# Patient Record
Sex: FEMALE | Race: BLACK OR AFRICAN AMERICAN | Employment: UNEMPLOYED | URBAN - METROPOLITAN AREA
[De-identification: names, ages, dates, MRNs, and addresses within clinical notes are randomized per-mention and may not be internally consistent; named-entity substitution may affect disease eponyms.]

---

## 2000-01-01 LAB — COLOGUARD TEST, EXTERNAL: NORMAL

## 2017-01-13 ENCOUNTER — TELEPHONE (OUTPATIENT)
Dept: NEUROLOGY | Age: 62
End: 2017-01-13

## 2017-01-13 NOTE — TELEPHONE ENCOUNTER
Spoke with Jackeline Ovalle, with [employer], patient will need [employer] Prior Auth Competed Faxed to office, and sent to [employer] for Avonex.

## 2017-01-24 ENCOUNTER — TELEPHONE (OUTPATIENT)
Dept: NEUROLOGY | Age: 62
End: 2017-01-24

## 2017-01-26 NOTE — TELEPHONE ENCOUNTER
Spoke with Eliana Salas, Pharmacist [facility], order for Avonex confirmed prefilled syringes.

## 2017-01-31 ENCOUNTER — TELEPHONE (OUTPATIENT)
Dept: NEUROLOGY | Age: 62
End: 2017-01-31

## 2017-02-01 NOTE — TELEPHONE ENCOUNTER
Spoke with Catalina Bañuelos, with [employer], received Denial for Avonex. Appeals for patient [phone]. PA # [phone]. [facility] [phone]. MD to resubmit for PA.

## 2017-02-02 ENCOUNTER — TELEPHONE (OUTPATIENT)
Dept: NEUROLOGY | Age: 62
End: 2017-02-02

## 2017-02-08 ENCOUNTER — TELEPHONE (OUTPATIENT)
Dept: NEUROLOGY | Age: 62
End: 2017-02-08

## 2017-02-08 NOTE — TELEPHONE ENCOUNTER
Home Health calling because they received a prescription for PT and they want a med list, history, and last office note Fax: [phone]

## 2017-02-10 RX ORDER — TIZANIDINE 4 MG/1
2 TABLET ORAL
Qty: 60 TAB | Refills: 0 | Status: SHIPPED | OUTPATIENT
Start: 2017-02-10 | End: 2017-05-05 | Stop reason: SDUPTHER

## 2017-02-10 NOTE — TELEPHONE ENCOUNTER
Requested Prescriptions     Pending Prescriptions Disp Refills    tiZANidine (ZANAFLEX) 4 mg tablet 60 Tab 0     Sig: Take 1 Tab by mouth every eight (8) hours as needed.

## 2017-02-13 ENCOUNTER — TELEPHONE (OUTPATIENT)
Dept: NEUROLOGY | Age: 62
End: 2017-02-13

## 2017-02-14 ENCOUNTER — TELEPHONE (OUTPATIENT)
Dept: NEUROLOGY | Age: 62
End: 2017-02-14

## 2017-02-14 NOTE — TELEPHONE ENCOUNTER
Would like a verbal order to see pt 2x per week for 6 weeks pending insurance approval.   Also wanted to note that pt is taking an extra benadryl that is not on medication list given by Dr. Abbi Courtney. Would also like pt to be fitted for a power wheelchair if Dr. Abbi Courtney approves.

## 2017-02-16 NOTE — TELEPHONE ENCOUNTER
Nikky Mott from [address] [place] [address] calling back because she hasn't received a phone call from the message she left on Tuesday. Please give her a call back as soon as possible.

## 2017-02-17 ENCOUNTER — TELEPHONE (OUTPATIENT)
Dept: NEUROLOGY | Age: 62
End: 2017-02-17

## 2017-02-17 DIAGNOSIS — G35 MS (MULTIPLE SCLEROSIS) (HCC): Primary | ICD-10-CM

## 2017-02-17 NOTE — TELEPHONE ENCOUNTER
Arelis Gardner with [employer] calling because she needs an order sent for eval and fitting for power wheelchair.  Please fax: [phone]

## 2017-02-21 ENCOUNTER — TELEPHONE (OUTPATIENT)
Dept: NEUROLOGY | Age: 62
End: 2017-02-21

## 2017-02-21 NOTE — TELEPHONE ENCOUNTER
Pharmacy calling in re to [facility]. The pharmacy would like to know if the pt is going to start taking the medication or if Dr. Adriano Gagnon decided to choose a different medication.  Please give them a call back

## 2017-02-24 ENCOUNTER — APPOINTMENT (OUTPATIENT)
Dept: GENERAL RADIOLOGY | Age: 62
DRG: 060 | End: 2017-02-24
Attending: EMERGENCY MEDICINE
Payer: MEDICARE

## 2017-02-24 ENCOUNTER — HOSPITAL ENCOUNTER (INPATIENT)
Age: 62
LOS: 7 days | Discharge: NURSING, REHAB OR LONG-TERM CARE | DRG: 060 | End: 2017-03-03
Attending: EMERGENCY MEDICINE | Admitting: INTERNAL MEDICINE
Payer: MEDICARE

## 2017-02-24 ENCOUNTER — APPOINTMENT (OUTPATIENT)
Dept: CT IMAGING | Age: 62
DRG: 060 | End: 2017-02-24
Attending: EMERGENCY MEDICINE
Payer: MEDICARE

## 2017-02-24 ENCOUNTER — TELEPHONE (OUTPATIENT)
Dept: NEUROLOGY | Age: 62
End: 2017-02-24
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
## 2017-02-24 DIAGNOSIS — G35 MULTIPLE SCLEROSIS EXACERBATION (HCC): Primary | ICD-10-CM

## 2017-02-24 LAB
ALBUMIN SERPL BCP-MCNC: 3.9 G/DL (ref 3.5–5)
ALBUMIN/GLOB SERPL: 0.9 {RATIO} (ref 1.1–2.2)
ALP SERPL-CCNC: 62 U/L (ref 45–117)
ALT SERPL-CCNC: 22 U/L (ref 12–78)
ANION GAP BLD CALC-SCNC: 9 MMOL/L (ref 5–15)
APPEARANCE UR: CLEAR
APTT PPP: 33.2 SEC (ref 22.1–32.5)
AST SERPL W P-5'-P-CCNC: 17 U/L (ref 15–37)
BACTERIA URNS QL MICRO: NEGATIVE /HPF
BASOPHILS # BLD AUTO: 0 K/UL (ref 0–0.1)
BASOPHILS # BLD: 0 % (ref 0–1)
BILIRUB SERPL-MCNC: 0.3 MG/DL (ref 0.2–1)
BILIRUB UR QL: NEGATIVE
BNP SERPL-MCNC: 81 PG/ML (ref 0–100)
BUN SERPL-MCNC: 9 MG/DL (ref 6–20)
BUN/CREAT SERPL: 17 (ref 12–20)
CALCIUM SERPL-MCNC: 8.9 MG/DL (ref 8.5–10.1)
CHLORIDE SERPL-SCNC: 99 MMOL/L (ref 97–108)
CK MB CFR SERPL CALC: 0.6 % (ref 0–2.5)
CK MB SERPL-MCNC: 1.2 NG/ML (ref 5–25)
CK SERPL-CCNC: 199 U/L (ref 26–192)
CO2 SERPL-SCNC: 29 MMOL/L (ref 21–32)
COLOR UR: ABNORMAL
CREAT SERPL-MCNC: 0.54 MG/DL (ref 0.55–1.02)
DIFFERENTIAL METHOD BLD: ABNORMAL
EOSINOPHIL # BLD: 0 K/UL (ref 0–0.4)
EOSINOPHIL NFR BLD: 0 % (ref 0–7)
EPITH CASTS URNS QL MICRO: ABNORMAL /LPF
ERYTHROCYTE [DISTWIDTH] IN BLOOD BY AUTOMATED COUNT: 13.7 % (ref 11.5–14.5)
FLUAV AG NPH QL IA: NEGATIVE
FLUBV AG NOSE QL IA: NEGATIVE
GLOBULIN SER CALC-MCNC: 4.4 G/DL (ref 2–4)
GLUCOSE SERPL-MCNC: 92 MG/DL (ref 65–100)
GLUCOSE UR STRIP.AUTO-MCNC: NEGATIVE MG/DL
HCT VFR BLD AUTO: 39.4 % (ref 35–47)
HGB BLD-MCNC: 12.8 G/DL (ref 11.5–16)
HGB UR QL STRIP: ABNORMAL
HYALINE CASTS URNS QL MICRO: ABNORMAL /LPF (ref 0–5)
INR PPP: 1.1 (ref 0.9–1.1)
KETONES UR QL STRIP.AUTO: NEGATIVE MG/DL
LACTATE SERPL-SCNC: 1.3 MMOL/L (ref 0.4–2)
LEUKOCYTE ESTERASE UR QL STRIP.AUTO: NEGATIVE
LYMPHOCYTES # BLD AUTO: 7 % (ref 12–49)
LYMPHOCYTES # BLD: 0.4 K/UL (ref 0.8–3.5)
MCH RBC QN AUTO: 27.1 PG (ref 26–34)
MCHC RBC AUTO-ENTMCNC: 32.5 G/DL (ref 30–36.5)
MCV RBC AUTO: 83.3 FL (ref 80–99)
MONOCYTES # BLD: 0.4 K/UL (ref 0–1)
MONOCYTES NFR BLD AUTO: 7 % (ref 5–13)
NEUTS SEG # BLD: 4.4 K/UL (ref 1.8–8)
NEUTS SEG NFR BLD AUTO: 86 % (ref 32–75)
NITRITE UR QL STRIP.AUTO: NEGATIVE
PH UR STRIP: 6 [PH] (ref 5–8)
PLATELET # BLD AUTO: 271 K/UL (ref 150–400)
POTASSIUM SERPL-SCNC: 3.4 MMOL/L (ref 3.5–5.1)
PROT SERPL-MCNC: 8.3 G/DL (ref 6.4–8.2)
PROT UR STRIP-MCNC: NEGATIVE MG/DL
PROTHROMBIN TIME: 11 SEC (ref 9–11.1)
RBC # BLD AUTO: 4.73 M/UL (ref 3.8–5.2)
RBC #/AREA URNS HPF: ABNORMAL /HPF (ref 0–5)
RBC MORPH BLD: ABNORMAL
SODIUM SERPL-SCNC: 137 MMOL/L (ref 136–145)
SP GR UR REFRACTOMETRY: 1.02 (ref 1–1.03)
THERAPEUTIC RANGE,PTTT: ABNORMAL SECS (ref 58–77)
TROPONIN I SERPL-MCNC: <0.04 NG/ML
UA: UC IF INDICATED,UAUC: ABNORMAL
UROBILINOGEN UR QL STRIP.AUTO: 0.2 EU/DL (ref 0.2–1)
WBC # BLD AUTO: 5.2 K/UL (ref 3.6–11)
WBC URNS QL MICRO: ABNORMAL /HPF (ref 0–4)

## 2017-02-24 PROCEDURE — 74011250636 HC RX REV CODE- 250/636: Performed by: FAMILY MEDICINE

## 2017-02-24 PROCEDURE — 65270000032 HC RM SEMIPRIVATE

## 2017-02-24 PROCEDURE — 93005 ELECTROCARDIOGRAM TRACING: CPT

## 2017-02-24 PROCEDURE — 80053 COMPREHEN METABOLIC PANEL: CPT | Performed by: EMERGENCY MEDICINE

## 2017-02-24 PROCEDURE — 82550 ASSAY OF CK (CPK): CPT | Performed by: EMERGENCY MEDICINE

## 2017-02-24 PROCEDURE — 74011250637 HC RX REV CODE- 250/637: Performed by: FAMILY MEDICINE

## 2017-02-24 PROCEDURE — 99285 EMERGENCY DEPT VISIT HI MDM: CPT

## 2017-02-24 PROCEDURE — 83605 ASSAY OF LACTIC ACID: CPT | Performed by: EMERGENCY MEDICINE

## 2017-02-24 PROCEDURE — 83880 ASSAY OF NATRIURETIC PEPTIDE: CPT | Performed by: EMERGENCY MEDICINE

## 2017-02-24 PROCEDURE — 96360 HYDRATION IV INFUSION INIT: CPT

## 2017-02-24 PROCEDURE — 74011250636 HC RX REV CODE- 250/636: Performed by: INTERNAL MEDICINE

## 2017-02-24 PROCEDURE — 87040 BLOOD CULTURE FOR BACTERIA: CPT | Performed by: EMERGENCY MEDICINE

## 2017-02-24 PROCEDURE — 81001 URINALYSIS AUTO W/SCOPE: CPT | Performed by: EMERGENCY MEDICINE

## 2017-02-24 PROCEDURE — 36415 COLL VENOUS BLD VENIPUNCTURE: CPT | Performed by: EMERGENCY MEDICINE

## 2017-02-24 PROCEDURE — 70450 CT HEAD/BRAIN W/O DYE: CPT

## 2017-02-24 PROCEDURE — 71010 XR CHEST PORT: CPT

## 2017-02-24 PROCEDURE — 82553 CREATINE MB FRACTION: CPT | Performed by: EMERGENCY MEDICINE

## 2017-02-24 PROCEDURE — 84484 ASSAY OF TROPONIN QUANT: CPT | Performed by: EMERGENCY MEDICINE

## 2017-02-24 PROCEDURE — 74011250636 HC RX REV CODE- 250/636: Performed by: EMERGENCY MEDICINE

## 2017-02-24 PROCEDURE — 85610 PROTHROMBIN TIME: CPT | Performed by: EMERGENCY MEDICINE

## 2017-02-24 PROCEDURE — 74011250637 HC RX REV CODE- 250/637: Performed by: INTERNAL MEDICINE

## 2017-02-24 PROCEDURE — 87804 INFLUENZA ASSAY W/OPTIC: CPT | Performed by: EMERGENCY MEDICINE

## 2017-02-24 PROCEDURE — 85730 THROMBOPLASTIN TIME PARTIAL: CPT | Performed by: EMERGENCY MEDICINE

## 2017-02-24 PROCEDURE — 85025 COMPLETE CBC W/AUTO DIFF WBC: CPT | Performed by: EMERGENCY MEDICINE

## 2017-02-24 RX ORDER — TIZANIDINE 4 MG/1
4 TABLET ORAL
Status: DISCONTINUED | OUTPATIENT
Start: 2017-02-24 | End: 2017-03-03 | Stop reason: HOSPADM

## 2017-02-24 RX ORDER — DIPHENHYDRAMINE HYDROCHLORIDE 50 MG/ML
12.5 INJECTION, SOLUTION INTRAMUSCULAR; INTRAVENOUS
Status: DISCONTINUED | OUTPATIENT
Start: 2017-02-24 | End: 2017-03-03 | Stop reason: HOSPADM

## 2017-02-24 RX ORDER — ACETAMINOPHEN 325 MG/1
650 TABLET ORAL
Status: DISCONTINUED | OUTPATIENT
Start: 2017-02-24 | End: 2017-03-03 | Stop reason: HOSPADM

## 2017-02-24 RX ORDER — DIPHENHYDRAMINE HYDROCHLORIDE 50 MG/ML
12.5 INJECTION, SOLUTION INTRAMUSCULAR; INTRAVENOUS ONCE
Status: COMPLETED | OUTPATIENT
Start: 2017-02-24 | End: 2017-02-24

## 2017-02-24 RX ORDER — HEPARIN SODIUM 5000 [USP'U]/ML
5000 INJECTION, SOLUTION INTRAVENOUS; SUBCUTANEOUS EVERY 8 HOURS
Status: DISCONTINUED | OUTPATIENT
Start: 2017-02-24 | End: 2017-03-03 | Stop reason: HOSPADM

## 2017-02-24 RX ORDER — SERTRALINE HYDROCHLORIDE 50 MG/1
50 TABLET, FILM COATED ORAL DAILY
Status: DISCONTINUED | OUTPATIENT
Start: 2017-02-25 | End: 2017-02-24

## 2017-02-24 RX ORDER — SODIUM CHLORIDE 0.9 % (FLUSH) 0.9 %
5-10 SYRINGE (ML) INJECTION AS NEEDED
Status: DISCONTINUED | OUTPATIENT
Start: 2017-02-24 | End: 2017-03-03 | Stop reason: HOSPADM

## 2017-02-24 RX ORDER — SODIUM CHLORIDE 0.9 % (FLUSH) 0.9 %
5-10 SYRINGE (ML) INJECTION EVERY 8 HOURS
Status: DISCONTINUED | OUTPATIENT
Start: 2017-02-24 | End: 2017-03-03 | Stop reason: HOSPADM

## 2017-02-24 RX ORDER — SERTRALINE HYDROCHLORIDE 50 MG/1
50 TABLET, FILM COATED ORAL DAILY
COMMUNITY
End: 2017-05-05 | Stop reason: SDUPTHER

## 2017-02-24 RX ORDER — SERTRALINE HYDROCHLORIDE 50 MG/1
50 TABLET, FILM COATED ORAL DAILY
Status: DISCONTINUED | OUTPATIENT
Start: 2017-02-24 | End: 2017-03-03 | Stop reason: HOSPADM

## 2017-02-24 RX ADMIN — TIZANIDINE 4 MG: 4 TABLET ORAL at 21:21

## 2017-02-24 RX ADMIN — HEPARIN SODIUM 5000 UNITS: 5000 INJECTION, SOLUTION INTRAVENOUS; SUBCUTANEOUS at 21:22

## 2017-02-24 RX ADMIN — ACETAMINOPHEN 650 MG: 325 TABLET, FILM COATED ORAL at 17:47

## 2017-02-24 RX ADMIN — DIPHENHYDRAMINE HYDROCHLORIDE 12.5 MG: 50 INJECTION, SOLUTION INTRAMUSCULAR; INTRAVENOUS at 21:22

## 2017-02-24 RX ADMIN — SODIUM CHLORIDE 1000 ML: 900 INJECTION, SOLUTION INTRAVENOUS at 17:44

## 2017-02-24 RX ADMIN — SODIUM CHLORIDE 1000 ML: 900 INJECTION, SOLUTION INTRAVENOUS at 14:25

## 2017-02-24 RX ADMIN — Medication 10 ML: at 21:23

## 2017-02-24 RX ADMIN — SERTRALINE HYDROCHLORIDE 50 MG: 50 TABLET ORAL at 21:21

## 2017-02-24 NOTE — H&P
Hospitalist Progress Note          Jasmyn Salter M.D. [place]: [phone]  Call physician on-call through the  7pm-7am        Date of visit:  2/24/2017    Primary Care Provider: Marisol Price MD  Source of Information: Patient and chart review    History of Presenting Illness:   Sheryl Wagner is a 59 y.o. female with PMHx of MS and MVP, who presents to the ER with chief complaint of weakness that resulted in a fall earlier today. Patient states that her Right leg is \"paralyzed\" at baseline and she normally ambulates with a cane. Since yesterday, she had a fairly rapid onset of profound weakness in her Left leg as well, which made her ambulation difficult. Last night, she got up to use the bathroom, she felt dizzy and weak in both legs. She fell to the floor and was not able to get up. She lay on the floor most of the night. Her family got concerned when she did not answer the phone, and called the police. Patient denies any LOC, she denies hitting her head or injuring herself. She complains of left lateral arm pain that has been present for about two weeks. She states that she felt nauseous this morning and she had multiple episodes of urination last night. \"I just kept on going and going\". She denies any burning. No reported fevers, chills, chest pain, cough, congestion, recent illness, palpitations, or dysuria. Review of Systems:  Pertinent items are noted in the History of Present Illness. Past Medical History:   Diagnosis Date    Neurological disorder     Multiple sclerosis, in remission 2011    Other ill-defined conditions(799.89)     Mitral Value Prolapse      Past Surgical History:   Procedure Laterality Date    ABDOMEN SURGERY PROC UNLISTED      hernia repair    HX HYSTERECTOMY       Prior to Admission medications    Medication Sig Start Date End Date Taking?  Authorizing Provider   sertraline (ZOLOFT) 50 mg tablet Take 50 mg by mouth daily. Yes Historical Provider   tiZANidine (ZANAFLEX) 4 mg tablet Take 1 Tab by mouth every eight (8) hours as needed. 2/10/17  Yes Anastacia Serna DO     No Known Allergies   History reviewed. No pertinent family history. SOCIAL HISTORY:  Patient resides:  Independently x   Assisted Living    SNF    With family care       Smoking history:   None x   Former    Chronic      Alcohol history:   None x   Social    Chronic      Ambulates:   Independently    w/cane x   w/walker    w/wc    CODE STATUS:  DNR    Full x   Other      Objective:     Physical Exam:     Visit Vitals    /66    Pulse 88    Temp (!) 101.3 °F (38.5 °C)    Resp 19    Ht 5' 3\" (1.6 m)    Wt 72.6 kg (160 lb)    SpO2 93%    BMI 28.34 kg/m2      O2 Device: Room air    General:  Alert, cooperative, no distress, appears stated age. Head:  Normocephalic, without obvious abnormality, atraumatic. Eyes:  Conjunctivae/corneas clear. EOMs intact. Patient wears glasses   Neck: Supple, symmetrical, trachea midline;       Lungs:   Clear to auscultation bilaterally. Heart:  Regular rate and rhythm, S1, S2 normal, no murmur, click, rub or gallop. Abdomen:   Soft, non-tender. Bowel sounds normal.    Extremities: Extremities normal, atraumatic, no cyanosis or edema. Skin: Skin color, texture, turgor normal. No rashes or lesions   Neurologic: Speech intact, LLE strength 1/5; RLE strength 3/5;   Both upper extremities, strength is 4/5, other than distal left hand with contractures of small muscles of the hand.       Data Review:     Recent Days:  Recent Labs      02/24/17   1415   WBC  5.2   HGB  12.8   HCT  39.4   PLT  271     Recent Labs      02/24/17   1415  02/24/17   1414   NA  137   --    K  3.4*   --    CL  99   --    CO2  29   --    GLU  92   --    BUN  9   --    CREA  0.54*   --    CA  8.9   --    ALB  3.9   --    SGOT  17   --    ALT  22   --    INR   --   1.1     No results for input(s): PH, PCO2, PO2, HCO3, FIO2 in the last 72 hours. 24 Hour Results:  Recent Results (from the past 24 hour(s))   EKG, 12 LEAD, INITIAL    Collection Time: 02/24/17  2:11 PM   Result Value Ref Range    Ventricular Rate 82 BPM    Atrial Rate 82 BPM    P-R Interval 140 ms    QRS Duration 62 ms    Q-T Interval 374 ms    QTC Calculation (Bezet) 436 ms    Calculated P Axis 42 degrees    Calculated R Axis 50 degrees    Calculated T Axis 43 degrees    Diagnosis       Normal sinus rhythm  When compared with ECG of 25-JUL-2011 10:26,  No significant change was found     LACTIC ACID, PLASMA    Collection Time: 02/24/17  2:14 PM   Result Value Ref Range    Lactic acid 1.3 0.4 - 2.0 MMOL/L   PTT    Collection Time: 02/24/17  2:14 PM   Result Value Ref Range    aPTT 33.2 (H) 22.1 - 32.5 sec    aPTT, therapeutic range     58.0 - 77.0 SECS   PROTHROMBIN TIME + INR    Collection Time: 02/24/17  2:14 PM   Result Value Ref Range    INR 1.1 0.9 - 1.1      Prothrombin time 11.0 9.0 - 11.1 sec   BNP    Collection Time: 02/24/17  2:14 PM   Result Value Ref Range    BNP 81 0 - 100 pg/mL   CBC WITH AUTOMATED DIFF    Collection Time: 02/24/17  2:15 PM   Result Value Ref Range    WBC 5.2 3.6 - 11.0 K/uL    RBC 4.73 3.80 - 5.20 M/uL    HGB 12.8 11.5 - 16.0 g/dL    HCT 39.4 35.0 - 47.0 %    MCV 83.3 80.0 - 99.0 FL    MCH 27.1 26.0 - 34.0 PG    MCHC 32.5 30.0 - 36.5 g/dL    RDW 13.7 11.5 - 14.5 %    PLATELET 484 767 - 901 K/uL    NEUTROPHILS 86 (H) 32 - 75 %    LYMPHOCYTES 7 (L) 12 - 49 %    MONOCYTES 7 5 - 13 %    EOSINOPHILS 0 0 - 7 %    BASOPHILS 0 0 - 1 %    ABS. NEUTROPHILS 4.4 1.8 - 8.0 K/UL    ABS. LYMPHOCYTES 0.4 (L) 0.8 - 3.5 K/UL    ABS. MONOCYTES 0.4 0.0 - 1.0 K/UL    ABS. EOSINOPHILS 0.0 0.0 - 0.4 K/UL    ABS.  BASOPHILS 0.0 0.0 - 0.1 K/UL    DF SMEAR SCANNED      RBC COMMENTS ANISOCYTOSIS  1+       METABOLIC PANEL, COMPREHENSIVE    Collection Time: 02/24/17  2:15 PM   Result Value Ref Range    Sodium 137 136 - 145 mmol/L    Potassium 3.4 (L) 3.5 - 5.1 mmol/L    Chloride 99 97 - 108 mmol/L    CO2 29 21 - 32 mmol/L    Anion gap 9 5 - 15 mmol/L    Glucose 92 65 - 100 mg/dL    BUN 9 6 - 20 MG/DL    Creatinine 0.54 (L) 0.55 - 1.02 MG/DL    BUN/Creatinine ratio 17 12 - 20      GFR est AA >60 >60 ml/min/1.73m2    GFR est non-AA >60 >60 ml/min/1.73m2    Calcium 8.9 8.5 - 10.1 MG/DL    Bilirubin, total 0.3 0.2 - 1.0 MG/DL    ALT (SGPT) 22 12 - 78 U/L    AST (SGOT) 17 15 - 37 U/L    Alk. phosphatase 62 45 - 117 U/L    Protein, total 8.3 (H) 6.4 - 8.2 g/dL    Albumin 3.9 3.5 - 5.0 g/dL    Globulin 4.4 (H) 2.0 - 4.0 g/dL    A-G Ratio 0.9 (L) 1.1 - 2.2     TROPONIN I    Collection Time: 02/24/17  2:15 PM   Result Value Ref Range    Troponin-I, Qt. <0.04 <0.05 ng/mL   CK W/ REFLX CKMB    Collection Time: 02/24/17  2:15 PM   Result Value Ref Range     (H) 26 - 192 U/L   CK-MB,QUANT. Collection Time: 02/24/17  2:15 PM   Result Value Ref Range    CK - MB 1.2 <3.6 NG/ML    CK-MB Index 0.6 0 - 2.5           Imaging:   CT Head:   White matter disease. No hemorrhage or acute intracranial  Abnormality. CXR:   Normal chest.      Assessment and Plan:     Multiple sclerosis with possible exacerbation, resulting in a fall:  - admit to medical floor;  - Neurology consult;  - PT/OT evaluation;  - assess for infection as cause of worsening weakness: UA pending; blood culture collected and pending;  - empiric Zosyn, then streamline based on culture results. Mitral Valve Prolapse.      DVT prophylaxis: Heparin SC             Signed By: Amrik Orozco MD     February 24, 2017

## 2017-02-24 NOTE — IP AVS SNAPSHOT
[address] 
[phone] Patient: Alexandra Holder MRN: [id number] FRC:6/11/2071 You are allergic to the following No active allergies Recent Documentation Height Weight Breastfeeding? BMI OB Status Smoking Status 1.6 m 72.6 kg No 28.34 kg/m2 Hysterectomy Former Smoker Emergency Contacts Name Discharge Info Relation Home Work Mobile Susi Holly  Other Relative [6] [phone] Refuse,Refuse N/A  AT THIS TIME [6] Anny Mejia  Other Relative [6] [phone] About your hospitalization You were admitted on:  February 24, 2017 You last received care in the:  [address] MED SURG You were discharged on:  March 3, 2017 Unit phone number:  [phone] Why you were hospitalized Your primary diagnosis was:  Multiple Sclerosis Exacerbation (Hcc) Providers Seen During Your Hospitalizations Provider Role Specialty Primary office phone Payton Kelley MD Attending Provider Emergency Medicine [phone] Quinton Rollins MD Attending Provider Internal Medicine [phone] Jasmyn Carpenter MD Attending Provider Internal Medicine [phone] Sameer Holly MD Attending Provider Internal Medicine [phone] Your Primary Care Physician (PCP) Primary Care Physician Office Phone Office Fax Lilli Casarez [phone] Follow-up Information Follow up With Details Comments Contact Info Maribell Jaeger MD In 1 week  [address] [address] 
[phone] [address] [place] [address], DO  As needed 200 [facility] [address] [facility] [address] 
[phone] [address] [facility] [address] 
[phone] Your Appointments Friday March 24, 2017  2:40 PM EDT Follow Up with [address] [place] [address], DO  
 [facility] at [address] [facility] [address]  
[phone] Current Discharge Medication List  
  
START taking these medications Dose & Instructions Dispensing Information Comments Morning Noon Evening Bedtime  
 bisacodyl 10 mg suppository Commonly known as:  DULCOLAX Your next dose is: Today, Tomorrow Other:  _________ Dose:  10 mg Insert 10 mg into rectum daily as needed. Quantity:  1 Each Refills:  0  
     
   
   
   
  
 docusate sodium 100 mg capsule Commonly known as:  Polk Lass Your next dose is: Today, Tomorrow Other:  _________ Dose:  100 mg Take 1 Cap by mouth daily for 90 days. Quantity:  90 Cap Refills:  0  
     
   
   
   
  
 oseltamivir 75 mg capsule Commonly known as:  TAMIFLU Start taking on:  3/4/2017 Your next dose is: Today, Tomorrow Other:  _________ Dose:  75 mg Take 1 Cap by mouth every twenty-four (24) hours for 2 days. Quantity:  2 Cap Refills:  0  
     
   
   
   
  
 pantoprazole 40 mg tablet Commonly known as:  PROTONIX Your next dose is: Today, Tomorrow Other:  _________ Dose:  40 mg Take 1 Tab by mouth daily. Quantity:  1 Tab Refills:  0 CONTINUE these medications which have NOT CHANGED Dose & Instructions Dispensing Information Comments Morning Noon Evening Bedtime  
 sertraline 50 mg tablet Commonly known as:  ZOLOFT Your next dose is: Today, Tomorrow Other:  _________ Dose:  50 mg Take 50 mg by mouth daily. Refills:  0  
     
   
   
   
  
 tiZANidine 4 mg tablet Commonly known as:  Elenore Piggs Your next dose is: Today, Tomorrow Other:  _________ Dose:  2 mg Take 1 Tab by mouth every eight (8) hours as needed. Quantity:  60 Tab Refills:  0 Where to Get Your Medications These medications were sent to [address] [place] [address], [address] [place] [address] Phone:  [phone]  
  bisacodyl 10 mg suppository  
 docusate sodium 100 mg capsule  
 pantoprazole 40 mg tablet Information on where to get these meds will be given to you by the nurse or doctor. ! Ask your nurse or doctor about these medications  
  oseltamivir 75 mg capsule Discharge Instructions Discharge Summary PATIENT ID: Karen Rascon MRN: [id number] YOB: 1955 DATE OF ADMISSION: 2/24/2017  1:31 PM   
DATE OF DISCHARGE: 2/28/17 PRIMARY CARE PROVIDER: Too Ambriz MD  
 
ATTENDING PHYSICIAN: Fabian Dean DISCHARGING PROVIDER: Jennifer Khan MD   
To contact this individual call [phone] and ask the  to page. If unavailable ask to be transferred the Adult Hospitalist Department. CONSULTATIONS: IP CONSULT TO NEUROLOGY PROCEDURES/SURGERIES: * No surgery found * [id number] [facility] COURSE: The patient is a 1year-old female  
with a past medical history of multiple sclerosis that was diagnosed in  
[address] [facility] after she developed optic neuritis. She had been following up  
with Dr. Delfina Edmonds in the past and saw Dr. Everette Wong in December of  
last year. She states that she has done quite well over the years and  
had been fairly functional, except for some right lower extremity  
weakness and spasticity. Her last MRI of the brain in December was  
stable as per records. She used to be on Avonex, which she took for  
many years and then stopped taking it in 2005 and there was a plan to  
restart it, but she has not been able to do that as yet.  Botulinum toxin  
injections were also considered for the right leg spasticity, but she  
 could not do it because of financial reasons. Over the past couple of  
days, she noticed that her left leg was starting to feel weak. The right  
leg was also feeling weaker than usual. She fell last night and was  
unable to get up. She was brought into the hospital for further workup  
and treatment. She denies any symptoms in the upper extremities. No  
changes in vision, speech or swallowing ability. She reports some pain  
in the left arm. She has also had urinary frequency. 1. Multiple sclerosis with possible exacerbation, resulting in a fall - appreciate neurology consult; continue IV Solumedrol; needs PT/OT evaluation finished 3 doses of steroids . 2. Fever - continue to assess for infection as cause of worsening weakness --> UA fairly unremarkable; blood culture (NGTD); stopped empiric zosyn 3. Mitral valve prolapse 
  
4 zoloft DISCHARGE DIAGNOSES / PLAN:   
 
Needs PT   
 
 
PENDING TEST RESULTS:  
At the time of discharge the following test results are still pending: FOLLOW UP APPOINTMENTS:   
Follow-up Information Follow up With Details Comments Contact [address] MD [place] In 1 week  [id number] [place] [address] [address] [place] [address] 
[phone] [address] [place] [address], DO  As needed [address] [facility] [address] 
[phone] ADDITIONAL CARE RECOMMENDATIONS:  
Please follow up PCP DIET: Regular Diet ACTIVITY: Activity as tolerated WOUND CARE:  
 
EQUIPMENT needed:  
 
 
DISCHARGE MEDICATIONS: 
Current Discharge Medication List  
  
CONTINUE these medications which have NOT CHANGED Details  
sertraline (ZOLOFT) 50 mg tablet Take 50 mg by mouth daily. tiZANidine (ZANAFLEX) 4 mg tablet Take 1 Tab by mouth every eight (8) hours as needed. Qty: 60 Tab, Refills: 0  
  
  
 
 
 
NOTIFY YOUR PHYSICIAN FOR ANY OF THE FOLLOWING:  
Fever over 101 degrees for 24 hours. Chest pain, shortness of breath, fever, chills, nausea, vomiting, diarrhea, change in mentation, falling, weakness, bleeding. Severe pain or pain not relieved by medications. Or, any other signs or symptoms that you may have questions about. DISPOSITION: 
  Home With: 
 OT  PT  HH  RN  
  
x Long term SNF/Inpatient Rehab Independent/assisted living Hospice Other:  
 
 
PATIENT CONDITION AT DISCHARGE:  
 
Functional status Poor   
x Deconditioned Independent Cognition  
 x Lucid Forgetful Dementia Catheters/lines (plus indication) Leon PICC   
 PEG   
x None Code status  
x  Full code DNR   
 
PHYSICAL EXAMINATION AT DISCHARGE: 
 Refer to Progress Note CHRONIC MEDICAL DIAGNOSES: 
Problem List as of 2/28/2017  Date Reviewed: 2/24/2017 Codes Class Noted - Resolved * (Principal)Multiple sclerosis exacerbation ([facility] [address] ICD-10-CM: G35 
ICD-9-CM: 340  2/24/2017 - Present Greater than 20  minutes were spent with the patient on counseling and coordination of care Signed:  
Irlanda Byrne MD 
2/28/2017 
8:26 AM 
 
 
 
 
Discharge Orders None [employer] Announcement We are excited to announce that we are making your provider's discharge notes available to you in [employer]. You will see these notes when they are completed and signed by the physician that discharged you from your recent hospital stay. If you have any questions or concerns about any information you see in [employer], please call the Health Information Department where you were seen or reach out to your Primary Care Provider for more information about your plan of care. Introducing [facility]! Romayne Duster introduces [employer] patient portal. Now you can access parts of your medical record, email your doctor's office, and request medication refills online. 1. In your internet browser, go to [email or web address] 2. Click on the First Time User? Click Here link in the Sign In box. You will see the New Member Sign Up page. 3. Enter your [employer] Access Code exactly as it appears below. You will not need to use this code after youve completed the sign-up process. If you do not sign up before the expiration date, you must request a new code. · [employer] Access Code: [email or web address] Expires: 3/23/2017  3:17 PM 
 
4. Enter the last four digits of your Social Security Number (xxxx) and Date of Birth (mm/dd/yyyy) as indicated and click Submit. You will be taken to the next sign-up page. 5. Create a Service Route ID. This will be your Service Route login ID and cannot be changed, so think of one that is secure and easy to remember. 6. Create a [employer] password. You can change your password at any time. 7. Enter your Password Reset Question and Answer. This can be used at a later time if you forget your password. 8. Enter your e-mail address. You will receive e-mail notification when new information is available in [address] 9. Click Sign Up. You can now view and download portions of your medical record. 10. Click the Download Summary menu link to download a portable copy of your medical information. If you have questions, please visit the Frequently Asked Questions section of the [employer] website. Remember, [employer] is NOT to be used for urgent needs. For medical emergencies, dial 911. Now available from your iPhone and Android! General Information Please provide this summary of care documentation to your next provider. Patient Signature:  ____________________________________________________________ Date:  ____________________________________________________________  
  
Damaso Alfonso Signature:  ____________________________________________________________ Date:  ____________________________________________________________

## 2017-02-24 NOTE — TELEPHONE ENCOUNTER
Going to miss a visit this week, not answering door or picking up her phone. Appt was scheduled for this afternoon.

## 2017-02-24 NOTE — ED TRIAGE NOTES
Pt arrives via EMS from home c/o fall in bathroom sometime late last night. Pt reports legs got weak and caused fall. Pt has MS. Pt denies injury from fall, denies hitting head, denies LOC, denies dizziness, denies vomiting, denies diarrhea, denies cp/sob. Pt reported nausea. Pt was unable to get up and stayed on the floor all night.      Pt received the following enroute:  4 mg Zofran  300 cc NS

## 2017-02-24 NOTE — IP AVS SNAPSHOT
Summary of Care Report The Summary of Care report has been created to help improve care coordination. Users with access to [employer] or [address] (Web-based application) may access additional patient information including the Discharge Summary. If you are not currently a [address] user and need more information, please call the number listed below in the [place] [address] section and ask to be connected with Medical Records. Facility Information Name Address Phone [address] [facility] [phone] Patient Information Patient Name Sex  Ave Rides ([id number]) Female 1955 Discharge Information Admitting Provider Service Area Unit Yariel Jacobs MD / 1 [address] Med Surg / [phone] Discharge Provider Discharge Date/Time Discharge Disposition Destination (none) 3/3/2017 (Pending) AHR (none) Patient Language Language ENGLISH [13] Problem List as of 3/3/2017  Date Reviewed: 2017 Codes Priority Class Noted - Resolved * (Principal)Multiple sclerosis exacerbation ([facility] ICD-10-CM: G35 
ICD-9-CM: 340   2017 - Present You are allergic to the following No active allergies Current Discharge Medication List  
  
START taking these medications Dose & Instructions Dispensing Information Comments  
 bisacodyl 10 mg suppository Commonly known as:  DULCOLAX Dose:  10 mg Insert 10 mg into rectum daily as needed. Quantity:  1 Each Refills:  0  
   
 docusate sodium 100 mg capsule Commonly known as:  Uli Call Dose:  100 mg Take 1 Cap by mouth daily for 90 days. Quantity:  90 Cap Refills:  0  
   
 oseltamivir 75 mg capsule Commonly known as:  TAMIFLU Start taking on:  3/4/2017  Dose:  75 mg  
 Take 1 Cap by mouth every twenty-four (24) hours for 2 days. Quantity:  2 Cap Refills:  0  
   
 pantoprazole 40 mg tablet Commonly known as:  PROTONIX Dose:  40 mg Take 1 Tab by mouth daily. Quantity:  1 Tab Refills:  0 CONTINUE these medications which have NOT CHANGED Dose & Instructions Dispensing Information Comments  
 sertraline 50 mg tablet Commonly known as:  ZOLOFT Dose:  50 mg Take 50 mg by mouth daily. Refills:  0  
   
 tiZANidine 4 mg tablet Commonly known as:  Nidhi Seeds Dose:  2 mg Take 1 Tab by mouth every eight (8) hours as needed. Quantity:  60 Tab Refills:  0 Current Immunizations Name Date Td, Adsorbed PF 7/19/2016 Follow-up Information Follow up With Details Comments Contact Info [address] [place] [address] MD Otis In 1 week  [address] 
[phone] [address] [place] [address], DO  As needed [address] [place] [address] At [address] Neurology [place] [address] [address] [place] 
[phone] [address] [facility] [address] 
[phone] Discharge Instructions Discharge Summary PATIENT ID: Skip Kong MRN: [id number] YOB: 1955 DATE OF ADMISSION: 2/24/2017  1:31 PM   
DATE OF DISCHARGE: 2/28/17 PRIMARY CARE PROVIDER: [address] [place] [address] MD Otis  
 
ATTENDING PHYSICIAN: Alix Fish DISCHARGING PROVIDER: Zahra Lemon MD   
To contact this individual call [phone] and ask the  to page. If unavailable ask to be transferred the Adult Hospitalist Department. CONSULTATIONS: IP CONSULT TO NEUROLOGY PROCEDURES/SURGERIES: * No surgery found * [id number] [facility] COURSE: The patient is a 1year-old female  
with a past medical history of multiple sclerosis that was diagnosed in  
[facility] after she developed optic neuritis. She had been following up with Dr. Epifanio Morrissey in the past and saw Dr. Jerel Guadalupe in December of  
last year. She states that she has done quite well over the years and  
had been fairly functional, except for some right lower extremity  
weakness and spasticity. Her last MRI of the brain in December was  
stable as per records. She used to be on Avonex, which she took for  
many years and then stopped taking it in 2005 and there was a plan to  
restart it, but she has not been able to do that as yet. Botulinum toxin  
injections were also considered for the right leg spasticity, but she  
could not do it because of financial reasons. Over the past couple of  
days, she noticed that her left leg was starting to feel weak. The right  
leg was also feeling weaker than usual. She fell last night and was  
unable to get up. She was brought into the hospital for further workup  
and treatment. She denies any symptoms in the upper extremities. No  
changes in vision, speech or swallowing ability. She reports some pain  
in the left arm. She has also had urinary frequency. 1. Multiple sclerosis with possible exacerbation, resulting in a fall - appreciate neurology consult; continue IV Solumedrol; needs PT/OT evaluation finished 3 doses of steroids . 2. Fever - continue to assess for infection as cause of worsening weakness --> UA fairly unremarkable; blood culture (NGTD); stopped empiric zosyn 3. Mitral valve prolapse 
  
4 zoloft DISCHARGE DIAGNOSES / PLAN:   
 
Needs PT   
 
 
PENDING TEST RESULTS:  
At the time of discharge the following test results are still pending: FOLLOW UP APPOINTMENTS:   
Follow-up Information Follow up With Details Comments Contact [address] MD Otis In 1 week  [address] [place] [address] 
[phone] [address] [place] [address], DO  As needed [address] [facility] [address] [facility] Neurology [facility] [address] 
[phone] ADDITIONAL CARE RECOMMENDATIONS:  
 Please follow up PCP DIET: Regular Diet ACTIVITY: Activity as tolerated WOUND CARE:  
 
EQUIPMENT needed:  
 
 
DISCHARGE MEDICATIONS: 
Current Discharge Medication List  
  
CONTINUE these medications which have NOT CHANGED Details  
sertraline (ZOLOFT) 50 mg tablet Take 50 mg by mouth daily. tiZANidine (ZANAFLEX) 4 mg tablet Take 1 Tab by mouth every eight (8) hours as needed. Qty: 60 Tab, Refills: 0  
  
  
 
 
 
NOTIFY YOUR PHYSICIAN FOR ANY OF THE FOLLOWING:  
Fever over 101 degrees for 24 hours. Chest pain, shortness of breath, fever, chills, nausea, vomiting, diarrhea, change in mentation, falling, weakness, bleeding. Severe pain or pain not relieved by medications. Or, any other signs or symptoms that you may have questions about. DISPOSITION: 
  Home With: 
 OT  PT  HH  RN  
  
x Long term SNF/Inpatient Rehab Independent/assisted living Hospice Other:  
 
 
PATIENT CONDITION AT DISCHARGE:  
 
Functional status Poor   
x Deconditioned Independent Cognition  
 x Lucid Forgetful Dementia Catheters/lines (plus indication) Leon PICC   
 PEG   
x None Code status  
x  Full code DNR   
 
PHYSICAL EXAMINATION AT DISCHARGE: 
 Refer to Progress Note CHRONIC MEDICAL DIAGNOSES: 
Problem List as of 2/28/2017  Date Reviewed: 2/24/2017 Codes Class Noted - Resolved * (Principal)[facility]ation ([facility] ICD-10-CM: G35 
ICD-9-CM: 340  2/24/2017 - Present Greater than 20  minutes were spent with the patient on counseling and coordination of care Signed:  
Minda Moura MD 
2/28/2017 
8:26 AM 
 
 
 
 
Chart Review Routing History Recipient Method Report Sent By Shalonda Brothers MD  
Phone: [phone] In Basket IP Auto Routed Notes Linnea Diaz MD [[id number]] 2/24/2017  5:50 PM 02/24/2017 Jason Brothers MD  
Phone: [phone] In Basket IP Auto Routed Notes Meghana Antony MD [[id number]] 2/28/2017  8:30 AM 02/28/2017 Ashely Flores MD  
Phone: [phone] In Basket IP Auto Routed Notes Meghana Antony MD [[id number]] 2/28/2017  8:30 AM 02/28/2017 Ashely Flores MD  
Phone: [phone] In Basket IP Auto Routed Notes Meghana Antony MD [[id number]] 3/3/2017 10:22 AM 03/03/2017

## 2017-02-24 NOTE — ED PROVIDER NOTES
HPI Comments: 59 y.o. female with past medical history significant for MS, mitral valve prolapse who presents via EMS from home with chief complaint of weakness. Pt reports that she got up to walk to the bathroom last night, and while there felt increased bilateral leg weakness, dizziness and fell to the floor where she lay all night. She reports her family attempted to call her multiple times with no success and had to call the apartment's superintendent to get to the pt this morning. Pt denies any injury upon falling, did not hit head or have LOC. Pt states that she was nauseous this morning and en route to the ED, she was given 4 mg zofran with relief and 300cc NS. She notes that she has MS and has chronic bilateral leg weakness, but says that her left leg has become more numb in the last two days. She also notes left arm pain that has been present for the past two weeks. Pt denies syncope and abdominal pain. There are no other acute medical concerns at this time. Social hx: pt is mostly nonambulatory and uses a wheelchair, but occasionally walks with cane and walker. Pt is visited by a physical therapist recently. Pt lives in a private apartment. PCP: Nick Carballo MD    Note written by Humberto Dixon, as dictated by Cheri Reina MD 1:56 PM      The history is provided by the patient. Past Medical History:   Diagnosis Date    Neurological disorder     Multiple sclerosis, in remission 2011    Other ill-defined conditions(799.89)     Mitral Value Prolapse       Past Surgical History:   Procedure Laterality Date    ABDOMEN SURGERY PROC UNLISTED      hernia repair    HX HYSTERECTOMY           No family history on file. Social History     Social History    Marital status: SINGLE     Spouse name: N/A    Number of children: N/A    Years of education: N/A     Occupational History    Not on file.      Social History Main Topics    Smoking status: Current Some Day Smoker    Smokeless tobacco: Not on file    Alcohol use Yes      Comment: 2x weekly    Drug use: Yes     Special: Marijuana      Comment: occasional marijuana    Sexual activity: Not Currently     Other Topics Concern    Not on file     Social History Narrative         ALLERGIES: Review of patient's allergies indicates no known allergies. Review of Systems   Gastrointestinal: Positive for nausea. Negative for abdominal pain. Musculoskeletal: Positive for myalgias (left arm). Neurological: Positive for dizziness, weakness and numbness. Negative for syncope. All other systems reviewed and are negative. Vitals:    02/24/17 1415 02/24/17 1428 02/24/17 1500 02/24/17 1530   BP: 139/76  140/73 145/66   Pulse: 82  85 88   Resp: 21  15 19   Temp:       SpO2: 96% 98% 96% 93%   Weight:       Height:                Physical Exam   Constitutional: She is oriented to person, place, and time. She appears well-developed and well-nourished. She appears ill (chronically ill and debilitated). No distress. Pt has no evidence of obvious injury about hips, arms, legs and head. HENT:   Head: Normocephalic and atraumatic. Eyes: Conjunctivae are normal. No scleral icterus. Neck: Neck supple. No tracheal deviation present. Cardiovascular: Normal rate, regular rhythm, normal heart sounds and intact distal pulses. Exam reveals no gallop and no friction rub. No murmur heard. Pulmonary/Chest: Effort normal and breath sounds normal. She has no wheezes. She has no rales. Abdominal: Soft. She exhibits no distension. There is no tenderness. There is no rebound and no guarding. Musculoskeletal: She exhibits no edema. Bilateral legs are chronically weak per pt. Neurological: She is alert and oriented to person, place, and time. No new focal deficits   Skin: Skin is warm and dry. No rash noted. Psychiatric: She has a normal mood and affect. Nursing note and vitals reviewed.    Note written by Humberto Barraza, as dictated by Jaycob Rouse MD 1:56 PM      [facility]  ED Course       Procedures    ED EKG interpretation:  Rhythm: normal sinus rhythm; and regular . Rate (approx.): 82; Axis: normal; ST/T wave: normal  Note written by Humberto Wu, as dictated by Jaycob Rouse MD 2:46 PM    PROGRESS NOTE:  3:54 PM  Pt has fever of 101, and was lying on the floor for most of last night. Pt has possible UTI as the source of the fever, awaiting UA report. Will admit to hospitalist for IV hydration. CONSULT NOTE:  4:09 PM Jaycob Rouse MD spoke with Dr. Viridiana Stoll, Consult for Hospitalist.  Discussed available diagnostic tests and clinical findings. Dr. Evie Galvan will admit.

## 2017-02-24 NOTE — PROGRESS NOTES
Admission Medication Reconciliation:    Information obtained from: Patient, rx query     Significant PMH/Disease States:   Past Medical History:   Diagnosis Date    Neurological disorder     Multiple sclerosis, in remission 2011    Other ill-defined conditions(799.89)     Mitral Value Prolapse       Chief Complaint for this Admission:  fall, lethargy     Allergies:  Review of patient's allergies indicates no known allergies. Prior to Admission Medications:   Prior to Admission Medications   Prescriptions Last Dose Informant Patient Reported? Taking?   sertraline (ZOLOFT) 50 mg tablet 2/23/2017 at Unknown time  Yes Yes   Sig: Take 50 mg by mouth daily. tiZANidine (ZANAFLEX) 4 mg tablet 2/23/2017 at Unknown time  No Yes   Sig: Take 1 Tab by mouth every eight (8) hours as needed. Facility-Administered Medications: None         Comments/Recommendations: Medication list verified. Removed Permethrin. cream patient is not using at this time     [place], JigneshD

## 2017-02-24 NOTE — ROUTINE PROCESS
TRANSFER - OUT REPORT:    Verbal report given to Kinza STAPLES(name) on Darrick Due  being transferred to [address] [place] [address](unit) for routine progression of care       Report consisted of patients Situation, Background, Assessment and   Recommendations(SBAR). Information from the following report(s) SBAR, ED Summary, MAR, Recent Results and Cardiac Rhythm NSR was reviewed with the receiving nurse. Lines:   Peripheral IV 02/24/17 Left Antecubital (Active)   Site Assessment Clean, dry, & intact 2/24/2017  2:24 PM   Phlebitis Assessment 0 2/24/2017  2:24 PM   Infiltration Assessment 0 2/24/2017  2:24 PM   Dressing Status Clean, dry, & intact 2/24/2017  2:24 PM   Dressing Type Transparent 2/24/2017  2:24 PM   Hub Color/Line Status Pink;Flushed;Patent 2/24/2017  2:24 PM   Action Taken Blood drawn 2/24/2017  2:24 PM       Peripheral IV 02/24/17 Left Forearm (Active)   Site Assessment Clean, dry, & intact 2/24/2017  2:26 PM   Phlebitis Assessment 0 2/24/2017  2:26 PM   Infiltration Assessment 0 2/24/2017  2:26 PM   Dressing Status Clean, dry, & intact 2/24/2017  2:26 PM   Dressing Type Transparent 2/24/2017  2:26 PM   Hub Color/Line Status Pink;Flushed;Patent 2/24/2017  2:26 PM        Opportunity for questions and clarification was provided.       Patient transported with:   transport

## 2017-02-24 NOTE — PROGRESS NOTES
TRANSFER - IN REPORT:    Verbal report received from [facility] (name) on Rosy Orts  being received from ED (unit) for routine progression of care      Report consisted of patients Situation, Background, Assessment and   Recommendations(SBAR). Information from the following report(s) SBAR and Kardex was reviewed with the receiving nurse. Opportunity for questions and clarification was provided. Assessment completed upon patients arrival to unit and care assumed.

## 2017-02-25 ENCOUNTER — APPOINTMENT (OUTPATIENT)
Dept: MRI IMAGING | Age: 62
DRG: 060 | End: 2017-02-25
Attending: PSYCHIATRY & NEUROLOGY
Payer: MEDICARE

## 2017-02-25 LAB
ANION GAP BLD CALC-SCNC: 9 MMOL/L (ref 5–15)
ATRIAL RATE: 82 BPM
BASOPHILS # BLD AUTO: 0 K/UL (ref 0–0.1)
BASOPHILS # BLD: 0 % (ref 0–1)
BUN SERPL-MCNC: 10 MG/DL (ref 6–20)
BUN/CREAT SERPL: 16 (ref 12–20)
CALCIUM SERPL-MCNC: 8.1 MG/DL (ref 8.5–10.1)
CALCULATED P AXIS, ECG09: 42 DEGREES
CALCULATED R AXIS, ECG10: 50 DEGREES
CALCULATED T AXIS, ECG11: 43 DEGREES
CHLORIDE SERPL-SCNC: 107 MMOL/L (ref 97–108)
CO2 SERPL-SCNC: 26 MMOL/L (ref 21–32)
CREAT SERPL-MCNC: 0.61 MG/DL (ref 0.55–1.02)
DIAGNOSIS, 93000: NORMAL
DIFFERENTIAL METHOD BLD: ABNORMAL
EOSINOPHIL # BLD: 0 K/UL (ref 0–0.4)
EOSINOPHIL NFR BLD: 0 % (ref 0–7)
ERYTHROCYTE [DISTWIDTH] IN BLOOD BY AUTOMATED COUNT: 14 % (ref 11.5–14.5)
GLUCOSE SERPL-MCNC: 102 MG/DL (ref 65–100)
HCT VFR BLD AUTO: 34.5 % (ref 35–47)
HGB BLD-MCNC: 10.9 G/DL (ref 11.5–16)
LYMPHOCYTES # BLD AUTO: 14 % (ref 12–49)
LYMPHOCYTES # BLD: 0.6 K/UL (ref 0.8–3.5)
MAGNESIUM SERPL-MCNC: 1.9 MG/DL (ref 1.6–2.4)
MCH RBC QN AUTO: 26.5 PG (ref 26–34)
MCHC RBC AUTO-ENTMCNC: 31.6 G/DL (ref 30–36.5)
MCV RBC AUTO: 83.7 FL (ref 80–99)
MONOCYTES # BLD: 0.5 K/UL (ref 0–1)
MONOCYTES NFR BLD AUTO: 11 % (ref 5–13)
NEUTS SEG # BLD: 3.3 K/UL (ref 1.8–8)
NEUTS SEG NFR BLD AUTO: 75 % (ref 32–75)
P-R INTERVAL, ECG05: 140 MS
PHOSPHATE SERPL-MCNC: 3.1 MG/DL (ref 2.6–4.7)
PLATELET # BLD AUTO: 217 K/UL (ref 150–400)
PLATELET COMMENTS,PCOM: ABNORMAL
POTASSIUM SERPL-SCNC: 3.4 MMOL/L (ref 3.5–5.1)
Q-T INTERVAL, ECG07: 374 MS
QRS DURATION, ECG06: 62 MS
QTC CALCULATION (BEZET), ECG08: 436 MS
RBC # BLD AUTO: 4.12 M/UL (ref 3.8–5.2)
RBC MORPH BLD: ABNORMAL
RBC MORPH BLD: ABNORMAL
SODIUM SERPL-SCNC: 142 MMOL/L (ref 136–145)
VENTRICULAR RATE, ECG03: 82 BPM
WBC # BLD AUTO: 4.4 K/UL (ref 3.6–11)

## 2017-02-25 PROCEDURE — 65270000032 HC RM SEMIPRIVATE

## 2017-02-25 PROCEDURE — 74011250636 HC RX REV CODE- 250/636: Performed by: INTERNAL MEDICINE

## 2017-02-25 PROCEDURE — 74011250637 HC RX REV CODE- 250/637: Performed by: INTERNAL MEDICINE

## 2017-02-25 PROCEDURE — A9585 GADOBUTROL INJECTION: HCPCS | Performed by: INTERNAL MEDICINE

## 2017-02-25 PROCEDURE — 84100 ASSAY OF PHOSPHORUS: CPT | Performed by: INTERNAL MEDICINE

## 2017-02-25 PROCEDURE — 74011000258 HC RX REV CODE- 258: Performed by: PSYCHIATRY & NEUROLOGY

## 2017-02-25 PROCEDURE — 70553 MRI BRAIN STEM W/O & W/DYE: CPT

## 2017-02-25 PROCEDURE — 83735 ASSAY OF MAGNESIUM: CPT | Performed by: INTERNAL MEDICINE

## 2017-02-25 PROCEDURE — 72156 MRI NECK SPINE W/O & W/DYE: CPT

## 2017-02-25 PROCEDURE — 74011250637 HC RX REV CODE- 250/637: Performed by: FAMILY MEDICINE

## 2017-02-25 PROCEDURE — 85025 COMPLETE CBC W/AUTO DIFF WBC: CPT | Performed by: INTERNAL MEDICINE

## 2017-02-25 PROCEDURE — 80048 BASIC METABOLIC PNL TOTAL CA: CPT | Performed by: INTERNAL MEDICINE

## 2017-02-25 PROCEDURE — 36415 COLL VENOUS BLD VENIPUNCTURE: CPT | Performed by: INTERNAL MEDICINE

## 2017-02-25 PROCEDURE — 74011250636 HC RX REV CODE- 250/636: Performed by: PSYCHIATRY & NEUROLOGY

## 2017-02-25 PROCEDURE — 74011250636 HC RX REV CODE- 250/636: Performed by: FAMILY MEDICINE

## 2017-02-25 RX ORDER — SODIUM CHLORIDE 0.9 % (FLUSH) 0.9 %
10 SYRINGE (ML) INJECTION
Status: COMPLETED | OUTPATIENT
Start: 2017-02-25 | End: 2017-02-25

## 2017-02-25 RX ADMIN — TIZANIDINE 4 MG: 4 TABLET ORAL at 21:40

## 2017-02-25 RX ADMIN — GADOBUTROL 7.5 ML: 604.72 INJECTION INTRAVENOUS at 15:25

## 2017-02-25 RX ADMIN — DIPHENHYDRAMINE HYDROCHLORIDE 12.5 MG: 50 INJECTION, SOLUTION INTRAMUSCULAR; INTRAVENOUS at 21:38

## 2017-02-25 RX ADMIN — Medication 10 ML: at 21:42

## 2017-02-25 RX ADMIN — HEPARIN SODIUM 5000 UNITS: 5000 INJECTION, SOLUTION INTRAVENOUS; SUBCUTANEOUS at 13:15

## 2017-02-25 RX ADMIN — ACETAMINOPHEN 650 MG: 325 TABLET, FILM COATED ORAL at 04:01

## 2017-02-25 RX ADMIN — Medication 10 ML: at 16:00

## 2017-02-25 RX ADMIN — HEPARIN SODIUM 5000 UNITS: 5000 INJECTION, SOLUTION INTRAVENOUS; SUBCUTANEOUS at 03:41

## 2017-02-25 RX ADMIN — SERTRALINE HYDROCHLORIDE 50 MG: 50 TABLET ORAL at 21:40

## 2017-02-25 RX ADMIN — HEPARIN SODIUM 5000 UNITS: 5000 INJECTION, SOLUTION INTRAVENOUS; SUBCUTANEOUS at 21:38

## 2017-02-25 RX ADMIN — SODIUM CHLORIDE 1000 MG: 900 INJECTION, SOLUTION INTRAVENOUS at 13:15

## 2017-02-25 NOTE — CONSULTS
Consult dictated. Established diagnosis of MS since 1989, now admitted with worsening of R leg weakness and new left leg weakness. Check MRI brain and C spine. Initiate solumedrol.  PT eval.  Macy Fontaine MD

## 2017-02-25 NOTE — PROGRESS NOTES
Bedside shift change report given to [address] (oncoming nurse) by Romayne Cedars RN (offgoing nurse). Report included the following information SBAR, Kardex, MAR and Recent Results.

## 2017-02-25 NOTE — PROGRESS NOTES
Bedside shift change report given to Erum Gibson RN (oncoming nurse) by Mariela Fong RN (offgoing nurse). Report included the following information SBAR, Kardex and MAR.

## 2017-02-25 NOTE — CONSULTS
[address]   [address] [facility], [address] [place], [place] [address]   0 [facility]       Name:  Cathryn Santiago   MR#:  [id number]   :  1955   Account #:  [de-identified]    Date of Consultation:  2017   Date of Adm:  2017       REQUESTING PHYSICIAN: Susy Hernandez MD    REASON FOR EVALUATION: Multiple sclerosis. HISTORY OF PRESENT ILLNESS: The patient is a 1year-old female   with a past medical history of multiple sclerosis that was diagnosed in    [facility] after she developed optic neuritis. She had been following up   with Dr. Jewel Monahan in the past and saw Dr. Ean Chiang in December of   last year. She states that she has done quite well over the years and   had been fairly functional, except for some right lower extremity   weakness and spasticity. Her last MRI of the brain in December was   stable as per records. She used to be on Avonex, which she took for   many years and then stopped taking it in  and there was a plan to   restart it, but she has not been able to do that as yet. Botulinum toxin   injections were also considered for the right leg spasticity, but she   could not do it because of financial reasons. Over the past couple of   days, she noticed that her left leg was starting to feel weak. The right   leg was also feeling weaker than usual. She fell last night and was   unable to get up. She was brought into the hospital for further workup   and treatment. She denies any symptoms in the upper extremities. No   changes in vision, speech or swallowing ability. She reports some pain   in the left arm. She has also had urinary frequency. PAST MEDICAL HISTORY: As mentioned above. HOME MEDICATIONS   1. Tizanidine. 2. Sertraline. ALLERGIES: NONE. SOCIAL HISTORY: Lives independently. No alcohol use. Uses a cane   for ambulation. PHYSICAL EXAMINATION   GENERAL: The patient is alert, fully oriented. VITAL SIGNS: Blood pressure 99/60, temperature 98.9, pulse is 91. NEUROLOGIC: Speech is clear, comprehension is normal. Pupils are   equal, round, reactive. Extraocular movements are full. Face is   symmetric. Tongue is midline. Hearing is baseline. Muscle tone is   normal in both upper extremities and her right leg is spastic. Muscle   tone is normal in the left leg. Strength is 1-2 in the right leg and 4/5 in   the left leg. Deep tendon reflexes are 2/2. Ankle jerk was difficult to   elicit on the right and hypoactive on the left. Toes are mute bilaterally. Sensation is impaired to vibration at the ankles. Gait exam was   deferred. HEART: Regular rate. CHEST: Clear. ABDOMEN: Soft, nontender, positive bowel sounds. EXTREMITIES: No edema. LABORATORY DATA: CBC with WBC of 4.4, hemoglobin 10.8,   hematocrit 34.5. Urinalysis is negative for any infection. Chemistry is   unremarkable. MRI of the brain in 06/2016 showed multiple areas of T2 hyperintense   signal throughout the periventricular white matter, consistent with the   history of multiple sclerosis. She has lesions within the angely and   cervical cord as well. No lesions were enhancing at that time. ASSESSMENT AND PLAN: A 61-year-old female with an established   diagnosis of multiple sclerosis who had been doing well and was at   baseline until a few days ago when she developed weakness in both   lower extremities. She has baseline spasticity in the right leg, it felt   worse and also the left leg felt weak, which is new for her. I suspect a   new lesion related to multiple sclerosis, possibly in the spinal cord. We   will check MRI of the brain and cervical spine and will initiate Solu-  Medrol. If the MRI does not show any acute lesions, we may not need   to continue the Solu-Medrol. We will have physical therapy evaluate   it. She is in the process of starting immunomodulating therapy for the   long-term as outpatient and we will continue to followup in this regard   as outpatient.  I will followup after the imaging studies are completed. Thank you for this consultation.          Xi Harrell MD      AS / [place]   D:  02/25/2017   10:48   T:  02/25/2017   11:28   Job #:  [id number]

## 2017-02-25 NOTE — ROUTINE PROCESS
Bedside shift change report given to manuel helton rn (oncoming nurse) by Xenia Pompa (offgoing nurse). Report included the following information SBAR and Kardex.

## 2017-02-25 NOTE — PROGRESS NOTES
Hospitalist Progress Note            Daily Progress Note: 2017    Assessment/Plan:   1. Multiple sclerosis with possible exacerbation, resulting in a fall - appreciate neurology consult; continue IV Solumedrol; needs PT/OT evaluation  2. Fever - continue to assess for infection as cause of worsening weakness --> UA fairly unremarkable; blood culture collected and pending; continue empiric Zosyn for now, then streamline based on culture results  3. Mitral valve prolapse    DISPO: TBD       Subjective:   Feels OK. Remains weak. Overnight events discussed with nursing. Review of Systems:   No CP, no SOB. Objective:     Visit Vitals    BP 99/60 (BP 1 Location: Right arm, BP Patient Position: At rest)    Pulse 91    Temp 98.9 °F (37.2 °C)    Resp 16    Ht 5' 3\" (1.6 m)    Wt 72.6 kg (160 lb)    SpO2 93%    Breastfeeding No    BMI 28.34 kg/m2      O2 Device: Room air    Temp (24hrs), Av.4 °F (37.4 °C), Min:98.5 °F (36.9 °C), Max:100.8 °F (38.2 °C)      701 -  1900  In: 720 [P.O.:720]  Out: -      190 -  0700  In: 400 [P.O.:400]  Out: -     EXAM:  General: WD, WN. Alert, cooperative, no acute distress    HEENT: NC, atraumatic. PERRL, EOMI. Anicteric sclerae. Neck:   Supple, trachea midline  Lungs:  CTA bilaterally. No Wheezing/Rhonchi/Rales. Heart:  Regular rhythm,  No murmur (), No Rubs, No Gallops  Abdomen: Soft, Non distended, Non tender.  +Bowel sounds, no HSM  Extremities: Weakness in the LEs, R>L  Neurologic:  CN 2-12 gi, Alert and oriented X 3. No acute neurological distress   Psych:   Good insight. Not anxious nor agitated.         Data Review:     Recent Results (from the past 24 hour(s))   URINALYSIS W/ REFLEX CULTURE    Collection Time: 17  5:39 PM   Result Value Ref Range    Color YELLOW/STRAW      Appearance CLEAR CLEAR      Specific gravity 1.020 1.003 - 1.030      pH (UA) 6.0 5.0 - 8.0      Protein NEGATIVE  NEG mg/dL    Glucose NEGATIVE  NEG mg/dL    Ketone NEGATIVE  NEG mg/dL    Bilirubin NEGATIVE  NEG      Blood LARGE (A) NEG      Urobilinogen 0.2 0.2 - 1.0 EU/dL    Nitrites NEGATIVE  NEG      Leukocyte Esterase NEGATIVE  NEG      WBC 0-4 0 - 4 /hpf    RBC 10-20 0 - 5 /hpf    Epithelial cells FEW FEW /lpf    Bacteria NEGATIVE  NEG /hpf    UA:UC IF INDICATED CULTURE NOT INDICATED BY UA RESULT CNI      Hyaline cast 0-2 0 - 5 /lpf   INFLUENZA A & B AG (RAPID TEST)    Collection Time: 02/24/17  5:39 PM   Result Value Ref Range    Influenza A Antigen NEGATIVE  NEG      Influenza B Antigen NEGATIVE  NEG     MAGNESIUM    Collection Time: 02/25/17  6:13 AM   Result Value Ref Range    Magnesium 1.9 1.6 - 2.4 mg/dL   PHOSPHORUS    Collection Time: 02/25/17  6:13 AM   Result Value Ref Range    Phosphorus 3.1 2.6 - 4.7 MG/DL   CBC WITH AUTOMATED DIFF    Collection Time: 02/25/17  6:13 AM   Result Value Ref Range    WBC 4.4 3.6 - 11.0 K/uL    RBC 4.12 3.80 - 5.20 M/uL    HGB 10.9 (L) 11.5 - 16.0 g/dL    HCT 34.5 (L) 35.0 - 47.0 %    MCV 83.7 80.0 - 99.0 FL    MCH 26.5 26.0 - 34.0 PG    MCHC 31.6 30.0 - 36.5 g/dL    RDW 14.0 11.5 - 14.5 %    PLATELET 915 729 - 290 K/uL    NEUTROPHILS 75 32 - 75 %    LYMPHOCYTES 14 12 - 49 %    MONOCYTES 11 5 - 13 %    EOSINOPHILS 0 0 - 7 %    BASOPHILS 0 0 - 1 %    ABS. NEUTROPHILS 3.3 1.8 - 8.0 K/UL    ABS. LYMPHOCYTES 0.6 (L) 0.8 - 3.5 K/UL    ABS. MONOCYTES 0.5 0.0 - 1.0 K/UL    ABS. EOSINOPHILS 0.0 0.0 - 0.4 K/UL    ABS.  BASOPHILS 0.0 0.0 - 0.1 K/UL    DF SMEAR SCANNED      PLATELET COMMENTS LARGE PLATELETS      RBC COMMENTS ANISOCYTOSIS  1+        RBC COMMENTS MICROCYTOSIS  1+       METABOLIC PANEL, BASIC    Collection Time: 02/25/17  6:13 AM   Result Value Ref Range    Sodium 142 136 - 145 mmol/L    Potassium 3.4 (L) 3.5 - 5.1 mmol/L    Chloride 107 97 - 108 mmol/L    CO2 26 21 - 32 mmol/L    Anion gap 9 5 - 15 mmol/L    Glucose 102 (H) 65 - 100 mg/dL    BUN 10 6 - 20 MG/DL    Creatinine 0.61 0.55 - 1.02 MG/DL BUN/Creatinine ratio 16 12 - 20      GFR est AA >60 >60 ml/min/1.73m2    GFR est non-AA >60 >60 ml/min/1.73m2    Calcium 8.1 (L) 8.5 - 10.1 MG/DL       Principal Problem:    Multiple sclerosis exacerbation (HCC) (2/24/2017)        Medications reviewed  Current Facility-Administered Medications   Medication Dose Route Frequency    [COMPLETED] sodium chloride (NS) flush 10 mL  10 mL IntraVENous RAD ONCE    acetaminophen (TYLENOL) tablet 650 mg  650 mg Oral Q4H PRN    sodium chloride (NS) flush 5-10 mL  5-10 mL IntraVENous Q8H    sodium chloride (NS) flush 5-10 mL  5-10 mL IntraVENous PRN    heparin (porcine) injection 5,000 Units  5,000 Units SubCUTAneous Q8H    tiZANidine (ZANAFLEX) tablet 4 mg  4 mg Oral Q8H PRN    diphenhydrAMINE (BENADRYL) injection 12.5 mg  12.5 mg IntraVENous Q6H PRN    sertraline (ZOLOFT) tablet 50 mg  50 mg Oral DAILY       DVT prophylaxis: SC heparin    Total time spent with patient: 15 minutes    Tony Michel MD

## 2017-02-26 LAB
ANION GAP BLD CALC-SCNC: 10 MMOL/L (ref 5–15)
BASOPHILS # BLD AUTO: 0 K/UL (ref 0–0.1)
BASOPHILS # BLD: 0 % (ref 0–1)
BUN SERPL-MCNC: 9 MG/DL (ref 6–20)
BUN/CREAT SERPL: 18 (ref 12–20)
CALCIUM SERPL-MCNC: 8.6 MG/DL (ref 8.5–10.1)
CHLORIDE SERPL-SCNC: 105 MMOL/L (ref 97–108)
CO2 SERPL-SCNC: 27 MMOL/L (ref 21–32)
CREAT SERPL-MCNC: 0.49 MG/DL (ref 0.55–1.02)
EOSINOPHIL # BLD: 0 K/UL (ref 0–0.4)
EOSINOPHIL NFR BLD: 0 % (ref 0–7)
ERYTHROCYTE [DISTWIDTH] IN BLOOD BY AUTOMATED COUNT: 13.5 % (ref 11.5–14.5)
GLUCOSE SERPL-MCNC: 141 MG/DL (ref 65–100)
HCT VFR BLD AUTO: 36.3 % (ref 35–47)
HGB BLD-MCNC: 11.7 G/DL (ref 11.5–16)
LYMPHOCYTES # BLD AUTO: 15 % (ref 12–49)
LYMPHOCYTES # BLD: 0.6 K/UL (ref 0.8–3.5)
MCH RBC QN AUTO: 26.7 PG (ref 26–34)
MCHC RBC AUTO-ENTMCNC: 32.2 G/DL (ref 30–36.5)
MCV RBC AUTO: 82.7 FL (ref 80–99)
MONOCYTES # BLD: 0.2 K/UL (ref 0–1)
MONOCYTES NFR BLD AUTO: 6 % (ref 5–13)
NEUTS SEG # BLD: 3.2 K/UL (ref 1.8–8)
NEUTS SEG NFR BLD AUTO: 79 % (ref 32–75)
PLATELET # BLD AUTO: 240 K/UL (ref 150–400)
POTASSIUM SERPL-SCNC: 3.5 MMOL/L (ref 3.5–5.1)
RBC # BLD AUTO: 4.39 M/UL (ref 3.8–5.2)
SODIUM SERPL-SCNC: 142 MMOL/L (ref 136–145)
WBC # BLD AUTO: 4.1 K/UL (ref 3.6–11)

## 2017-02-26 PROCEDURE — 74011000258 HC RX REV CODE- 258: Performed by: PSYCHIATRY & NEUROLOGY

## 2017-02-26 PROCEDURE — 74011250636 HC RX REV CODE- 250/636: Performed by: PSYCHIATRY & NEUROLOGY

## 2017-02-26 PROCEDURE — 36415 COLL VENOUS BLD VENIPUNCTURE: CPT | Performed by: INTERNAL MEDICINE

## 2017-02-26 PROCEDURE — 74011250637 HC RX REV CODE- 250/637: Performed by: INTERNAL MEDICINE

## 2017-02-26 PROCEDURE — 74011250637 HC RX REV CODE- 250/637: Performed by: FAMILY MEDICINE

## 2017-02-26 PROCEDURE — 85025 COMPLETE CBC W/AUTO DIFF WBC: CPT | Performed by: INTERNAL MEDICINE

## 2017-02-26 PROCEDURE — 74011250636 HC RX REV CODE- 250/636: Performed by: INTERNAL MEDICINE

## 2017-02-26 PROCEDURE — 80048 BASIC METABOLIC PNL TOTAL CA: CPT | Performed by: INTERNAL MEDICINE

## 2017-02-26 PROCEDURE — 65270000032 HC RM SEMIPRIVATE

## 2017-02-26 RX ORDER — ALPRAZOLAM 0.25 MG/1
0.25 TABLET ORAL
Status: DISCONTINUED | OUTPATIENT
Start: 2017-02-26 | End: 2017-03-03 | Stop reason: HOSPADM

## 2017-02-26 RX ADMIN — HEPARIN SODIUM 5000 UNITS: 5000 INJECTION, SOLUTION INTRAVENOUS; SUBCUTANEOUS at 19:15

## 2017-02-26 RX ADMIN — TIZANIDINE 4 MG: 4 TABLET ORAL at 22:03

## 2017-02-26 RX ADMIN — SERTRALINE HYDROCHLORIDE 50 MG: 50 TABLET ORAL at 21:57

## 2017-02-26 RX ADMIN — Medication 10 ML: at 15:31

## 2017-02-26 RX ADMIN — HEPARIN SODIUM 5000 UNITS: 5000 INJECTION, SOLUTION INTRAVENOUS; SUBCUTANEOUS at 05:15

## 2017-02-26 RX ADMIN — ALPRAZOLAM 0.25 MG: 0.25 TABLET ORAL at 11:33

## 2017-02-26 RX ADMIN — HEPARIN SODIUM 5000 UNITS: 5000 INJECTION, SOLUTION INTRAVENOUS; SUBCUTANEOUS at 11:32

## 2017-02-26 RX ADMIN — Medication 10 ML: at 21:58

## 2017-02-26 RX ADMIN — ACETAMINOPHEN 650 MG: 325 TABLET, FILM COATED ORAL at 05:33

## 2017-02-26 RX ADMIN — SODIUM CHLORIDE 1000 MG: 900 INJECTION, SOLUTION INTRAVENOUS at 15:30

## 2017-02-26 NOTE — PROGRESS NOTES
Spiritual Care Partner Volunteer visited patient in Med Surg on 02/26/17. Documented by:    ALDAIR Christianson M.S.Div.   [address] [place] [address] ([id number])

## 2017-02-26 NOTE — PROGRESS NOTES
Bedside shift change report given to Desiree Marr [id number] (oncoming nurse) by Deborah Vilchis (offgoing nurse). Report included the following information SBAR, Kardex and MAR.

## 2017-02-26 NOTE — ROUTINE PROCESS
Bedside and Verbal shift change report given to Antionette [address] (oncoming nurse) by Oral benavides RN (offgoing nurse). Report included the following information SBAR, Kardex and MAR.

## 2017-02-26 NOTE — PROGRESS NOTES
Hospitalist Progress Note            Daily Progress Note: 2017    Assessment/Plan:   1. Multiple sclerosis with possible exacerbation, resulting in a fall - appreciate neurology consult; continue IV Solumedrol; needs PT/OT evaluation  2. Fever - continue to assess for infection as cause of worsening weakness --> UA fairly unremarkable; blood culture collected and pending (NGTD); continue empiric Zosyn for now, then streamline based on culture results  3. Mitral valve prolapse    DISPO: home once better       Subjective:   Feeling a little better. Overnight events discussed with nursing. Review of Systems:   No CP, no SOB. Objective:     Visit Vitals    /76 (BP 1 Location: Right arm, BP Patient Position: At rest)    Pulse (!) 58    Temp 97.9 °F (36.6 °C)    Resp 16    Ht 5' 3\" (1.6 m)    Wt 72.6 kg (160 lb)    SpO2 93%    Breastfeeding No    BMI 28.34 kg/m2      O2 Device: Room air    Temp (24hrs), Av.6 °F (37 °C), Min:97.9 °F (36.6 °C), Max:99.1 °F (37.3 °C)            0701 -  1900  In: 1360 [P.O.:1360]  Out: -     EXAM:  General: WD, WN. Alert, cooperative, no acute distress    HEENT: NC, atraumatic. PERRL, EOMI. Anicteric sclerae. Neck:   Supple, trachea midline  Lungs:  CTA bilaterally. No Wheezing/Rhonchi/Rales. Heart:  Regular rhythm,  No murmur (), No Rubs, No Gallops  Abdomen: Soft, Non distended, Non tender.  +Bowel sounds, no HSM  Extremities: No c/c/e  Neurologic:  Bilateral LE weakness  Psych:   Good insight. Not anxious nor agitated.         Data Review:     Recent Results (from the past 24 hour(s))   CBC WITH AUTOMATED DIFF    Collection Time: 17  5:19 AM   Result Value Ref Range    WBC 4.1 3.6 - 11.0 K/uL    RBC 4.39 3.80 - 5.20 M/uL    HGB 11.7 11.5 - 16.0 g/dL    HCT 36.3 35.0 - 47.0 %    MCV 82.7 80.0 - 99.0 FL    MCH 26.7 26.0 - 34.0 PG    MCHC 32.2 30.0 - 36.5 g/dL    RDW 13.5 11.5 - 14.5 %    PLATELET 556 150 - 684 K/uL NEUTROPHILS 79 (H) 32 - 75 %    LYMPHOCYTES 15 12 - 49 %    MONOCYTES 6 5 - 13 %    EOSINOPHILS 0 0 - 7 %    BASOPHILS 0 0 - 1 %    ABS. NEUTROPHILS 3.2 1.8 - 8.0 K/UL    ABS. LYMPHOCYTES 0.6 (L) 0.8 - 3.5 K/UL    ABS. MONOCYTES 0.2 0.0 - 1.0 K/UL    ABS. EOSINOPHILS 0.0 0.0 - 0.4 K/UL    ABS.  BASOPHILS 0.0 0.0 - 0.1 K/UL   METABOLIC PANEL, BASIC    Collection Time: 02/26/17  5:19 AM   Result Value Ref Range    Sodium 142 136 - 145 mmol/L    Potassium 3.5 3.5 - 5.1 mmol/L    Chloride 105 97 - 108 mmol/L    CO2 27 21 - 32 mmol/L    Anion gap 10 5 - 15 mmol/L    Glucose 141 (H) 65 - 100 mg/dL    BUN 9 6 - 20 MG/DL    Creatinine 0.49 (L) 0.55 - 1.02 MG/DL    BUN/Creatinine ratio 18 12 - 20      GFR est AA >60 >60 ml/min/1.73m2    GFR est non-AA >60 >60 ml/min/1.73m2    Calcium 8.6 8.5 - 10.1 MG/DL       Principal Problem:    Multiple sclerosis exacerbation (HCC) (2/24/2017)        Medications reviewed  Current Facility-Administered Medications   Medication Dose Route Frequency    acetaminophen (TYLENOL) tablet 650 mg  650 mg Oral Q4H PRN    sodium chloride (NS) flush 5-10 mL  5-10 mL IntraVENous Q8H    sodium chloride (NS) flush 5-10 mL  5-10 mL IntraVENous PRN    heparin (porcine) injection 5,000 Units  5,000 Units SubCUTAneous Q8H    tiZANidine (ZANAFLEX) tablet 4 mg  4 mg Oral Q8H PRN    diphenhydrAMINE (BENADRYL) injection 12.5 mg  12.5 mg IntraVENous Q6H PRN    sertraline (ZOLOFT) tablet 50 mg  50 mg Oral DAILY       DVT prophylaxis: SC heparin    Total time spent with patient: 10 minutes    Yosef Hernandez MD

## 2017-02-27 LAB
ANION GAP BLD CALC-SCNC: 8 MMOL/L (ref 5–15)
BASOPHILS # BLD AUTO: 0 K/UL (ref 0–0.1)
BASOPHILS # BLD: 0 % (ref 0–1)
BUN SERPL-MCNC: 12 MG/DL (ref 6–20)
BUN/CREAT SERPL: 18 (ref 12–20)
CALCIUM SERPL-MCNC: 8 MG/DL (ref 8.5–10.1)
CHLORIDE SERPL-SCNC: 104 MMOL/L (ref 97–108)
CO2 SERPL-SCNC: 30 MMOL/L (ref 21–32)
CREAT SERPL-MCNC: 0.65 MG/DL (ref 0.55–1.02)
EOSINOPHIL # BLD: 0 K/UL (ref 0–0.4)
EOSINOPHIL NFR BLD: 0 % (ref 0–7)
ERYTHROCYTE [DISTWIDTH] IN BLOOD BY AUTOMATED COUNT: 13.3 % (ref 11.5–14.5)
GLUCOSE SERPL-MCNC: 176 MG/DL (ref 65–100)
HCT VFR BLD AUTO: 36.2 % (ref 35–47)
HGB BLD-MCNC: 11.6 G/DL (ref 11.5–16)
LYMPHOCYTES # BLD AUTO: 13 % (ref 12–49)
LYMPHOCYTES # BLD: 0.8 K/UL (ref 0.8–3.5)
MCH RBC QN AUTO: 26.7 PG (ref 26–34)
MCHC RBC AUTO-ENTMCNC: 32 G/DL (ref 30–36.5)
MCV RBC AUTO: 83.4 FL (ref 80–99)
MONOCYTES # BLD: 0.3 K/UL (ref 0–1)
MONOCYTES NFR BLD AUTO: 4 % (ref 5–13)
NEUTS SEG # BLD: 5.3 K/UL (ref 1.8–8)
NEUTS SEG NFR BLD AUTO: 83 % (ref 32–75)
PLATELET # BLD AUTO: 265 K/UL (ref 150–400)
POTASSIUM SERPL-SCNC: 3.6 MMOL/L (ref 3.5–5.1)
RBC # BLD AUTO: 4.34 M/UL (ref 3.8–5.2)
SODIUM SERPL-SCNC: 142 MMOL/L (ref 136–145)
WBC # BLD AUTO: 6.3 K/UL (ref 3.6–11)

## 2017-02-27 PROCEDURE — 65270000032 HC RM SEMIPRIVATE

## 2017-02-27 PROCEDURE — 74011250636 HC RX REV CODE- 250/636: Performed by: INTERNAL MEDICINE

## 2017-02-27 PROCEDURE — G8978 MOBILITY CURRENT STATUS: HCPCS

## 2017-02-27 PROCEDURE — G8979 MOBILITY GOAL STATUS: HCPCS

## 2017-02-27 PROCEDURE — 74011250636 HC RX REV CODE- 250/636: Performed by: FAMILY MEDICINE

## 2017-02-27 PROCEDURE — 97162 PT EVAL MOD COMPLEX 30 MIN: CPT

## 2017-02-27 PROCEDURE — 36415 COLL VENOUS BLD VENIPUNCTURE: CPT | Performed by: INTERNAL MEDICINE

## 2017-02-27 PROCEDURE — 74011250637 HC RX REV CODE- 250/637: Performed by: INTERNAL MEDICINE

## 2017-02-27 PROCEDURE — 80048 BASIC METABOLIC PNL TOTAL CA: CPT | Performed by: INTERNAL MEDICINE

## 2017-02-27 PROCEDURE — 74011250636 HC RX REV CODE- 250/636: Performed by: PSYCHIATRY & NEUROLOGY

## 2017-02-27 PROCEDURE — 85025 COMPLETE CBC W/AUTO DIFF WBC: CPT | Performed by: INTERNAL MEDICINE

## 2017-02-27 PROCEDURE — 74011000258 HC RX REV CODE- 258: Performed by: PSYCHIATRY & NEUROLOGY

## 2017-02-27 PROCEDURE — 74011250637 HC RX REV CODE- 250/637: Performed by: FAMILY MEDICINE

## 2017-02-27 RX ADMIN — Medication 10 ML: at 09:03

## 2017-02-27 RX ADMIN — ACETAMINOPHEN 650 MG: 325 TABLET, FILM COATED ORAL at 23:47

## 2017-02-27 RX ADMIN — HEPARIN SODIUM 5000 UNITS: 5000 INJECTION, SOLUTION INTRAVENOUS; SUBCUTANEOUS at 12:25

## 2017-02-27 RX ADMIN — Medication 10 ML: at 19:18

## 2017-02-27 RX ADMIN — TIZANIDINE 4 MG: 4 TABLET ORAL at 23:18

## 2017-02-27 RX ADMIN — SODIUM CHLORIDE 1000 MG: 900 INJECTION, SOLUTION INTRAVENOUS at 12:17

## 2017-02-27 RX ADMIN — TIZANIDINE 4 MG: 4 TABLET ORAL at 12:26

## 2017-02-27 RX ADMIN — SERTRALINE HYDROCHLORIDE 50 MG: 50 TABLET ORAL at 23:18

## 2017-02-27 RX ADMIN — ACETAMINOPHEN 650 MG: 325 TABLET, FILM COATED ORAL at 09:01

## 2017-02-27 RX ADMIN — Medication 10 ML: at 23:18

## 2017-02-27 RX ADMIN — DIPHENHYDRAMINE HYDROCHLORIDE 12.5 MG: 50 INJECTION, SOLUTION INTRAMUSCULAR; INTRAVENOUS at 23:18

## 2017-02-27 RX ADMIN — HEPARIN SODIUM 5000 UNITS: 5000 INJECTION, SOLUTION INTRAVENOUS; SUBCUTANEOUS at 05:43

## 2017-02-27 RX ADMIN — HEPARIN SODIUM 5000 UNITS: 5000 INJECTION, SOLUTION INTRAVENOUS; SUBCUTANEOUS at 19:18

## 2017-02-27 NOTE — PROGRESS NOTES
Bedside shift change report given to Je Benoit (oncoming nurse) by Zack Powers (offgoing nurse). Report included the following information SBAR, Kardex, Intake/Output, MAR and Recent Results.

## 2017-02-27 NOTE — ROUTINE PROCESS
Bedside and Verbal shift change report given to SÖDERBÄRK, [address] (oncoming nurse) by [facility], RN (offgoing nurse). Report included the following information SBAR, Kardex and MAR.

## 2017-02-27 NOTE — PROGRESS NOTES
Reviewed chart. Met with pt at length this pm. She lives alone in a [facility] housing [facility] apartment (low income housing). She has steps to enter but then can use elevator to her apt. Pt received meals on wheels. In her residence, there is a nurse and  who are very supportive and assist when needed. Finances are limited. Discussed inpt rehab in which she has never needed. Reviewed facilities and made referral to [facility] due to location per pt. Await OT evaluation and decision from rehab. They will need to get insurance authorization if accepted by rehab.   Camie Jeans, LCSW

## 2017-02-27 NOTE — PROGRESS NOTES
Problem: Mobility Impaired (Adult and Pediatric)  Goal: *Acute Goals and Plan of Care (Insert Text)  Physical Therapy Goals  Initiated 2/27/2017  1. Patient will move from supine to sit and sit to supine , scoot up and down and roll side to side in bed with independence within 7 day(s). 2. Patient will transfer from bed to chair and chair to bed with contact guard assist using the least restrictive device within 7 day(s). 3. Patient will perform sit to stand with supervision/set-up within 7 day(s). 4. Patient will ambulate with minimal assistance for 50 feet with the least restrictive device within 7 day(s). 5. Patient will ascend/descend 5 stairs with right handrail(s) with minimal assistance within 7 day(s). 6. Patient will improve Corcoran Balance score by 7 points within 7 days. PHYSICAL THERAPY EVALUATION- NEURO POPULATION     Patient: Soumya Iyer (63 y.o. female)  Date: 2/27/2017  Primary Diagnosis: [facility]  Precautions: fall      ASSESSMENT :  Based on the objective data described below, the patient presents with impaired coordination, ataxic gait, impaired balance, LE weakness, and increased risk for falls as evidenced by Abbi Deter Balance Test score of 8/56. Patient received supine in bed, pleasant, and agreeable to therapy. Able to move supine to sit with CGA. Upon MMT, patient's primary weakness is in her R hip and overall R LE weaker than L LE. Patient reports altered sensation but was intact to light touch and deep pressure. Upon standing, she immediately presented with increased trunk sway and generally unsteadiness, moving her feet and requiring min A x1-2 to steady. Attempted gait forwards, however patient moderately ataxic with gait x2ft. Able to back up to chair x4 ft with min Ax2 and RW. Patient well below baseline level of functional independence and would greatly benefit from inpatient rehab at d/c. She is an excellent candidate and will tolerate 3 hrs therapy/day. Patient will benefit from skilled intervention to address the above impairments. Patients rehabilitation potential is considered to be Good  Factors which may influence rehabilitation potential include:   [ ]           None noted  [ ]           Mental ability/status  [X]           Medical condition  [ ]           Home/family situation and support systems  [ ]           Safety awareness  [ ]           Pain tolerance/management  [ ]           Other:        PLAN :  Recommendations and Planned Interventions:  [ ]             Bed Mobility Training             [ ]      Neuromuscular Re-Education  [ ]             Transfer Training                   [ ]      Orthotic/Prosthetic Training  [ ]             Gait Training                         [ ]      Modalities  [ ]             Therapeutic Exercises           [ ]      Edema Management/Control  [ ]             Therapeutic Activities            [ ]      Patient and Family Training/Education  [ ]             Other (comment):  Frequency/Duration: Patient will be followed by physical therapy 5 times a week to address goals. Discharge Recommendations: Inpatient Rehab  Further Equipment Recommendations for Discharge: TBD at rehab       SUBJECTIVE:   Patient stated I just feel like I can't make them do what I want.  referring to LEs in standing      OBJECTIVE DATA SUMMARY:   HISTORY:    Past Medical History:   Diagnosis Date    Neurological disorder       Multiple sclerosis, in remission 2011    Other ill-defined conditions(799.89)       Mitral Value Prolapse     Past Surgical History:   Procedure Laterality Date    ABDOMEN SURGERY PROC UNLISTED         hernia repair    HX HYSTERECTOMY         Prior Level of Function/Home Situation: Patient mod I with cane at home. Reports significant fall history and that a therapist has recently \"told (her) to use a walker. \" She has been getting progressively weaker in her B LEs over the last few weeks she says.  She lives alone in an apartment and is active in her [employer]. Personal factors and/or comorbidities impacting plan of care:      Home Situation  Home Environment: Apartment  # Steps to Enter: 5  Rails to Enter: Yes  Hand Rails : Bilateral  One/Two Story Residence: One story  Living Alone: Yes  Support Systems: Family member(s)  Patient Expects to be Discharged to[de-identified] Apartment  Current DME Used/Available at Home: [employer], straight, [employer] Ehrich, rolling, Wheelchair (wants a raised toilet seat)     EXAMINATION/PRESENTATION/DECISION MAKING:   Critical Behavior:  Orientation Level: Oriented X4  Cognition: Appropriate decision making, Appropriate for age attention/concentration, Appropriate safety awareness, Follows commands  Strength:    Strength: Generally decreased, functional (decreased most in R LE)   Coordination:  Coordination: Generally decreased, functional (L hand; mildly)  Tone & Sensation:   Tone: Normal  Sensation: Impaired (reports altered sensation in L UE and R LE; intact to LT )  Range Of Motion:  AROM: Generally decreased, functional  Functional Mobility:  Bed Mobility:  Supine to Sit: Contact guard assistance  Scooting: Contact guard assistance  Transfers:  Sit to Stand: Contact guard assistance (+ PT stabilizing RW)  Stand to Sit: Contact guard assistance (cues to back up to chair and reach for chair arms)  Stand Pivot Transfers: Minimum assistance     Balance:   Sitting: Intact; Without support  Standing: Impaired; With support  Standing - Static: Fair  Standing - Dynamic : Fair  Functional Measure  Corcoran Balance Test:      Sitting to Standin  Standing Unsupported: 0  Sitting with Back Unsupported: 3  Standing to Sitting: 3  Transfers: 1  Standing Unsupported with Eyes Closed: 0  Standing Unsupported with Feet Together: 0  Reach Forward with Outstretched Arm: 0   Object: 0  Turn to Look Over Shoulders: 0  Turn 360 Degrees: 0  Alternate Foot on Step/Stool: 0  Standing Unsupported One Foot in Front: 0  Stand on One Leg: 0  Total: 8             56=Maximum possible score;   0-20=High fall risk  21-40=Moderate fall risk   41-56=Low fall risk      Corcoran Balance Test and G-code impairment scale:  Percentage of Impairment CH     0%    CI     1-19% CJ     20-39% CK     40-59% CL     60-79% CM     80-99% CN      100%   Corcoran   Score 0-56 56 45-55 34-44 23-33 12-22 1-11 0         G codes: In compliance with CMSs Claims Based Outcome Reporting, the following G-code set was chosen for this patient based on their primary functional limitation being treated: The outcome measure chosen to determine the severity of the functional limitation was the [employer] with a score of 8/56 which was correlated with the impairment scale.       · Mobility - Walking and Moving Around:               - CURRENT STATUS:    CM - 80%-99% impaired, limited or restricted               - GOAL STATUS:           CL - 60%-79% impaired, limited or restricted               - D/C STATUS:                       ---------------To be determined---------------       Physical Therapy Evaluation Charge Determination   History Examination Presentation Decision-Making   MEDIUM  Complexity : 1-2 comorbidities / personal factors will impact the outcome/ POC  HIGH Complexity : 4+ Standardized tests and measures addressing body structure, function, activity limitation and / or participation in recreation  MEDIUM Complexity : Evolving with changing characteristics  Other outcome measures Corcoran Balance Test 8/56  HIGH       Based on the above components, the patient evaluation is determined to be of the following complexity level: MEDIUM     Pain:  Pain Scale 1: Numeric (0 - 10)  Pain Intensity 1: 0  Pain Location 1: Head  Pain Description 1: Aching  Pain Intervention(s) 1: Medication (see MAR)  Activity Tolerance:   Good, requested to return to bed due to fatigue but agreeable to sit in chair x 20 minutes with encouragement   Please refer to the flowsheet for vital signs taken during this treatment. After treatment:   [X]     Patient left in no apparent distress sitting up in chair  [ ]     Patient left in no apparent distress in bed  [X]     Call bell left within reach  [X]     Nursing notified  [ ]     Caregiver present  [X]     Chair alarm activated      COMMUNICATION/EDUCATION:   The patients plan of care was discussed with: Registered Nurse and . Patient was educated regarding Her deficit(s) of impaired balance and weakness as this relates to Her diagnosis of MS exacerbation. She demonstrated Good understanding as evidenced by discussion about need for rehab and therapy services. [X]  Fall prevention education was provided and the patient/caregiver indicated understanding. [X]  Patient/family have participated as able in goal setting and plan of care. [X]  Patient/family agree to work toward stated goals and plan of care. [ ]  Patient understands intent and goals of therapy, but is neutral about his/her participation. [ ]  Patient is unable to participate in goal setting and plan of care.      Thank you for this referral.  Britni Pineda, PT, DPT   Time Calculation: 17 mins

## 2017-02-28 PROCEDURE — 74011250636 HC RX REV CODE- 250/636: Performed by: FAMILY MEDICINE

## 2017-02-28 PROCEDURE — 65270000032 HC RM SEMIPRIVATE

## 2017-02-28 PROCEDURE — 74011250637 HC RX REV CODE- 250/637: Performed by: INTERNAL MEDICINE

## 2017-02-28 PROCEDURE — 74011250637 HC RX REV CODE- 250/637: Performed by: NURSE PRACTITIONER

## 2017-02-28 PROCEDURE — 74011250637 HC RX REV CODE- 250/637: Performed by: FAMILY MEDICINE

## 2017-02-28 PROCEDURE — 74011250636 HC RX REV CODE- 250/636: Performed by: INTERNAL MEDICINE

## 2017-02-28 PROCEDURE — 97535 SELF CARE MNGMENT TRAINING: CPT | Performed by: OCCUPATIONAL THERAPIST

## 2017-02-28 PROCEDURE — 97165 OT EVAL LOW COMPLEX 30 MIN: CPT | Performed by: OCCUPATIONAL THERAPIST

## 2017-02-28 PROCEDURE — G8987 SELF CARE CURRENT STATUS: HCPCS | Performed by: OCCUPATIONAL THERAPIST

## 2017-02-28 PROCEDURE — G8988 SELF CARE GOAL STATUS: HCPCS | Performed by: OCCUPATIONAL THERAPIST

## 2017-02-28 RX ORDER — GUAIFENESIN 100 MG/5ML
100 SOLUTION ORAL
Status: DISCONTINUED | OUTPATIENT
Start: 2017-02-28 | End: 2017-03-03 | Stop reason: HOSPADM

## 2017-02-28 RX ORDER — FACIAL-BODY WIPES
10 EACH TOPICAL DAILY PRN
Status: DISCONTINUED | OUTPATIENT
Start: 2017-02-28 | End: 2017-03-03 | Stop reason: HOSPADM

## 2017-02-28 RX ORDER — DOCUSATE SODIUM 100 MG/1
100 CAPSULE, LIQUID FILLED ORAL DAILY
Status: DISCONTINUED | OUTPATIENT
Start: 2017-03-01 | End: 2017-03-03 | Stop reason: HOSPADM

## 2017-02-28 RX ADMIN — DIPHENHYDRAMINE HYDROCHLORIDE 12.5 MG: 50 INJECTION, SOLUTION INTRAMUSCULAR; INTRAVENOUS at 21:26

## 2017-02-28 RX ADMIN — Medication 10 ML: at 16:12

## 2017-02-28 RX ADMIN — SERTRALINE HYDROCHLORIDE 50 MG: 50 TABLET ORAL at 21:26

## 2017-02-28 RX ADMIN — HEPARIN SODIUM 5000 UNITS: 5000 INJECTION, SOLUTION INTRAVENOUS; SUBCUTANEOUS at 06:13

## 2017-02-28 RX ADMIN — TIZANIDINE 4 MG: 4 TABLET ORAL at 21:26

## 2017-02-28 RX ADMIN — Medication 10 ML: at 06:19

## 2017-02-28 RX ADMIN — ALPRAZOLAM 0.25 MG: 0.25 TABLET ORAL at 17:52

## 2017-02-28 RX ADMIN — GUAIFENESIN 100 MG: 100 SOLUTION ORAL at 23:47

## 2017-02-28 RX ADMIN — TIZANIDINE 4 MG: 4 TABLET ORAL at 06:16

## 2017-02-28 RX ADMIN — HEPARIN SODIUM 5000 UNITS: 5000 INJECTION, SOLUTION INTRAVENOUS; SUBCUTANEOUS at 12:19

## 2017-02-28 RX ADMIN — HEPARIN SODIUM 5000 UNITS: 5000 INJECTION, SOLUTION INTRAVENOUS; SUBCUTANEOUS at 19:27

## 2017-02-28 NOTE — PROGRESS NOTES
[facility] has accepted pt for admission once insurance authorization has been received. Informed pt update.   Anival Shine LCSW

## 2017-02-28 NOTE — PROGRESS NOTES
Hospitalist Progress Note            Daily Progress Note: 2017    Assessment/Plan:   1. Multiple sclerosis with possible exacerbation, resulting in a fall - appreciate neurology consult; continue IV Solumedrol; needs PT/OT evaluation finished 3 doses of steroids . 2. Fever - continue to assess for infection as cause of worsening weakness --> UA fairly unremarkable; blood culture  (NGTD); stopped empiric zosyn   3. Mitral valve prolapse               4 zoloft   DISPO: home once better       Subjective:   She is very happy that her weakness got better with steroids Overnight events discussed with nursing. Review of Systems:   No CP, no SOB. Objective:     Visit Vitals    /80 (BP 1 Location: Right arm, BP Patient Position: At rest)    Pulse (!) 55    Temp 97.6 °F (36.4 °C)    Resp 16    Ht 5' 3\" (1.6 m)    Wt 72.6 kg (160 lb)    SpO2 97%    Breastfeeding No    BMI 28.34 kg/m2      O2 Device: Room air    Temp (24hrs), Av.4 °F (36.9 °C), Min:97.6 °F (36.4 °C), Max:98.8 °F (37.1 °C)            07 -  1900  In: 360 [P.O.:360]  Out: 1 [Urine:1]    EXAM:  General: WD, WN. Alert, cooperative, no acute distress    HEENT: NC, atraumatic. PERRL, EOMI. Anicteric sclerae. Neck:   Supple, trachea midline  Lungs:  CTA bilaterally. No Wheezing/Rhonchi/Rales. Heart:  Regular rhythm,  No murmur (), No Rubs, No Gallops  Abdomen: Soft, Non distended, Non tender.  +Bowel sounds, no HSM  Extremities: No c/c/e  Neurologic:  Bilateral LE weakness, 3 plus /5  Psych:   Good insight. Not anxious nor agitated.         Data Review:     Recent Results (from the past 24 hour(s))   CBC WITH AUTOMATED DIFF    Collection Time: 17  2:52 AM   Result Value Ref Range    WBC 6.3 3.6 - 11.0 K/uL    RBC 4.34 3.80 - 5.20 M/uL    HGB 11.6 11.5 - 16.0 g/dL    HCT 36.2 35.0 - 47.0 %    MCV 83.4 80.0 - 99.0 FL    MCH 26.7 26.0 - 34.0 PG    MCHC 32.0 30.0 - 36.5 g/dL    RDW 13.3 11.5 - 14.5 % PLATELET 282 210 - 132 K/uL    NEUTROPHILS 83 (H) 32 - 75 %    LYMPHOCYTES 13 12 - 49 %    MONOCYTES 4 (L) 5 - 13 %    EOSINOPHILS 0 0 - 7 %    BASOPHILS 0 0 - 1 %    ABS. NEUTROPHILS 5.3 1.8 - 8.0 K/UL    ABS. LYMPHOCYTES 0.8 0.8 - 3.5 K/UL    ABS. MONOCYTES 0.3 0.0 - 1.0 K/UL    ABS. EOSINOPHILS 0.0 0.0 - 0.4 K/UL    ABS.  BASOPHILS 0.0 0.0 - 0.1 K/UL   METABOLIC PANEL, BASIC    Collection Time: 02/27/17  2:52 AM   Result Value Ref Range    Sodium 142 136 - 145 mmol/L    Potassium 3.6 3.5 - 5.1 mmol/L    Chloride 104 97 - 108 mmol/L    CO2 30 21 - 32 mmol/L    Anion gap 8 5 - 15 mmol/L    Glucose 176 (H) 65 - 100 mg/dL    BUN 12 6 - 20 MG/DL    Creatinine 0.65 0.55 - 1.02 MG/DL    BUN/Creatinine ratio 18 12 - 20      GFR est AA >60 >60 ml/min/1.73m2    GFR est non-AA >60 >60 ml/min/1.73m2    Calcium 8.0 (L) 8.5 - 10.1 MG/DL       Principal Problem:    Multiple sclerosis exacerbation (HCC) (2/24/2017)        Medications reviewed  Current Facility-Administered Medications   Medication Dose Route Frequency    ALPRAZolam (XANAX) tablet 0.25 mg  0.25 mg Oral DAILY PRN    acetaminophen (TYLENOL) tablet 650 mg  650 mg Oral Q4H PRN    sodium chloride (NS) flush 5-10 mL  5-10 mL IntraVENous Q8H    sodium chloride (NS) flush 5-10 mL  5-10 mL IntraVENous PRN    heparin (porcine) injection 5,000 Units  5,000 Units SubCUTAneous Q8H    tiZANidine (ZANAFLEX) tablet 4 mg  4 mg Oral Q8H PRN    diphenhydrAMINE (BENADRYL) injection 12.5 mg  12.5 mg IntraVENous Q6H PRN    sertraline (ZOLOFT) tablet 50 mg  50 mg Oral DAILY       DVT prophylaxis: SC heparin    Total time spent with patient: 10 minutes    Bryson Zayas MD

## 2017-02-28 NOTE — PROGRESS NOTES
Problem: Self Care Deficits Care Plan (Adult)  Goal: *Acute Goals and Plan of Care (Insert Text)  Occupational Therapy Goals  Initiated 2/28/2017  1. Patient will perform grooming tasks standing at sink level with min A for steadying within 7 day(s). 2. Patient will perform lower body dressing tasks with set-up from edge of bed, standing to complete with CGA within 7 day(s). 3. Patient will perform sponge bath with setup from seated position, CGA when standing to complete within 7 day(s). 4. Patient will perform walk-in toilet transfers with CGA using DME prn within 7 day(s). 5. Patient will perform all aspects of toileting with mod I using grab bar prn within 7 day(s). OCCUPATIONAL THERAPY EVALUATION  Patient: Gely Bernard (62 y.o. female)  Date: 2/28/2017  Primary Diagnosis: Multiple sclerosis exacerbation (HCC)        Precautions: fall, skin,          ASSESSMENT :  Based on the objective data described below, the patient presents with decreased strength (LB>UB and RLE>LLE), decreased balance, decreased coordination,  and ataxic mobility impacting basic self care and functional mobility. Pt is a good I/P rehab candidate, demonstrating ability to tolerate 3 hours of therapy/day. Recommend I/P rehab so patient can progress back home alone to an independent and safe functional status. Patient will benefit from skilled intervention to address the above impairments.   Patients rehabilitation potential is considered to be good  Factors which may influence rehabilitation potential include:   [ ]             None noted  [ ]             Mental ability/status  [X]             Medical condition  [ ]             Home/family situation and support systems  [ ]             Safety awareness  [ ]             Pain tolerance/management  [ ]             Other:        PLAN :  Recommendations and Planned Interventions:  [X]               Self Care Training                  [X]        Therapeutic Activities  [X] Functional Mobility Training    [ ]        Cognitive Retraining  [X]               Therapeutic Exercises           [X]        Endurance Activities  [X]               Balance Training                   [X]        Neuromuscular Re-Education  [ ]               Visual/Perceptual Training     [X]   Home Safety Training  [X]               Patient Education                 [X]        Family Training/Education  [ ]               Other (comment):     Frequency/Duration: Patient will be followed by occupational therapy 5x/week to address goals. Discharge Recommendations: I/P rehab  Further Equipment Recommendations for Discharge: TBD       SUBJECTIVE:   Patient stated that she would like to return to bed after OT eval as she slept poorly last evening. OBJECTIVE DATA SUMMARY:   HISTORY:   Past Medical History:   Diagnosis Date    Neurological disorder       Multiple sclerosis, in remission 2011    Other ill-defined conditions(479.89)       Mitral Value Prolapse     Past Surgical History:   Procedure Laterality Date    ABDOMEN SURGERY PROC UNLISTED         hernia repair    HX HYSTERECTOMY            Prior Level of Function/Home Situation: independent with all ADLs and basic IADLs - received meals on wheels but could perform simple meal prep as desired - was independent with tub transfers up until approx 3 weeks ago - reverted to sponge baths at that time secondary to weakness for safety reasons.   Home Situation  Home Environment: Apartment  # Steps to Enter: 5  Rails to Enter: Yes  Hand Rails : Bilateral  One/Two Story Residence: One story  Living Alone: Yes  Support Systems: Family member(s)  Patient Expects to be Discharged to[de-identified] Apartment  Current DME Used/Available at Home: Grab bars, Cane, straight, Walker, rolling (has w/c from her apt complex)  Tub or Shower Type: Tub/Shower combination  [X]  Right hand dominant             [ ]  Left hand dominant     EXAMINATION OF PERFORMANCE DEFICITS:  Cognitive/Behavioral Status:  Neurologic State: Alert  Orientation Level: Oriented X4  Cognition: Appropriate safety awareness; Appropriate for age attention/concentration; Follows commands  Perception: Appears intact (with glasses)  Perseveration: No perseveration noted  Safety/Judgement: Awareness of environment;Good awareness of safety precautions; Home safety; Insight into deficits  Skin: intact  Edema: none  Vision/Perceptual:            No acute changes per pt                    Corrective Lenses: Glasses (notably broken)  Range of Motion:  AROM: Generally decreased, functional                       Strength:  Strength: Generally decreased, functional              Coordination:  Coordination: Generally decreased, functional (left UE dysmetria, mild)  Fine Motor Skills-Upper: Left Intact; Right Intact    Gross Motor Skills-Upper: Left Intact; Right Intact  Tone & Sensation:  Tone: Normal  Sensation:  (intact to touch localization; pt reports decreased sensation)                       Balance:  Sitting: Intact; Without support  Standing: Impaired; With support  Standing - Static: Fair  Standing - Dynamic : Fair     Functional Mobility and Transfers for ADLs:  Bed Mobility:  Supine to Sit: Contact guard assistance  Sit to Supine: Contact guard assistance  Scooting: Contact guard assistance     Transfers:  Sit to Stand: Contact guard assistance  Stand to Sit: Contact guard assistance (and cues for safety)  Bed to Chair: Moderate assistance (without device)  Toilet Transfer : Moderate assistance (inferred - stand-pivotal stepping without device)     ADL Assessment:  Feeding: Independent     Oral Facial Hygiene/Grooming: Independent     Bathing: Moderate assistance (nursing assisting with LB)     Upper Body Dressing: Supervision;Setup     Lower Body Dressing: Setup/CGA seated - mod A for steadying when standing to pull up/down     Toileting:  Moderate assistance (for steadying when standing for clothing mgmt)                 ADL Intervention and task modifications:   Pt received in bed - performed bed mobility, donned/doffed pants, socks and shoes from edge of bed, performed lateral steps along bed and transfer to/from bedside chair (see above for assist required). Pt educated about the I/P rehab process and general plan of care - stated understanding and willingness to participate. Functional Measure:  Barthel Index:      Bathin  Bladder: 5  Bowels: 10  Groomin  Dressin  Feeding: 10  Mobility: 0  Stairs: 0  Toilet Use: 5  Transfer (Bed to Chair and Back): 10  Total: 50         Barthel and G-code impairment scale:  Percentage of impairment CH  0% CI  1-19% CJ  20-39% CK  40-59% CL  60-79% CM  80-99% CN  100%   Barthel Score 0-100 100 99-80 79-60 59-40 20-39 1-19    0   Barthel Score 0-20 20 17-19 13-16 9-12 5-8 1-4 0      The Barthel ADL Index: Guidelines  1. The index should be used as a record of what a patient does, not as a record of what a patient could do. 2. The main aim is to establish degree of independence from any help, physical or verbal, however minor and for whatever reason. 3. The need for supervision renders the patient not independent. 4. A patient's performance should be established using the best available evidence. Asking the patient, friends/relatives and nurses are the usual sources, but direct observation and common sense are also important. However direct testing is not needed. 5. Usually the patient's performance over the preceding 24-48 hours is important, but occasionally longer periods will be relevant. 6. Middle categories imply that the patient supplies over 50 per cent of the effort. 7. Use of aids to be independent is allowed. Cale Yanes., Barthel, DLeeleeW. (7180). Functional evaluation: the Barthel Index. [address] [facility] (14)2. Stepan Young, NEHAL, Meraz Art., [place].Di, [address] ().  Measuring the change indisability after inpatient rehabilitation; comparison of the responsiveness of the Barthel Index and Functional [place] Measure. Journal of Neurology, Neurosurgery, and Psychiatry, 66(4), 045-118. CHRISTIANO Shepherd.A, DONNIE Mars, & Jaimie Campos M.A. (2004.) Assessment of post-stroke quality of life in cost-effectiveness studies: The usefulness of the Barthel Index and the EuroQoL-5D. Quality of Life Research, 13, 203-13         G codes: In compliance with CMSs Claims Based Outcome Reporting, the following G-code set was chosen for this patient based on their primary functional limitation being treated: The outcome measure chosen to determine the severity of the functional limitation was the Barthel with a score of 50/100 which was correlated with the impairment scale. · Self Care:               - CURRENT STATUS:    CK - 40%-59% impaired, limited or restricted               - GOAL STATUS:           CJ - 20%-39% impaired, limited or restricted               - D/C STATUS:                       ---------------To be determined---------------      Occupational Therapy Evaluation Charge Determination   History Examination Decision-Making   LOW Complexity : Brief history review  LOW Complexity : 1-3 performance deficits relating to physical, cognitive , or psychosocial skils that result in activity limitations and / or participation restrictions  MEDIUM Complexity : Patient may present with comorbidities that affect occupational performnce. Miniml to moderate modification of tasks or assistance (eg, physical or verbal ) with assesment(s) is necessary to enable patient to complete evaluation       Based on the above components, the patient evaluation is determined to be of the following complexity level: LOW   Pain:  Pain Scale 1: Numeric (0 - 10)  Pain Intensity 1: 0              Activity Tolerance:   Requested return to bed after OT evaluation secondary to poor sleep overnight.  Tolerated tasks performed with stable vitals/no signs/symptoms of distress other than generalized fatigue. Please refer to the flowsheet for vital signs taken during this treatment. After treatment:   [ ] Patient left in no apparent distress sitting up in chair  [X] Patient left in no apparent distress in bed  [X] Call bell left within reach  [X] Nursing notified  [ ] Caregiver present  [ ] Bed alarm activated      COMMUNICATION/EDUCATION:   The patients plan of care was discussed with: RN, PT, . [ ] Home safety education was provided and the patient/caregiver indicated understanding. [X] Patient/family have participated as able in goal setting and plan of care. [X] Patient/family agree to work toward stated goals and plan of care. [ ] Patient understands intent and goals of therapy, but is neutral about his/her participation. [ ] Patient is unable to participate in goal setting and plan of care. This patients plan of care is appropriate for delegation to [facility].      Thank you for this referral.  Carter Reddy V, OTR/L

## 2017-02-28 NOTE — ADT AUTH CERT NOTES
Utilization Review           Neurology [address] Day 4 (2/27/2017) by Francisco Mortensen RN        Review Status Review Entered       Completed 2/28/2017       Details              Care Day: 4 Care Date: 2/27/2017 Level of Care: Inpatient Floor       Guideline Day 3        Level Of Care       (X) * Activity level acceptable       ( ) * Complete discharge planning              Clinical Status       (X) * No infection, or status acceptable       (X) * Isolation not needed, or status acceptable       (X) * Respiratory status acceptable       (X) * Pain and nausea absent or adequately managed       (X) * Ventilatory status acceptable       (X) * Neurologic problems absent or stabilized       (X) * Muscle or nerve damage absent or stable       ( ) * General Discharge Criteria met              Interventions       (X) * Intake acceptable       ( ) * No inpatient interventions needed       2/28/2017 9:04 AM EST by Ruy Tejada         heparin 5000u sq q8h. ..iv benadryl 12.5mg x1. ..iv medrol 1000mg x1                     2/28/2017 9:04 AM EST by Ruy Tejada       Subject: Additional Clinical Information       2-27-17: clinical review:VS: 98.4, 113/71, 55, 16very happy that her weakness has gotten better w/ steroidsneeds pt/ot evaluation finished 3 doses steroidsper care mgt notes, made referral [facility] due to location per PTAwait OT evaluation and decision from rehab.   They will need to get insurance authorization if accepted by rehabper neurology notes on 26th: suspect pseudo exac vs. occult cortical and subcortical inflammation continue solumedrol for 3 days given significant response                                   * Milestone                  Neurology [address] Day 2 (2/25/2017) by Francisco Mortensen RN        Review Status Review Entered       Completed 2/28/2017       Details              Care Day: 2 Care Date: 2/25/2017 Level of Care:        Guideline Day 2        Clinical Status       (X) * No ICU or intermediate care needs              Interventions       (X) Inpatient interventions continue       2/28/2017 8:59 AM EST by Yossi Yu         iv enadryl 12.5mg x1, heparin 5000u sq q8h. ..iv medrol 1000mg now dose                     2/28/2017 8:59 AM EST by Yossi Yu       Subject: Additional Clinical Information       2-25-17: clinical review:VS: 100.3, 109/73, 97, 18Labs: wbc 4.4, h/h 10.9/34.5, k 3.4, gluc 102per neurology: established   diagnosis of multiple sclerosis who had been doing well and was at   baseline until a few days ago when she developed weakness in both   lower extremities. She has baseline spasticity in the right leg, it felt   worse and also the left leg felt weak, which is new for her. I suspect a   new lesion related to multiple sclerosis, possibly in the spinal cord. We   will check MRI of the brain and cervical spine and will initiate Solu-  Medrol. If the MRI does not show any acute lesions, we may not need   to continue the Solu-Medrol. We will have physical therapy evaluate   it. She is in the process of starting immunomodulating therapy for the   long-term as outpatient and we will continue to followup in this regard   as outpatient. MRI brain:  Stable supra/infratentorial demyelinating disease burden. Multiple areas of  confluent and oval/linear T2 hyperintense signal throughout the periventricular  white matter as well as the angely, consistent with the provided history of  multiple sclerosis. No abnormal enhancement  MRI cervical spine: Stable pontine and cervical cord demyelinating disease burden as detailed  above compatible with the patient's diagnosis of multiple sclerosis. There are  no enhancing lesions  2.  Multilevel degenerative change detailed by level above most significant at           [id number] sclerosis with possible exacerbation, resulting in a fall - appreciate neurology consult; continue IV Solumedrol; needs PT/OT evaluation           Fever - continue to assess for infection as cause of worsening weakness --> UA fairly unremarkable; blood culture collected and pending; continue empiric Zosyn for now, then streamline based on culture results             Mitral valve prolapse                                                                                    * Milestone                  Neurology [address] [place] [address] - Care Day 1 (2/24/2017) by Lynn Michelle RN        Review Status Review Entered       Completed 2/28/2017       Details              Care Day: 1 Care Date: 2/24/2017 Level of Care:        Guideline Day 1        Clinical Status       (X) * Clinical Indications met [F]              Interventions       (X) Inpatient interventions as needed       2/28/2017 8:53 AM EST by CitizenShipperia   Ziliko. ..regular diet. ...consult neurology. ..ambulate w/ assist q8h. ..heparin 5000u sq q8h                                          * Milestone                  Neurology [address] [place] [address] - Clinical Indications for Admission to Inpatient Care by Lynn Michelle RN        Review Status Review Entered       Completed 2/28/2017       Details              Clinical Indications for Admission to Inpatient Care       Most Recent : Katelin Downing Most Recent Date: 2/28/2017 8:51 AM EST       (X) Hospital admission is needed for appropriate care of the patient because of 1 or more of the        following :          (X) Neurologic condition, symptom, or finding for which emergency and observation care have failed           or are not considered appropriate.  See General Criteria: Observation Care, General Admission           Criteria, or Pediatric General Admission Criteria guideline as appropriate.          2/28/2017 8:51 AM EST by Katelin Downing            Multiple Sclerosis ---per neurology consult on 25th: I suspect a new lesion related to multiple sclerosis, possibly in the spinal cord

## 2017-02-28 NOTE — DISCHARGE SUMMARY
Discharge Summary       PATIENT ID: Ruth Collado  MRN: [id number]   YOB: 1955    DATE OF ADMISSION: 2/24/2017  1:31 PM    DATE OF DISCHARGE: 2/28/17    PRIMARY CARE PROVIDER: Rubin Douglas MD     ATTENDING PHYSICIAN: Noam Lopez   DISCHARGING PROVIDER: Roseline Marley MD    To contact this individual call [phone] and ask the  to page. If unavailable ask to be transferred the Adult Hospitalist Department. CONSULTATIONS: IP CONSULT TO NEUROLOGY    PROCEDURES/SURGERIES: * No surgery found *    ADMITTING DIAGNOSES & HOSPITAL COURSE:   The patient is a 1year-old female   with a past medical history of multiple sclerosis that was diagnosed in   [facility] after she developed optic neuritis. She had been following up   with Dr. James Rudd in the past and saw Dr. Antonio Hartmann in December of   last year. She states that she has done quite well over the years and   had been fairly functional, except for some right lower extremity   weakness and spasticity. Her last MRI of the brain in December was   stable as per records. She used to be on Avonex, which she took for   many years and then stopped taking it in 2005 and there was a plan to   restart it, but she has not been able to do that as yet. Botulinum toxin   injections were also considered for the right leg spasticity, but she   could not do it because of financial reasons. Over the past couple of   days, she noticed that her left leg was starting to feel weak. The right   leg was also feeling weaker than usual. She fell last night and was   unable to get up. She was brought into the hospital for further workup   and treatment. She denies any symptoms in the upper extremities. No   changes in vision, speech or swallowing ability. She reports some pain   in the left arm. She has also had urinary frequency.       1. Multiple sclerosis with possible exacerbation, resulting in a fall - appreciate neurology consult; continue IV Solumedrol; needs PT/OT evaluation finished 3 doses of steroids . 2. Fever - continue to assess for infection as cause of worsening weakness --> UA fairly unremarkable; blood culture (NGTD); stopped empiric zosyn   3. Mitral valve prolapse     4 zoloft   5 patient exposed to Flu A positive room partner : started on tamiflu prophylaxis   patient nauseated with it but improving ,. Finishing 5 days course . She had flu shot in past        [address] / PLAN:      Needs PT for lower extremity weakness        PENDING TEST RESULTS:   At the time of discharge the following test results are still pending:     FOLLOW UP APPOINTMENTS:    Follow-up Information     Follow up With Details Comments 130 Ed Colvin MD In 1 week  [address]      [address] [place] [address], DO  As needed [facility] Neurology  [address] [facility]  [phone]             ADDITIONAL CARE RECOMMENDATIONS:   Please follow up PCP     DIET: Regular Diet      ACTIVITY: Activity as tolerated    WOUND CARE:     EQUIPMENT needed:       DISCHARGE MEDICATIONS:  Current Discharge Medication List      START taking these medications    Details   bisacodyl (DULCOLAX) 10 mg suppository Insert 10 mg into rectum daily as needed. Qty: 1 Each, Refills: 0      docusate sodium (COLACE) 100 mg capsule Take 1 Cap by mouth daily for 90 days. Qty: 90 Cap, Refills: 0      oseltamivir (TAMIFLU) 75 mg capsule Take 1 Cap by mouth daily for 5 days. Qty: 5 Cap, Refills: 0         CONTINUE these medications which have NOT CHANGED    Details   sertraline (ZOLOFT) 50 mg tablet Take 50 mg by mouth daily. tiZANidine (ZANAFLEX) 4 mg tablet Take 1 Tab by mouth every eight (8) hours as needed. Qty: 60 Tab, Refills: 0               NOTIFY YOUR PHYSICIAN FOR ANY OF THE FOLLOWING:   Fever over 101 degrees for 24 hours.    Chest pain, shortness of breath, fever, chills, nausea, vomiting, diarrhea, change in mentation, falling, weakness, bleeding. Severe pain or pain not relieved by medications. Or, any other signs or symptoms that you may have questions about.     DISPOSITION:    Home With:   OT  PT  HH  RN      x Long term SNF/Inpatient Rehab    Independent/assisted living    Hospice    Other:       PATIENT CONDITION AT DISCHARGE:     Functional status    Poor    x Deconditioned     Independent      Cognition    x Lucid     Forgetful     Dementia      Catheters/lines (plus indication)    Leon     PICC     PEG    x None      Code status   x  Full code     DNR      PHYSICAL EXAMINATION AT DISCHARGE:   Refer to Progress Note      CHRONIC MEDICAL DIAGNOSES:  Problem List as of 3/3/2017  Date Reviewed: 2/24/2017          Codes Class Noted - Resolved    * (Principal)Multiple sclerosis exacerbation ([facility] [address] ICD-10-CM: G35  ICD-9-CM: 300  2/24/2017 - Present              Greater than 20  minutes were spent with the patient on counseling and coordination of care    Signed:   Irlanda Byrne MD  3/3/2017  8:26 AM

## 2017-03-01 LAB
BACTERIA SPEC CULT: NORMAL
SERVICE CMNT-IMP: NORMAL

## 2017-03-01 PROCEDURE — 74011250637 HC RX REV CODE- 250/637: Performed by: FAMILY MEDICINE

## 2017-03-01 PROCEDURE — 74011250636 HC RX REV CODE- 250/636: Performed by: INTERNAL MEDICINE

## 2017-03-01 PROCEDURE — 74011250637 HC RX REV CODE- 250/637: Performed by: NURSE PRACTITIONER

## 2017-03-01 PROCEDURE — 74011250637 HC RX REV CODE- 250/637: Performed by: INTERNAL MEDICINE

## 2017-03-01 PROCEDURE — 74011250636 HC RX REV CODE- 250/636: Performed by: FAMILY MEDICINE

## 2017-03-01 PROCEDURE — 65270000032 HC RM SEMIPRIVATE

## 2017-03-01 RX ORDER — ONDANSETRON 2 MG/ML
4 INJECTION INTRAMUSCULAR; INTRAVENOUS
Status: COMPLETED | OUTPATIENT
Start: 2017-03-01 | End: 2017-03-01

## 2017-03-01 RX ORDER — DOCUSATE SODIUM 100 MG/1
100 CAPSULE, LIQUID FILLED ORAL DAILY
Qty: 90 CAP | Refills: 0 | Status: SHIPPED | OUTPATIENT
Start: 2017-03-01 | End: 2017-05-30

## 2017-03-01 RX ORDER — OSELTAMIVIR PHOSPHATE 75 MG/1
75 CAPSULE ORAL EVERY 24 HOURS
Status: DISCONTINUED | OUTPATIENT
Start: 2017-03-02 | End: 2017-03-03 | Stop reason: HOSPADM

## 2017-03-01 RX ORDER — FACIAL-BODY WIPES
10 EACH TOPICAL
Qty: 1 EACH | Refills: 0 | Status: SHIPPED | OUTPATIENT
Start: 2017-03-01 | End: 2021-08-16 | Stop reason: SDUPTHER

## 2017-03-01 RX ORDER — OSELTAMIVIR PHOSPHATE 75 MG/1
75 CAPSULE ORAL EVERY 24 HOURS
Status: DISCONTINUED | OUTPATIENT
Start: 2017-03-01 | End: 2017-03-01

## 2017-03-01 RX ORDER — OSELTAMIVIR PHOSPHATE 6 MG/ML
75 FOR SUSPENSION ORAL EVERY 24 HOURS
Status: DISCONTINUED | OUTPATIENT
Start: 2017-03-01 | End: 2017-03-01

## 2017-03-01 RX ORDER — OSELTAMIVIR PHOSPHATE 75 MG/1
75 CAPSULE ORAL DAILY
Qty: 5 CAP | Refills: 0 | Status: SHIPPED | OUTPATIENT
Start: 2017-03-01 | End: 2017-03-03

## 2017-03-01 RX ADMIN — DIPHENHYDRAMINE HYDROCHLORIDE 12.5 MG: 50 INJECTION, SOLUTION INTRAMUSCULAR; INTRAVENOUS at 21:51

## 2017-03-01 RX ADMIN — Medication 10 ML: at 14:31

## 2017-03-01 RX ADMIN — BISACODYL 10 MG: 10 SUPPOSITORY RECTAL at 21:58

## 2017-03-01 RX ADMIN — Medication 10 ML: at 21:51

## 2017-03-01 RX ADMIN — TIZANIDINE 4 MG: 4 TABLET ORAL at 08:38

## 2017-03-01 RX ADMIN — HEPARIN SODIUM 5000 UNITS: 5000 INJECTION, SOLUTION INTRAVENOUS; SUBCUTANEOUS at 13:02

## 2017-03-01 RX ADMIN — Medication 10 ML: at 07:01

## 2017-03-01 RX ADMIN — SERTRALINE HYDROCHLORIDE 50 MG: 50 TABLET ORAL at 21:51

## 2017-03-01 RX ADMIN — OSELTAMIVIR PHOSPHATE 75 MG: 6 POWDER, FOR SUSPENSION ORAL at 02:54

## 2017-03-01 RX ADMIN — DOCUSATE SODIUM 100 MG: 100 CAPSULE, LIQUID FILLED ORAL at 08:38

## 2017-03-01 RX ADMIN — TIZANIDINE 4 MG: 4 TABLET ORAL at 21:51

## 2017-03-01 RX ADMIN — ACETAMINOPHEN 650 MG: 325 TABLET, FILM COATED ORAL at 19:48

## 2017-03-01 RX ADMIN — ONDANSETRON 4 MG: 2 INJECTION INTRAMUSCULAR; INTRAVENOUS at 13:03

## 2017-03-01 RX ADMIN — HEPARIN SODIUM 5000 UNITS: 5000 INJECTION, SOLUTION INTRAVENOUS; SUBCUTANEOUS at 04:00

## 2017-03-01 RX ADMIN — HEPARIN SODIUM 5000 UNITS: 5000 INJECTION, SOLUTION INTRAVENOUS; SUBCUTANEOUS at 19:04

## 2017-03-01 NOTE — PROGRESS NOTES
Bedside and Verbal shift change report given to [facility] SUAD RN (oncoming nurse) by [place] RN (offgoing nurse). Report included the following information SBAR, Kardex, Intake/Output, Recent Results and Med Rec Status.

## 2017-03-01 NOTE — PROGRESS NOTES
[facility] has denied authorization to inpt rehab. Hospitalist to request peer to peer conversation with hopes of overturning decision. Continue to follow. Marino Lema LCSW    Await peer to peer but sent referral to [facility] today in case appeal is upheld. SNF has accepted pt but will not begin auth until final decision is received for inpt rehab.   Marino Lema LCSW

## 2017-03-01 NOTE — PROGRESS NOTES
Bedside and Verbal shift change report given to Crystal (oncoming nurse) by Mich Armando (offgoing nurse). Report included the following information SBAR and Kardex.

## 2017-03-01 NOTE — PROGRESS NOTES
Bedside and Verbal shift change report given to [facility] (oncoming nurse) by Yazan Jung (offgoing nurse). Report included the following information SBAR, Kardex, MAR, Accordion and Recent Results.

## 2017-03-02 PROCEDURE — 74011250636 HC RX REV CODE- 250/636: Performed by: FAMILY MEDICINE

## 2017-03-02 PROCEDURE — 74011250636 HC RX REV CODE- 250/636: Performed by: INTERNAL MEDICINE

## 2017-03-02 PROCEDURE — 74011250637 HC RX REV CODE- 250/637: Performed by: FAMILY MEDICINE

## 2017-03-02 PROCEDURE — 74011250637 HC RX REV CODE- 250/637: Performed by: INTERNAL MEDICINE

## 2017-03-02 PROCEDURE — 65270000032 HC RM SEMIPRIVATE

## 2017-03-02 PROCEDURE — 74011250637 HC RX REV CODE- 250/637: Performed by: NURSE PRACTITIONER

## 2017-03-02 RX ORDER — PANTOPRAZOLE SODIUM 40 MG/1
40 TABLET, DELAYED RELEASE ORAL DAILY
Status: DISCONTINUED | OUTPATIENT
Start: 2017-03-02 | End: 2017-03-03 | Stop reason: HOSPADM

## 2017-03-02 RX ADMIN — DOCUSATE SODIUM 100 MG: 100 CAPSULE, LIQUID FILLED ORAL at 08:57

## 2017-03-02 RX ADMIN — PANTOPRAZOLE SODIUM 40 MG: 40 TABLET, DELAYED RELEASE ORAL at 11:04

## 2017-03-02 RX ADMIN — Medication 10 ML: at 16:56

## 2017-03-02 RX ADMIN — ACETAMINOPHEN 650 MG: 325 TABLET, FILM COATED ORAL at 02:24

## 2017-03-02 RX ADMIN — OSELTAMIVIR PHOSPHATE 75 MG: 75 CAPSULE ORAL at 08:57

## 2017-03-02 RX ADMIN — HEPARIN SODIUM 5000 UNITS: 5000 INJECTION, SOLUTION INTRAVENOUS; SUBCUTANEOUS at 11:04

## 2017-03-02 RX ADMIN — TIZANIDINE 4 MG: 4 TABLET ORAL at 23:45

## 2017-03-02 RX ADMIN — HEPARIN SODIUM 5000 UNITS: 5000 INJECTION, SOLUTION INTRAVENOUS; SUBCUTANEOUS at 19:18

## 2017-03-02 RX ADMIN — SERTRALINE HYDROCHLORIDE 50 MG: 50 TABLET ORAL at 22:36

## 2017-03-02 RX ADMIN — HEPARIN SODIUM 5000 UNITS: 5000 INJECTION, SOLUTION INTRAVENOUS; SUBCUTANEOUS at 05:05

## 2017-03-02 RX ADMIN — Medication 10 ML: at 22:36

## 2017-03-02 RX ADMIN — BISACODYL 10 MG: 10 SUPPOSITORY RECTAL at 17:58

## 2017-03-02 RX ADMIN — DIPHENHYDRAMINE HYDROCHLORIDE 12.5 MG: 50 INJECTION, SOLUTION INTRAMUSCULAR; INTRAVENOUS at 22:35

## 2017-03-02 RX ADMIN — Medication 10 ML: at 08:59

## 2017-03-02 RX ADMIN — ALPRAZOLAM 0.25 MG: 0.25 TABLET ORAL at 10:42

## 2017-03-02 NOTE — PROGRESS NOTES
Hospitalist Progress Note         Barbara Schuster MD     Call physician on-call through the  7pm-7am    Daily Progress Note: 3/2/2017    Admission summary and hospital course  The patient is a 1year-old female   with a past medical history of multiple sclerosis that was diagnosed in   [address] [facility] after she developed optic neuritis. She had been following up   with Dr. Jewel Monahan in the past and saw Dr. Ean Chiang in December of   last year. She states that she has done quite well over the years and   had been fairly functional, except for some right lower extremity   weakness and spasticity. Her last MRI of the brain in December was   stable as per records. She used to be on Avonex, which she took for   many years and then stopped taking it in 2005 and there was a plan to   restart it, but she has not been able to do that as yet. Botulinum toxin   injections were also considered for the right leg spasticity, but she   could not do it because of financial reasons. Over the past couple of   days, she noticed that her left leg was starting to feel weak. The right   leg was also feeling weaker than usual. She fell last night and was   unable to get up. She was brought into the hospital for further workup   and treatment. She denies any symptoms in the upper extremities. No   changes in vision, speech or swallowing ability. She reports some pain   in the left arm. She has also had urinary frequency.       Assessment/Plan:     1. Multiple sclerosis with possible exacerbation, resulting in a fall - appreciate neurology consult; continue IV Solumedrol; needs PT/OT evaluation finished 3 doses of steroids . 2. Fever - continue to assess for infection as cause of worsening weakness --> UA fairly unremarkable; blood culture (NGTD); stopped empiric zosyn   3.  Mitral valve prolapse      4 zoloft   VTE prophylaxis: SCD/lovenox  Discussed plan of care with Patient/Family and Nurse   Pre-admission lived at   Discharge planning:PT/OT          Subjective:   Nauseated with tamiflu    Review of Systems:   A comprehensive review of systems was negative except mentioned in A/P    Objective:   Physical Exam:     Visit Vitals    BP 91/59 (BP 1 Location: Right arm, BP Patient Position: At rest)    Pulse 74    Temp 96.5 °F (35.8 °C)    Resp 18    Ht 5' 3\" (1.6 m)    Wt 72.6 kg (160 lb)    SpO2 97%    Breastfeeding No    BMI 28.34 kg/m2      O2 Device: Room air    Temp (24hrs), Av.9 °F (36.6 °C), Min:96.5 °F (35.8 °C), Max:99 °F (37.2 °C)    701 -  1900  In: 240 [P.O.:240]  Out: -    1901 -  0700  In: 830 [P.O.:830]  Out: -     General:  Alert, cooperative, no distress, appears stated age. Lungs:   Clear to auscultation bilaterally. Chest wall:  No tenderness or deformity. Heart:  Regular rate and rhythm, S1, S2 normal, no murmur, click, rub or gallop. Abdomen:   Soft, non-tender. Bowel sounds normal. No masses,  No organomegaly. Extremities: Extremities   Pulses: 2+ and symmetric all extremities. Skin: Skin color, texture, turgor normal. No rashes or lesions   Neurologic: Right LE 3/5 , left 5/5     Data Review:       Recent Days:  No results for input(s): WBC, HGB, HCT, PLT, HGBEXT, HCTEXT, PLTEXT in the last 72 hours. No results for input(s): NA, K, CL, CO2, GLU, BUN, CREA, CA, MG, PHOS, ALB, TBIL, SGOT, ALT, INR in the last 72 hours. No lab exists for component: INREXT  No results for input(s): PH, PCO2, PO2, HCO3, FIO2 in the last 72 hours. 24 Hour Results:  No results found for this or any previous visit (from the past 24 hour(s)).     Problem List:  Problem List as of 3/2/2017  Date Reviewed: 2017          Codes Class Noted - Resolved    * (Principal)Multiple sclerosis exacerbation ([address] ICD-10-CM: G35  ICD-9-CM: 340  2017 - Present              Medications reviewed  Current Facility-Administered Medications   Medication Dose Route Frequency    oseltamivir (TAMIFLU) capsule 75 mg  75 mg Oral Q24H    guaiFENesin (ROBITUSSIN) 100 mg/5 mL oral liquid 100 mg  100 mg Oral Q4H PRN    docusate sodium (COLACE) capsule 100 mg  100 mg Oral DAILY    bisacodyl (DULCOLAX) suppository 10 mg  10 mg Rectal DAILY PRN    ALPRAZolam (XANAX) tablet 0.25 mg  0.25 mg Oral DAILY PRN    acetaminophen (TYLENOL) tablet 650 mg  650 mg Oral Q4H PRN    sodium chloride (NS) flush 5-10 mL  5-10 mL IntraVENous Q8H    sodium chloride (NS) flush 5-10 mL  5-10 mL IntraVENous PRN    heparin (porcine) injection 5,000 Units  5,000 Units SubCUTAneous Q8H    tiZANidine (ZANAFLEX) tablet 4 mg  4 mg Oral Q8H PRN    diphenhydrAMINE (BENADRYL) injection 12.5 mg  12.5 mg IntraVENous Q6H PRN    sertraline (ZOLOFT) tablet 50 mg  50 mg Oral DAILY       Care Plan discussed with: Nurse    Total time spent with patient: 30 minutes.     Ryanne Mejia MD

## 2017-03-02 NOTE — PROGRESS NOTES
Bedside and Verbal shift change report given to [facility] RN  (oncoming nurse) by Ramona Alcala (offgoing nurse). Report included the following information SBAR, Kardex, Intake/Output and Med Rec Status.

## 2017-03-02 NOTE — PROGRESS NOTES
Peer to Peer still denied Inpatient Rehab. CM sent updates to [facility] and asked them to start authorization.    Desi Orozco, BSW, ACM

## 2017-03-02 NOTE — PROGRESS NOTES
Hospitalist Progress Note         Ryanne Mejia MD     Call physician on-call through the  7pm-7am    Daily Progress Note: 3/2/2017    Admission summary and hospital course  The patient is a 1year-old female   with a past medical history of multiple sclerosis that was diagnosed in   [address] [facility] after she developed optic neuritis. She had been following up   with Dr. Elvin Luo in the past and saw Dr. Long Davis in December of   last year. She states that she has done quite well over the years and   had been fairly functional, except for some right lower extremity   weakness and spasticity. Her last MRI of the brain in December was   stable as per records. She used to be on Avonex, which she took for   many years and then stopped taking it in 2005 and there was a plan to   restart it, but she has not been able to do that as yet. Botulinum toxin   injections were also considered for the right leg spasticity, but she   could not do it because of financial reasons. Over the past couple of   days, she noticed that her left leg was starting to feel weak. The right   leg was also feeling weaker than usual. She fell last night and was   unable to get up. She was brought into the hospital for further workup   and treatment. She denies any symptoms in the upper extremities. No   changes in vision, speech or swallowing ability. She reports some pain   in the left arm. She has also had urinary frequency.       Assessment/Plan:     1. Multiple sclerosis with possible exacerbation, resulting in a fall - appreciate neurology consult; continue IV Solumedrol; needs PT/OT evaluation finished 3 doses of steroids . 2. Fever - continue to assess for infection as cause of worsening weakness --> UA fairly unremarkable; blood culture (NGTD); stopped empiric zosyn   3.  Mitral valve prolapse      4 zoloft     For placement       VTE prophylaxis: SCD/lovenox  Discussed plan of care with Patient/Family and Nurse Pre-admission lived at   Discharge planning:PT/OT          Subjective:   Better today , some GERD but not nauseated anymore     Review of Systems:   A comprehensive review of systems was negative except mentioned in A/P    Objective:   Physical Exam:     Visit Vitals    /63 (BP 1 Location: Right arm, BP Patient Position: At rest)    Pulse 82    Temp 97.4 °F (36.3 °C)    Resp 18    Ht 5' 3\" (1.6 m)    Wt 72.6 kg (160 lb)    SpO2 95%    Breastfeeding No    BMI 28.34 kg/m2      O2 Device: Room air    Temp (24hrs), Av.8 °F (36.6 °C), Min:96.5 °F (35.8 °C), Max:99 °F (37.2 °C)    701 - 1900  In: 360 [P.O.:360]  Out: -    1901 -  0700  In: 830 [P.O.:830]  Out: -     General:  Alert, cooperative, no distress, appears stated age. Lungs:   Clear to auscultation bilaterally. Chest wall:  No tenderness or deformity. Heart:  Regular rate and rhythm, S1, S2 normal, no murmur, click, rub or gallop. Abdomen:   Soft, non-tender. Bowel sounds normal. No masses,  No organomegaly. Extremities: Extremities   Pulses: 2+ and symmetric all extremities. Skin: Skin color, texture, turgor normal. No rashes or lesions   Neurologic: Right LE 3/5 , left 5/5     Data Review:       Recent Days:  No results for input(s): WBC, HGB, HCT, PLT, HGBEXT, HCTEXT, PLTEXT, HGBEXT, HCTEXT, PLTEXT in the last 72 hours. No results for input(s): NA, K, CL, CO2, GLU, BUN, CREA, CA, MG, PHOS, ALB, TBIL, SGOT, ALT, INR in the last 72 hours. No lab exists for component: INREXT, INREXT  No results for input(s): PH, PCO2, PO2, HCO3, FIO2 in the last 72 hours. 24 Hour Results:  No results found for this or any previous visit (from the past 24 hour(s)).     Problem List:  Problem List as of 3/2/2017  Date Reviewed: 2017          Codes Class Noted - Resolved    * (Principal)Multiple sclerosis exacerbation ([facility] ICD-10-CM: G35  ICD-9-CM: 516  2017 - Present              Medications reviewed  Current Facility-Administered Medications   Medication Dose Route Frequency    pantoprazole (PROTONIX) tablet 40 mg  40 mg Oral DAILY    oseltamivir (TAMIFLU) capsule 75 mg  75 mg Oral Q24H    guaiFENesin (ROBITUSSIN) 100 mg/5 mL oral liquid 100 mg  100 mg Oral Q4H PRN    docusate sodium (COLACE) capsule 100 mg  100 mg Oral DAILY    bisacodyl (DULCOLAX) suppository 10 mg  10 mg Rectal DAILY PRN    ALPRAZolam (XANAX) tablet 0.25 mg  0.25 mg Oral DAILY PRN    acetaminophen (TYLENOL) tablet 650 mg  650 mg Oral Q4H PRN    sodium chloride (NS) flush 5-10 mL  5-10 mL IntraVENous Q8H    sodium chloride (NS) flush 5-10 mL  5-10 mL IntraVENous PRN    heparin (porcine) injection 5,000 Units  5,000 Units SubCUTAneous Q8H    tiZANidine (ZANAFLEX) tablet 4 mg  4 mg Oral Q8H PRN    diphenhydrAMINE (BENADRYL) injection 12.5 mg  12.5 mg IntraVENous Q6H PRN    sertraline (ZOLOFT) tablet 50 mg  50 mg Oral DAILY       Care Plan discussed with: Nurse    Total time spent with patient: 30 minutes.     Carina Alvarado MD

## 2017-03-02 NOTE — PROGRESS NOTES
Bedside and Verbal shift change report given to [facility] (oncoming nurse) by Camilo Riddle RN (offgoing nurse). Report included the following information SBAR, Kardex, MAR, Accordion and Recent Results.

## 2017-03-02 NOTE — PROGRESS NOTES
Bedside shift change report given to Harini Woodward (oncoming nurse) by Ben Grimaldo RN (offgoing nurse). Report included the following information SBAR and Kardex.

## 2017-03-02 NOTE — PROGRESS NOTES
Physical Therapy  3.2.17    15:38-- F/u with patient this PM. Patient continues to report nausea and upset stomach. Stated she had just received more medication. F/u tomorrow. Chart reviewed. In to see patient who reports she has been uncomfortable with an upset stomach this AM. RN aware and has administered medication per patient report. Patient politely requested therapist follow up later this PM. Will f/u later as able/appropriate for therapy. Thank you.     Mery Crocker, PT, DPT

## 2017-03-03 VITALS
RESPIRATION RATE: 18 BRPM | BODY MASS INDEX: 28.35 KG/M2 | HEART RATE: 81 BPM | OXYGEN SATURATION: 97 % | WEIGHT: 160 LBS | TEMPERATURE: 98.3 F | DIASTOLIC BLOOD PRESSURE: 62 MMHG | HEIGHT: 63 IN | SYSTOLIC BLOOD PRESSURE: 96 MMHG

## 2017-03-03 PROCEDURE — 74011250637 HC RX REV CODE- 250/637: Performed by: FAMILY MEDICINE

## 2017-03-03 PROCEDURE — 74011250637 HC RX REV CODE- 250/637: Performed by: NURSE PRACTITIONER

## 2017-03-03 PROCEDURE — 74011250636 HC RX REV CODE- 250/636: Performed by: INTERNAL MEDICINE

## 2017-03-03 PROCEDURE — 74011250637 HC RX REV CODE- 250/637: Performed by: INTERNAL MEDICINE

## 2017-03-03 RX ORDER — OSELTAMIVIR PHOSPHATE 75 MG/1
75 CAPSULE ORAL EVERY 24 HOURS
Qty: 2 CAP | Refills: 0 | Status: SHIPPED | OUTPATIENT
Start: 2017-03-04 | End: 2017-03-06

## 2017-03-03 RX ORDER — PANTOPRAZOLE SODIUM 40 MG/1
40 TABLET, DELAYED RELEASE ORAL DAILY
Qty: 1 TAB | Refills: 0 | Status: SHIPPED | OUTPATIENT
Start: 2017-03-03 | End: 2019-02-27 | Stop reason: ALTCHOICE

## 2017-03-03 RX ORDER — CALCIUM CARBONATE 200(500)MG
200 TABLET,CHEWABLE ORAL
Status: COMPLETED | OUTPATIENT
Start: 2017-03-03 | End: 2017-03-03

## 2017-03-03 RX ADMIN — HEPARIN SODIUM 5000 UNITS: 5000 INJECTION, SOLUTION INTRAVENOUS; SUBCUTANEOUS at 05:11

## 2017-03-03 RX ADMIN — OSELTAMIVIR PHOSPHATE 75 MG: 75 CAPSULE ORAL at 11:46

## 2017-03-03 RX ADMIN — PANTOPRAZOLE SODIUM 40 MG: 40 TABLET, DELAYED RELEASE ORAL at 11:31

## 2017-03-03 RX ADMIN — CALCIUM CARBONATE (ANTACID) CHEW TAB 500 MG 200 MG: 500 CHEW TAB at 05:14

## 2017-03-03 RX ADMIN — DOCUSATE SODIUM 100 MG: 100 CAPSULE, LIQUID FILLED ORAL at 11:31

## 2017-03-03 RX ADMIN — HEPARIN SODIUM 5000 UNITS: 5000 INJECTION, SOLUTION INTRAVENOUS; SUBCUTANEOUS at 17:57

## 2017-03-03 RX ADMIN — Medication 10 ML: at 05:14

## 2017-03-03 NOTE — DISCHARGE INSTRUCTIONS
Discharge Summary       PATIENT ID: Candi Lynne  MRN: [id number]   YOB: 1955    DATE OF ADMISSION: 2/24/2017  1:31 PM    DATE OF DISCHARGE: 2/28/17    PRIMARY CARE PROVIDER: Henri Castillo MD     ATTENDING PHYSICIAN: Carlos Robins   DISCHARGING PROVIDER: Eliana Connors MD    To contact this individual call [phone] and ask the  to page. If unavailable ask to be transferred the Adult Hospitalist Department. CONSULTATIONS: IP CONSULT TO NEUROLOGY    PROCEDURES/SURGERIES: * No surgery found *    ADMITTING DIAGNOSES & HOSPITAL COURSE:   The patient is a 1year-old female   with a past medical history of multiple sclerosis that was diagnosed in   [address] [facility] after she developed optic neuritis. She had been following up   with Dr. Kelsi Roberts in the past and saw Dr. Chandu Copeland in December of   last year. She states that she has done quite well over the years and   had been fairly functional, except for some right lower extremity   weakness and spasticity. Her last MRI of the brain in December was   stable as per records. She used to be on Avonex, which she took for   many years and then stopped taking it in 2005 and there was a plan to   restart it, but she has not been able to do that as yet. Botulinum toxin   injections were also considered for the right leg spasticity, but she   could not do it because of financial reasons. Over the past couple of   days, she noticed that her left leg was starting to feel weak. The right   leg was also feeling weaker than usual. She fell last night and was   unable to get up. She was brought into the hospital for further workup   and treatment. She denies any symptoms in the upper extremities. No   changes in vision, speech or swallowing ability. She reports some pain   in the left arm. She has also had urinary frequency.       1. Multiple sclerosis with possible exacerbation, resulting in a fall - appreciate neurology consult; continue IV Solumedrol; needs PT/OT evaluation finished 3 doses of steroids . 2. Fever - continue to assess for infection as cause of worsening weakness --> UA fairly unremarkable; blood culture (NGTD); stopped empiric zosyn   3. Mitral valve prolapse     4 zoloft         DISCHARGE DIAGNOSES / PLAN:      Needs PT        PENDING TEST RESULTS:   At the time of discharge the following test results are still pending:     FOLLOW UP APPOINTMENTS:    Follow-up Information     Follow up With Details Comments [address] Ed Colvin MD In 1 week  [address]      Jared Faustin, DO  As needed [facility]  [address]  [phone]             ADDITIONAL CARE RECOMMENDATIONS:   Please follow up PCP     DIET: Regular Diet      ACTIVITY: Activity as tolerated    WOUND CARE:     EQUIPMENT needed:       DISCHARGE MEDICATIONS:  Current Discharge Medication List      CONTINUE these medications which have NOT CHANGED    Details   sertraline (ZOLOFT) 50 mg tablet Take 50 mg by mouth daily. tiZANidine (ZANAFLEX) 4 mg tablet Take 1 Tab by mouth every eight (8) hours as needed. Qty: 60 Tab, Refills: 0               NOTIFY YOUR PHYSICIAN FOR ANY OF THE FOLLOWING:   Fever over 101 degrees for 24 hours. Chest pain, shortness of breath, fever, chills, nausea, vomiting, diarrhea, change in mentation, falling, weakness, bleeding. Severe pain or pain not relieved by medications. Or, any other signs or symptoms that you may have questions about.     DISPOSITION:    Home With:   OT  PT  HH  RN      x Long term SNF/Inpatient Rehab    Independent/assisted living    Hospice    Other:       PATIENT CONDITION AT DISCHARGE:     Functional status    Poor    x Deconditioned     Independent      Cognition    x Lucid     Forgetful     Dementia      Catheters/lines (plus indication)    Leon     PICC     PEG    x None      Code status   x  Full code     DNR      PHYSICAL EXAMINATION AT DISCHARGE:   Refer to Progress Note      CHRONIC MEDICAL DIAGNOSES:  Problem List as of 2/28/2017  Date Reviewed: 2/24/2017          Codes Class Noted - Resolved    * (Principal)Multiple sclerosis exacerbation ([facility] ICD-10-CM: G35  ICD-9-CM: 706  2/24/2017 - Present              Greater than 20  minutes were spent with the patient on counseling and coordination of care    Signed:   Margo Jean MD  2/28/2017  8:26 AM

## 2017-03-03 NOTE — PROGRESS NOTES
Hospitalist Progress Note         Annamaria Han MD     Call physician on-call through the  7pm-7am    Daily Progress Note: 3/3/2017    Admission summary and hospital course  The patient is a 1year-old female   with a past medical history of multiple sclerosis that was diagnosed in   [facility] after she developed optic neuritis. She had been following up   with Dr. Bart Polk in the past and saw Dr. Mary Villalobos in December of   last year. She states that she has done quite well over the years and   had been fairly functional, except for some right lower extremity   weakness and spasticity. Her last MRI of the brain in December was   stable as per records. She used to be on Avonex, which she took for   many years and then stopped taking it in 2005 and there was a plan to   restart it, but she has not been able to do that as yet. Botulinum toxin   injections were also considered for the right leg spasticity, but she   could not do it because of financial reasons. Over the past couple of   days, she noticed that her left leg was starting to feel weak. The right   leg was also feeling weaker than usual. She fell last night and was   unable to get up. She was brought into the hospital for further workup   and treatment. She denies any symptoms in the upper extremities. No   changes in vision, speech or swallowing ability. She reports some pain   in the left arm. She has also had urinary frequency.       Assessment/Plan:     1. Multiple sclerosis with possible exacerbation, resulting in a fall - appreciate neurology consult; continue IV Solumedrol; needs PT/OT evaluation finished 3 doses of steroids . 2. Fever - continue to assess for infection as cause of worsening weakness --> UA fairly unremarkable; blood culture (NGTD); stopped empiric zosyn   3.  Mitral valve prolapse      4 zoloft   5 patient exposed to Flu A positive room partner : started on tamiflu prophylaxis   patient nauseated with it but improving ,. Finishing 5 days course . She had flu shot in past         For placement  , peer to peer done and denied by [facility] as their  physician feels she doesnot need acute rehab for MS and lower extremity weakness !! VTE prophylaxis: SCD/lovenox  Discussed plan of care with Patient/Family and Nurse   Pre-admission lived at   Discharge planning:PT/OT          Subjective:   Nausea from tamiflu     Review of Systems:   A comprehensive review of systems was negative except mentioned in A/P    Objective:   Physical Exam:     Visit Vitals    /65 (BP 1 Location: Right arm, BP Patient Position: At rest)    Pulse 73    Temp 98.4 °F (36.9 °C)    Resp 18    Ht 5' 3\" (1.6 m)    Wt 72.6 kg (160 lb)    SpO2 95%    Breastfeeding No    BMI 28.34 kg/m2      O2 Device: Room air    Temp (24hrs), Av.4 °F (36.9 °C), Min:97.4 °F (36.3 °C), Max:99 °F (37.2 °C)    701 -  1900  In: 240 [P.O.:240]  Out: -     190 -  0700  In: 600 [P.O.:600]  Out: -     General:  Alert, cooperative, no distress, appears stated age. Lungs:   Clear to auscultation bilaterally. Chest wall:  No tenderness or deformity. Heart:  Regular rate and rhythm, S1, S2 normal, no murmur, click, rub or gallop. Abdomen:   Soft, non-tender. Bowel sounds normal. No masses,  No organomegaly. Extremities: Extremities   Pulses: 2+ and symmetric all extremities. Skin: Skin color, texture, turgor normal. No rashes or lesions   Neurologic: Right LE 3/5 , left 5/5     Data Review:       Recent Days:  No results for input(s): WBC, HGB, HCT, PLT, HGBEXT, HCTEXT, PLTEXT, HGBEXT, HCTEXT, PLTEXT in the last 72 hours. No results for input(s): NA, K, CL, CO2, GLU, BUN, CREA, CA, MG, PHOS, ALB, TBIL, SGOT, ALT, INR in the last 72 hours. No lab exists for component: INREXT, INREXT  No results for input(s): PH, PCO2, PO2, HCO3, FIO2 in the last 72 hours.     24 Hour Results:  No results found for this or any previous visit (from the past 24 hour(s)). Problem List:  Problem List as of 3/3/2017  Date Reviewed: 2/24/2017          Codes Class Noted - Resolved    * (Principal)Multiple sclerosis exacerbation ([facility] ICD-10-CM: G35  ICD-9-CM: 492  2/24/2017 - Present              Medications reviewed  Current Facility-Administered Medications   Medication Dose Route Frequency    pantoprazole (PROTONIX) tablet 40 mg  40 mg Oral DAILY    oseltamivir (TAMIFLU) capsule 75 mg  75 mg Oral Q24H    guaiFENesin (ROBITUSSIN) 100 mg/5 mL oral liquid 100 mg  100 mg Oral Q4H PRN    docusate sodium (COLACE) capsule 100 mg  100 mg Oral DAILY    bisacodyl (DULCOLAX) suppository 10 mg  10 mg Rectal DAILY PRN    ALPRAZolam (XANAX) tablet 0.25 mg  0.25 mg Oral DAILY PRN    acetaminophen (TYLENOL) tablet 650 mg  650 mg Oral Q4H PRN    sodium chloride (NS) flush 5-10 mL  5-10 mL IntraVENous Q8H    sodium chloride (NS) flush 5-10 mL  5-10 mL IntraVENous PRN    heparin (porcine) injection 5,000 Units  5,000 Units SubCUTAneous Q8H    tiZANidine (ZANAFLEX) tablet 4 mg  4 mg Oral Q8H PRN    diphenhydrAMINE (BENADRYL) injection 12.5 mg  12.5 mg IntraVENous Q6H PRN    sertraline (ZOLOFT) tablet 50 mg  50 mg Oral DAILY       Care Plan discussed with: Nurse    Total time spent with patient: 30 minutes.     Alexandro Arroyo MD

## 2017-03-03 NOTE — PROGRESS NOTES
Bedside shift change report given to Chica Vale RN (oncoming nurse) by Radha Dawkins RN (offgoing nurse). Report included the following information SBAR and Kardex.

## 2017-03-03 NOTE — PROGRESS NOTES
Bedside shift change report given to Maye Woodard RN (oncoming nurse) by Jaydon Hart (offgoing nurse). Report included the following information SBAR, MAR and Recent Results.

## 2017-03-03 NOTE — PROGRESS NOTES
Care Management Interventions  PCP Verified by CM: Yes (Dr. Kyra Babin)  Mode of Transport at Discharge: [facility])  Hospital Transport Time of Discharge: [address]: No  Discharge Durable Medical Equipment: No  Physical Therapy Consult: Yes  Occupational Therapy Consult: Yes  Speech Therapy Consult: No  Current Support Network: Lives Alone  Confirm Follow Up Transport: [address] discussed with Pt/Family/Caregiver: Yes  Freedom of Choice Offered: Yes  Discharge Location  Discharge Placement: Skilled nursing facility ([facility])    CM reviewed chart and received insurance authorization. [facility] will accept pt tonight. CM faxed AVS, discharge summary, and prescriptions to [facility]. CM confirmed discharge plan with patient and set up ambulance transport with [facility] for 6:30pm. Envelope containing MARs, Kardex, AVS, discharge summary, and prescriptions will be sent with pt. Nurse will call report, main number [phone] or direct to [facility] unit [phone].     AL Paige, ACM

## 2017-03-03 NOTE — PROGRESS NOTES
Physical Therapy  3.3.17    Chart reviewed. Note, patient currently awaiting insurance authorization for PRAM. Patient deferred therapy twice yesterday. Attempted to see patient this PM for therapy session, however she stated that she \"just didn't feel like it. \" Stated she was feeling intermittently nauseas and just wanted to rest. Continued to politely defer despite education on benefits of therapy. F/u Monday if patient has not d/c yet. Thank you.     Sumeet Reddy, PT, DPT

## 2017-03-04 NOTE — PROGRESS NOTES
I have reviewed discharge instructions with the patient. The patient verbalized understanding. I also called [facility] and report was given to Tory Castro. Prescriptions for Tamiflu sent. Pt's wheelchair went with the ambulance.

## 2017-03-09 ENCOUNTER — TELEPHONE (OUTPATIENT)
Dept: NEUROLOGY | Age: 62
End: 2017-03-09

## 2017-03-14 ENCOUNTER — TELEPHONE (OUTPATIENT)
Dept: NEUROLOGY | Age: 62
End: 2017-03-14

## 2017-03-16 ENCOUNTER — TELEPHONE (OUTPATIENT)
Dept: NEUROLOGY | Age: 62
End: 2017-03-16

## 2017-03-16 NOTE — TELEPHONE ENCOUNTER
Pre auth for has been denied for Avonex, calling to see if Dr. Jose Jacobs would like an appeal done. Please call [phone] option 2, ask for Radha.

## 2017-03-16 NOTE — TELEPHONE ENCOUNTER
Attempted to contact patient regarding [employer] attempts to contact patient . No answer. Left message to return call to office or [employer].

## 2017-03-20 ENCOUNTER — TELEPHONE (OUTPATIENT)
Dept: INTERNAL MEDICINE CLINIC | Age: 62
End: 2017-03-20

## 2017-03-20 NOTE — TELEPHONE ENCOUNTER
----- Message from Joanne José sent at 3/20/2017  2:26 PM EDT -----  Regarding: Dr. Liana Cao from [address] is requesting a call back to discuss home health care services for the pt. Best contact number [phone].

## 2017-03-21 NOTE — TELEPHONE ENCOUNTER
Spoke with . Wanted to make sure  would sign for ot and pt home health from [facility].  Per  ok to sign orders

## 2017-03-23 ENCOUNTER — TELEPHONE (OUTPATIENT)
Dept: NEUROLOGY | Age: 62
End: 2017-03-23

## 2017-03-23 NOTE — TELEPHONE ENCOUNTER
Spoke with Jacquelyn Crawford, with [address], request orders to resume Physical therapy to patient at Home. Reports patient no longer in Care Facility.

## 2017-03-23 NOTE — TELEPHONE ENCOUNTER
Alexa Richard with [facility] health calling because the pt was in [place] but left due to care issues. Manjula Vargas will not write orders because the pt left, Alexa Richard is wondering if Dr. Thaddeus Kruger will write orders.  Please give her a call back or Fax number: [phone]

## 2017-04-23 ENCOUNTER — HOSPITAL ENCOUNTER (EMERGENCY)
Age: 62
Discharge: HOME | End: 2017-04-23
Attending: STUDENT IN AN ORGANIZED HEALTH CARE EDUCATION/TRAINING PROGRAM
Payer: MEDICARE

## 2017-04-23 ENCOUNTER — APPOINTMENT (OUTPATIENT)
Dept: GENERAL RADIOLOGY | Age: 62
End: 2017-04-23
Attending: STUDENT IN AN ORGANIZED HEALTH CARE EDUCATION/TRAINING PROGRAM
Payer: MEDICARE

## 2017-04-23 VITALS
DIASTOLIC BLOOD PRESSURE: 71 MMHG | TEMPERATURE: 98.6 F | WEIGHT: 170 LBS | SYSTOLIC BLOOD PRESSURE: 130 MMHG | HEART RATE: 94 BPM | BODY MASS INDEX: 30.12 KG/M2 | OXYGEN SATURATION: 97 % | HEIGHT: 63 IN | RESPIRATION RATE: 22 BRPM

## 2017-04-23 DIAGNOSIS — R06.00 DYSPNEA, UNSPECIFIED TYPE: Primary | ICD-10-CM

## 2017-04-23 LAB
ALBUMIN SERPL BCP-MCNC: 4.3 G/DL (ref 3.5–5)
ALBUMIN/GLOB SERPL: 1.2 {RATIO} (ref 1.1–2.2)
ALP SERPL-CCNC: 61 U/L (ref 45–117)
ALT SERPL-CCNC: 35 U/L (ref 12–78)
ANION GAP BLD CALC-SCNC: 11 MMOL/L (ref 5–15)
ARTERIAL PATENCY WRIST A: YES
AST SERPL W P-5'-P-CCNC: 21 U/L (ref 15–37)
BASE EXCESS BLD CALC-SCNC: 4 MMOL/L
BASOPHILS # BLD AUTO: 0 K/UL (ref 0–0.1)
BASOPHILS # BLD: 0 % (ref 0–1)
BDY SITE: ABNORMAL
BILIRUB SERPL-MCNC: 0.5 MG/DL (ref 0.2–1)
BNP SERPL-MCNC: <4 PG/ML (ref 0–100)
BUN SERPL-MCNC: 8 MG/DL (ref 6–20)
BUN/CREAT SERPL: 12 (ref 12–20)
CALCIUM SERPL-MCNC: 9.6 MG/DL (ref 8.5–10.1)
CHLORIDE SERPL-SCNC: 102 MMOL/L (ref 97–108)
CO2 SERPL-SCNC: 26 MMOL/L (ref 21–32)
CREAT SERPL-MCNC: 0.66 MG/DL (ref 0.55–1.02)
D DIMER PPP FEU-MCNC: 0.19 MG/L FEU (ref 0–0.65)
EOSINOPHIL # BLD: 0 K/UL (ref 0–0.4)
EOSINOPHIL NFR BLD: 0 % (ref 0–7)
ERYTHROCYTE [DISTWIDTH] IN BLOOD BY AUTOMATED COUNT: 14 % (ref 11.5–14.5)
GAS FLOW.O2 O2 DELIVERY SYS: ABNORMAL L/MIN
GAS FLOW.O2 SETTING OXYMISER: 2 L/M
GLOBULIN SER CALC-MCNC: 3.7 G/DL (ref 2–4)
GLUCOSE SERPL-MCNC: 79 MG/DL (ref 65–100)
HCO3 BLD-SCNC: 29 MMOL/L (ref 22–26)
HCT VFR BLD AUTO: 41.4 % (ref 35–47)
HGB BLD-MCNC: 13.5 G/DL (ref 11.5–16)
LYMPHOCYTES # BLD AUTO: 31 % (ref 12–49)
LYMPHOCYTES # BLD: 1.6 K/UL (ref 0.8–3.5)
MCH RBC QN AUTO: 26.8 PG (ref 26–34)
MCHC RBC AUTO-ENTMCNC: 32.6 G/DL (ref 30–36.5)
MCV RBC AUTO: 82.1 FL (ref 80–99)
MONOCYTES # BLD: 0.4 K/UL (ref 0–1)
MONOCYTES NFR BLD AUTO: 9 % (ref 5–13)
NEUTS SEG # BLD: 3.2 K/UL (ref 1.8–8)
NEUTS SEG NFR BLD AUTO: 60 % (ref 32–75)
PCO2 BLD: 48 MMHG (ref 35–45)
PH BLD: 7.39 [PH] (ref 7.35–7.45)
PLATELET # BLD AUTO: 280 K/UL (ref 150–400)
PO2 BLD: 160 MMHG (ref 80–100)
POTASSIUM SERPL-SCNC: 3.1 MMOL/L (ref 3.5–5.1)
PROT SERPL-MCNC: 8 G/DL (ref 6.4–8.2)
RBC # BLD AUTO: 5.04 M/UL (ref 3.8–5.2)
SAO2 % BLD: 99 % (ref 92–97)
SODIUM SERPL-SCNC: 139 MMOL/L (ref 136–145)
SPECIMEN TYPE: ABNORMAL
TROPONIN I SERPL-MCNC: <0.04 NG/ML
WBC # BLD AUTO: 5.2 K/UL (ref 3.6–11)

## 2017-04-23 PROCEDURE — 99285 EMERGENCY DEPT VISIT HI MDM: CPT

## 2017-04-23 PROCEDURE — 77010033678 HC OXYGEN DAILY

## 2017-04-23 PROCEDURE — 85025 COMPLETE CBC W/AUTO DIFF WBC: CPT | Performed by: STUDENT IN AN ORGANIZED HEALTH CARE EDUCATION/TRAINING PROGRAM

## 2017-04-23 PROCEDURE — 83880 ASSAY OF NATRIURETIC PEPTIDE: CPT | Performed by: STUDENT IN AN ORGANIZED HEALTH CARE EDUCATION/TRAINING PROGRAM

## 2017-04-23 PROCEDURE — 80053 COMPREHEN METABOLIC PANEL: CPT | Performed by: STUDENT IN AN ORGANIZED HEALTH CARE EDUCATION/TRAINING PROGRAM

## 2017-04-23 PROCEDURE — 84484 ASSAY OF TROPONIN QUANT: CPT | Performed by: STUDENT IN AN ORGANIZED HEALTH CARE EDUCATION/TRAINING PROGRAM

## 2017-04-23 PROCEDURE — 93005 ELECTROCARDIOGRAM TRACING: CPT

## 2017-04-23 PROCEDURE — 82803 BLOOD GASES ANY COMBINATION: CPT

## 2017-04-23 PROCEDURE — 85379 FIBRIN DEGRADATION QUANT: CPT | Performed by: STUDENT IN AN ORGANIZED HEALTH CARE EDUCATION/TRAINING PROGRAM

## 2017-04-23 PROCEDURE — 71010 XR CHEST PORT: CPT

## 2017-04-23 PROCEDURE — 74011250636 HC RX REV CODE- 250/636: Performed by: STUDENT IN AN ORGANIZED HEALTH CARE EDUCATION/TRAINING PROGRAM

## 2017-04-23 PROCEDURE — 96374 THER/PROPH/DIAG INJ IV PUSH: CPT

## 2017-04-23 PROCEDURE — 36600 WITHDRAWAL OF ARTERIAL BLOOD: CPT

## 2017-04-23 PROCEDURE — 36415 COLL VENOUS BLD VENIPUNCTURE: CPT | Performed by: STUDENT IN AN ORGANIZED HEALTH CARE EDUCATION/TRAINING PROGRAM

## 2017-04-23 RX ORDER — LORAZEPAM 2 MG/ML
0.5 INJECTION INTRAMUSCULAR
Status: COMPLETED | OUTPATIENT
Start: 2017-04-23 | End: 2017-04-23

## 2017-04-23 RX ADMIN — LORAZEPAM 0.5 MG: 2 INJECTION, SOLUTION INTRAMUSCULAR; INTRAVENOUS at 15:51

## 2017-04-23 NOTE — ED PROVIDER NOTES
HPI Comments: 59 y.o. female with past medical history significant for multiple sclerosis and mitral valve prolapse who presents from home with chief complaint of shortness of breath. Pt states that she has been experiencing some shortness of breath and anxiety since last night which has progressively worsened the past 1-2 hours. Pt reports that she was seen here recently for her multiple sclerosis. Pt lives by herself. There are no other acute medical concerns at this time. Social hx: former smoker, + EtOH use, + drug use (marijuana)  PCP: Anitha Barclay MD    Note written by Humberto Villalpando, as dictated by Orlando Stinson MD 3:00 PM    The history is provided by the patient. No  was used. Past Medical History:   Diagnosis Date    Neurological disorder     Multiple sclerosis, in remission 2011    Other ill-defined conditions     Mitral Value Prolapse       Past Surgical History:   Procedure Laterality Date    ABDOMEN SURGERY PROC UNLISTED      hernia repair    HX HYSTERECTOMY           History reviewed. No pertinent family history. Social History     Social History    Marital status: SINGLE     Spouse name: N/A    Number of children: N/A    Years of education: N/A     Occupational History    Not on file. Social History Main Topics    Smoking status: Former Smoker    Smokeless tobacco: Not on file    Alcohol use Yes      Comment: social    Drug use: Yes     Special: Marijuana      Comment: occasional marijuana    Sexual activity: Not Currently     Other Topics Concern    Not on file     Social History Narrative         ALLERGIES: Review of patient's allergies indicates no known allergies. Review of Systems   Constitutional: Negative for chills, diaphoresis and fever. HENT: Negative for congestion, postnasal drip, rhinorrhea and sore throat. Eyes: Negative for photophobia, discharge, redness and visual disturbance.    Respiratory: Positive for shortness of breath. Negative for cough, chest tightness and wheezing. Cardiovascular: Negative for chest pain, palpitations and leg swelling. Gastrointestinal: Negative for abdominal distention, abdominal pain, blood in stool, constipation, diarrhea, nausea and vomiting. Genitourinary: Negative for difficulty urinating, dysuria, frequency, hematuria and urgency. Musculoskeletal: Negative for arthralgias, back pain, joint swelling and myalgias. Skin: Negative for color change and rash. Neurological: Negative for dizziness, speech difficulty, weakness, light-headedness, numbness and headaches. Psychiatric/Behavioral: Negative for confusion. The patient is nervous/anxious. All other systems reviewed and are negative. Vitals:    04/23/17 1447   BP: 124/69   Pulse: (!) 110   Resp: 24   Temp: 98.2 °F (36.8 °C)   SpO2: 100%   Weight: 77.1 kg (170 lb)   Height: 5' 3\" (1.6 m)            Physical Exam   Constitutional: She is oriented to person, place, and time. She appears well-developed and well-nourished. HENT:   Head: Normocephalic and atraumatic. Eyes: Conjunctivae and EOM are normal. Pupils are equal, round, and reactive to light. Neck: Normal range of motion. Neck supple. Cardiovascular: Normal rate, regular rhythm and normal heart sounds. No murmur heard. Pulmonary/Chest: Breath sounds normal. No respiratory distress. Hyperventilating   Abdominal: Soft. Bowel sounds are normal. She exhibits no distension. There is no tenderness. There is no rebound. Musculoskeletal: Normal range of motion. She exhibits no edema. Neurological: She is alert and oriented to person, place, and time. No cranial nerve deficit. She exhibits normal muscle tone. Coordination normal.   Skin: Skin is warm and dry. No rash noted. Psychiatric: Her behavior is normal. Her mood appears anxious. Nursing note and vitals reviewed.   Note written by Humberto Jeffries, as dictated by Ryan Cifuentes MD 3:00 PM    [facility]  ED Course       Procedures

## 2017-04-23 NOTE — DISCHARGE INSTRUCTIONS
Shortness of Breath: Care Instructions  Your Care Instructions  Shortness of breath has many causes. Sometimes conditions such as anxiety can lead to shortness of breath. Some people get mild shortness of breath when they exercise. Trouble breathing also can be a symptom of a serious problem, such as asthma, lung disease, emphysema, heart problems, and pneumonia. If your shortness of breath continues, you may need tests and treatment. Watch for any changes in your breathing and other symptoms. Follow-up care is a key part of your treatment and safety. Be sure to make and go to all appointments, and call your doctor if you are having problems. Its also a good idea to know your test results and keep a list of the medicines you take. How can you care for yourself at home? · Do not smoke or allow others to smoke around you. If you need help quitting, talk to your doctor about stop-smoking programs and medicines. These can increase your chances of quitting for good. · Get plenty of rest and sleep. · Take your medicines exactly as prescribed. Call your doctor if you think you are having a problem with your medicine. · Find healthy ways to deal with stress. ¨ Exercise daily. ¨ Get plenty of sleep. ¨ Eat regularly and well. When should you call for help? Call 911 anytime you think you may need emergency care. For example, call if:  · You have severe shortness of breath. · You have symptoms of a heart attack. These may include:  ¨ Chest pain or pressure, or a strange feeling in the chest.  ¨ Sweating. ¨ Shortness of breath. ¨ Nausea or vomiting. ¨ Pain, pressure, or a strange feeling in the back, neck, jaw, or upper belly or in one or both shoulders or arms. ¨ Lightheadedness or sudden weakness. ¨ A fast or irregular heartbeat. After you call 911, the  may tell you to chew 1 adult-strength or 2 to 4 low-dose aspirin. Wait for an ambulance. Do not try to drive yourself.   Call your doctor now or seek immediate medical care if:  · Your shortness of breath gets worse or you start to wheeze. Wheezing is a high-pitched sound when you breathe. · You wake up at night out of breath or have to prop your head up on several pillows to breathe. · You are short of breath after only light activity or while at rest.  Watch closely for changes in your health, and be sure to contact your doctor if:  · You do not get better over the next 1 to 2 days. Where can you learn more? Go to http://sudheer-chemo.info/. Enter S780 in the search box to learn more about \"Shortness of Breath: Care Instructions. \"  Current as of: May 23, 2016  Content Version: 11.2  © 7500-8688 [employer]. Care instructions adapted under license by [employer] (which disclaims liability or warranty for this information). If you have questions about a medical condition or this instruction, always ask your healthcare professional. [place] [address] any warranty or liability for your use of this information. We hope that we have addressed all of your medical concerns. The examination and treatment you received in the Emergency Department were for an emergent problem and were not intended as complete care. It is important that you follow up with your healthcare provider(s) for ongoing care. If your symptoms worsen or do not improve as expected, and you are unable to reach your usual health care provider(s), you should return to the Emergency Department. Today's healthcare is undergoing tremendous change, and patient satisfaction surveys are one of the many tools to assess the quality of medical care. You may receive a survey from the [employer] organization regarding your experience in the Emergency Department. I hope that your experience has been completely positive, particularly the medical care that I provided.   As such, please participate in the survey; anything less than excellent does not meet my expectations or intentions. [facility] and [facility] participate in nationally recognized quality of care measures. If your blood pressure is greater than 120/80, as reported below, we urge that you seek medical care to address the potential of high blood pressure, commonly known as hypertension. Hypertension can be hereditary or can be caused by certain medical conditions, pain, stress, or \"white coat syndrome. \"       Please make an appointment with your health care provider(s) for follow up of your Emergency Department visit. VITALS:   Patient Vitals for the past 8 hrs:   Temp Pulse Resp BP SpO2   04/23/17 1645 - 92 13 138/80 100 %   04/23/17 1642 - - - - 100 %   04/23/17 1630 - 88 15 130/75 99 %   04/23/17 1600 - - - 131/66 100 %   04/23/17 1515 - - - 120/57 100 %   04/23/17 1447 98.2 °F (36.8 °C) (!) 110 24 124/69 100 %          Thank you for allowing us to provide you with medical care today. We realize that you have many choices for your emergency care needs. Please choose us in the future for any continued health care needs. Darius Fontanez, [address] [place] [address].   Office: [phone]            Recent Results (from the past 24 hour(s))   EKG, 12 LEAD, INITIAL    Collection Time: 04/23/17  3:17 PM   Result Value Ref Range    Ventricular Rate 89 BPM    Atrial Rate 89 BPM    P-R Interval 134 ms    QRS Duration 62 ms    Q-T Interval 390 ms    QTC Calculation (Bezet) 474 ms    Calculated P Axis 38 degrees    Calculated R Axis 59 degrees    Calculated T Axis 59 degrees    Diagnosis       Normal sinus rhythm  When compared with ECG of 24-FEB-2017 14:11,  No significant change was found     CBC WITH AUTOMATED DIFF    Collection Time: 04/23/17  3:56 PM   Result Value Ref Range    WBC 5.2 3.6 - 11.0 K/uL    RBC 5.04 3.80 - 5.20 M/uL    HGB 13.5 11.5 - 16.0 g/dL    HCT 41.4 35.0 - 47.0 % MCV 82.1 80.0 - 99.0 FL    MCH 26.8 26.0 - 34.0 PG    MCHC 32.6 30.0 - 36.5 g/dL    RDW 14.0 11.5 - 14.5 %    PLATELET 933 705 - 830 K/uL    NEUTROPHILS 60 32 - 75 %    LYMPHOCYTES 31 12 - 49 %    MONOCYTES 9 5 - 13 %    EOSINOPHILS 0 0 - 7 %    BASOPHILS 0 0 - 1 %    ABS. NEUTROPHILS 3.2 1.8 - 8.0 K/UL    ABS. LYMPHOCYTES 1.6 0.8 - 3.5 K/UL    ABS. MONOCYTES 0.4 0.0 - 1.0 K/UL    ABS. EOSINOPHILS 0.0 0.0 - 0.4 K/UL    ABS. BASOPHILS 0.0 0.0 - 0.1 K/UL   METABOLIC PANEL, COMPREHENSIVE    Collection Time: 04/23/17  3:56 PM   Result Value Ref Range    Sodium 139 136 - 145 mmol/L    Potassium 3.1 (L) 3.5 - 5.1 mmol/L    Chloride 102 97 - 108 mmol/L    CO2 26 21 - 32 mmol/L    Anion gap 11 5 - 15 mmol/L    Glucose 79 65 - 100 mg/dL    BUN 8 6 - 20 MG/DL    Creatinine 0.66 0.55 - 1.02 MG/DL    BUN/Creatinine ratio 12 12 - 20      GFR est AA >60 >60 ml/min/1.73m2    GFR est non-AA >60 >60 ml/min/1.73m2    Calcium 9.6 8.5 - 10.1 MG/DL    Bilirubin, total 0.5 0.2 - 1.0 MG/DL    ALT (SGPT) 35 12 - 78 U/L    AST (SGOT) 21 15 - 37 U/L    Alk.  phosphatase 61 45 - 117 U/L    Protein, total 8.0 6.4 - 8.2 g/dL    Albumin 4.3 3.5 - 5.0 g/dL    Globulin 3.7 2.0 - 4.0 g/dL    A-G Ratio 1.2 1.1 - 2.2     TROPONIN I    Collection Time: 04/23/17  3:56 PM   Result Value Ref Range    Troponin-I, Qt. <0.04 <0.05 ng/mL   BNP    Collection Time: 04/23/17  3:56 PM   Result Value Ref Range    BNP <4 0 - 100 pg/mL   D DIMER    Collection Time: 04/23/17  3:56 PM   Result Value Ref Range    D-dimer 0.19 0.00 - 0.65 mg/L FEU   POC G3 - PUL    Collection Time: 04/23/17  4:44 PM   Result Value Ref Range    pH (POC) 7.390 7.35 - 7.45      pCO2 (POC) 48.0 (H) 35.0 - 45.0 MMHG    pO2 (POC) 160 (H) 80 - 100 MMHG    HCO3 (POC) 29.0 (H) 22 - 26 MMOL/L    sO2 (POC) 99 (H) 92 - 97 %    Base excess (POC) 4 mmol/L    Site LEFT RADIAL      Device: NASAL CANNULA      Flow rate (POC) 2 L/M    Allens test (POC) YES      Specimen type (POC) ARTERIAL         Xr Chest Port    Result Date: 4/23/2017  INDICATION:  chest pain EXAM: Chest single view. COMPARISON: 2/24/2017. FINDINGS: A single frontal view of the chest at 1506 hours shows clear lungs. The heart, mediastinum and pulmonary vasculature are stable . The bony thorax is unremarkable for age. Chales Minus IMPRESSION: No acute cardiopulmonary disease radiographically. .  .

## 2017-04-23 NOTE — ED NOTES
Patient observed to have an oxygen saturation of 54% with a good waveform. RN walked in to patient room and she awoke to verbal stimuli. Patient was placed on 2 L/min via nasal cannula and MD was notified.  Patient now alert and talking with oxygen saturation of 99%
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
## 2017-04-23 NOTE — ED TRIAGE NOTES
Patient reports she is having some work done on her apartment and for the last couple hours she is feeling anxious and short of breath. She has history of MS. She denies chest pain nausea vomiting diaphoresis. She thinks this episode is related to her MS and not stress.

## 2017-04-24 ENCOUNTER — PATIENT OUTREACH (OUTPATIENT)
Dept: INTERNAL MEDICINE CLINIC | Age: 62
End: 2017-04-24

## 2017-04-24 LAB
ATRIAL RATE: 89 BPM
CALCULATED P AXIS, ECG09: 38 DEGREES
CALCULATED R AXIS, ECG10: 59 DEGREES
CALCULATED T AXIS, ECG11: 59 DEGREES
DIAGNOSIS, 93000: NORMAL
P-R INTERVAL, ECG05: 134 MS
Q-T INTERVAL, ECG07: 390 MS
QRS DURATION, ECG06: 62 MS
QTC CALCULATION (BEZET), ECG08: 474 MS
VENTRICULAR RATE, ECG03: 89 BPM
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
## 2017-04-24 NOTE — PROGRESS NOTES
Followed up with phone call today to pt, cell [phone]. Asked if she needed any assistance with follow up and she stated that she did not. CM inquired about her next follow up appointment and she stated she has an appointment with Dr. Issa Benson neurologist in 3 weeks but didn't have transport. She advised she has one friend who drives her and that friend is not available. Attempted to help problem solve transport with pt but she advised that she couldn't take number down at this time and requested that CM call back tomorrow. Attempted to give phone number of [employer] rides, phone [phone] and also to suggest pt apply for [address] [facility] [address] transportation. She was not receptive to either of these at this time but requested CM to call back tomorrow. CM will ask teammate to follow up again by phone call on Tuesday. TERESE Haas      2:15pm  CM also called [facility] SCOTTY Daley, -[phone] and reviewed pt's reports of transportation issues for any community resource recommendations from medical group.       TERESE Haas

## 2017-04-24 NOTE — ED NOTES
Pt given discharge instructions. Questions answered and pt states understanding, no distress noted, pt wheeled out of unit to wait for the cab.

## 2017-04-25 NOTE — PROGRESS NOTES
Care Management Note    Phone call place to Liudmila Young [phone], discussed with her options about transportation including insurance through her Medicare [employer] plan - [phone], we also once again discussed her applying for [address] [place] [address]. She was appreciative of information. She states she is feeling some better.     Dereck Soares RN, BSN, [facility]  ED Care Management  [phone]

## 2017-04-26 ENCOUNTER — PATIENT OUTREACH (OUTPATIENT)
Dept: INTERNAL MEDICINE CLINIC | Age: 62
End: 2017-04-26
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
## 2017-04-28 ENCOUNTER — OFFICE VISIT (OUTPATIENT)
Dept: INTERNAL MEDICINE CLINIC | Age: 62
End: 2017-04-28

## 2017-04-28 ENCOUNTER — APPOINTMENT (OUTPATIENT)
Dept: CT IMAGING | Age: 62
End: 2017-04-28
Attending: STUDENT IN AN ORGANIZED HEALTH CARE EDUCATION/TRAINING PROGRAM
Payer: MEDICARE

## 2017-04-28 ENCOUNTER — APPOINTMENT (OUTPATIENT)
Dept: GENERAL RADIOLOGY | Age: 62
End: 2017-04-28
Attending: EMERGENCY MEDICINE
Payer: MEDICARE

## 2017-04-28 ENCOUNTER — HOSPITAL ENCOUNTER (EMERGENCY)
Age: 62
Discharge: HOME | End: 2017-04-28
Attending: STUDENT IN AN ORGANIZED HEALTH CARE EDUCATION/TRAINING PROGRAM
Payer: MEDICARE

## 2017-04-28 VITALS
DIASTOLIC BLOOD PRESSURE: 75 MMHG | WEIGHT: 168 LBS | RESPIRATION RATE: 16 BRPM | BODY MASS INDEX: 29.77 KG/M2 | SYSTOLIC BLOOD PRESSURE: 116 MMHG | HEART RATE: 99 BPM | TEMPERATURE: 98.6 F | OXYGEN SATURATION: 98 % | HEIGHT: 63 IN

## 2017-04-28 VITALS
HEIGHT: 63 IN | HEART RATE: 94 BPM | WEIGHT: 153 LBS | DIASTOLIC BLOOD PRESSURE: 49 MMHG | RESPIRATION RATE: 18 BRPM | TEMPERATURE: 97.9 F | SYSTOLIC BLOOD PRESSURE: 123 MMHG | BODY MASS INDEX: 27.11 KG/M2 | OXYGEN SATURATION: 100 %

## 2017-04-28 DIAGNOSIS — R06.02 SOB (SHORTNESS OF BREATH): Primary | ICD-10-CM

## 2017-04-28 DIAGNOSIS — R06.00 DYSPNEA, UNSPECIFIED TYPE: Primary | ICD-10-CM

## 2017-04-28 LAB
ALBUMIN SERPL BCP-MCNC: 4 G/DL (ref 3.5–5)
ALBUMIN/GLOB SERPL: 1.2 {RATIO} (ref 1.1–2.2)
ALP SERPL-CCNC: 54 U/L (ref 45–117)
ALT SERPL-CCNC: 31 U/L (ref 12–78)
ANION GAP BLD CALC-SCNC: 8 MMOL/L (ref 5–15)
AST SERPL W P-5'-P-CCNC: 20 U/L (ref 15–37)
BASOPHILS # BLD AUTO: 0 K/UL (ref 0–0.1)
BASOPHILS # BLD: 0 % (ref 0–1)
BILIRUB SERPL-MCNC: 0.5 MG/DL (ref 0.2–1)
BUN SERPL-MCNC: 10 MG/DL (ref 6–20)
BUN/CREAT SERPL: 17 (ref 12–20)
CALCIUM SERPL-MCNC: 9 MG/DL (ref 8.5–10.1)
CHLORIDE SERPL-SCNC: 104 MMOL/L (ref 97–108)
CO2 SERPL-SCNC: 29 MMOL/L (ref 21–32)
CREAT SERPL-MCNC: 0.58 MG/DL (ref 0.55–1.02)
EOSINOPHIL # BLD: 0 K/UL (ref 0–0.4)
EOSINOPHIL NFR BLD: 0 % (ref 0–7)
ERYTHROCYTE [DISTWIDTH] IN BLOOD BY AUTOMATED COUNT: 14.3 % (ref 11.5–14.5)
GLOBULIN SER CALC-MCNC: 3.3 G/DL (ref 2–4)
GLUCOSE SERPL-MCNC: 78 MG/DL (ref 65–100)
HCT VFR BLD AUTO: 38.5 % (ref 35–47)
HGB BLD-MCNC: 12.7 G/DL (ref 11.5–16)
LYMPHOCYTES # BLD AUTO: 32 % (ref 12–49)
LYMPHOCYTES # BLD: 2.1 K/UL (ref 0.8–3.5)
MCH RBC QN AUTO: 27.1 PG (ref 26–34)
MCHC RBC AUTO-ENTMCNC: 33 G/DL (ref 30–36.5)
MCV RBC AUTO: 82.3 FL (ref 80–99)
MONOCYTES # BLD: 0.5 K/UL (ref 0–1)
MONOCYTES NFR BLD AUTO: 7 % (ref 5–13)
NEUTS SEG # BLD: 3.9 K/UL (ref 1.8–8)
NEUTS SEG NFR BLD AUTO: 61 % (ref 32–75)
PLATELET # BLD AUTO: 356 K/UL (ref 150–400)
POTASSIUM SERPL-SCNC: 3.7 MMOL/L (ref 3.5–5.1)
PROT SERPL-MCNC: 7.3 G/DL (ref 6.4–8.2)
RBC # BLD AUTO: 4.68 M/UL (ref 3.8–5.2)
SODIUM SERPL-SCNC: 141 MMOL/L (ref 136–145)
WBC # BLD AUTO: 6.4 K/UL (ref 3.6–11)

## 2017-04-28 PROCEDURE — 36415 COLL VENOUS BLD VENIPUNCTURE: CPT | Performed by: EMERGENCY MEDICINE

## 2017-04-28 PROCEDURE — 71020 XR CHEST PA LAT: CPT

## 2017-04-28 PROCEDURE — 80053 COMPREHEN METABOLIC PANEL: CPT | Performed by: EMERGENCY MEDICINE

## 2017-04-28 PROCEDURE — 74011000258 HC RX REV CODE- 258: Performed by: STUDENT IN AN ORGANIZED HEALTH CARE EDUCATION/TRAINING PROGRAM

## 2017-04-28 PROCEDURE — 85025 COMPLETE CBC W/AUTO DIFF WBC: CPT | Performed by: EMERGENCY MEDICINE

## 2017-04-28 PROCEDURE — 71275 CT ANGIOGRAPHY CHEST: CPT

## 2017-04-28 PROCEDURE — 74011636320 HC RX REV CODE- 636/320: Performed by: STUDENT IN AN ORGANIZED HEALTH CARE EDUCATION/TRAINING PROGRAM

## 2017-04-28 PROCEDURE — 74011250637 HC RX REV CODE- 250/637: Performed by: STUDENT IN AN ORGANIZED HEALTH CARE EDUCATION/TRAINING PROGRAM

## 2017-04-28 PROCEDURE — 99284 EMERGENCY DEPT VISIT MOD MDM: CPT

## 2017-04-28 RX ORDER — SODIUM CHLORIDE 0.9 % (FLUSH) 0.9 %
10 SYRINGE (ML) INJECTION
Status: COMPLETED | OUTPATIENT
Start: 2017-04-28 | End: 2017-04-28

## 2017-04-28 RX ORDER — TIZANIDINE 4 MG/1
2 TABLET ORAL
Qty: 60 TAB | Refills: 1 | Status: CANCELLED | OUTPATIENT
Start: 2017-04-28

## 2017-04-28 RX ORDER — INTERFERON BETA-1A 44 UG/.5ML
0.5 INJECTION, SOLUTION SUBCUTANEOUS
Status: ON HOLD | COMMUNITY
Start: 2017-04-11 | End: 2018-08-21

## 2017-04-28 RX ORDER — DIAZEPAM 5 MG/1
5 TABLET ORAL
Status: COMPLETED | OUTPATIENT
Start: 2017-04-28 | End: 2017-04-28

## 2017-04-28 RX ADMIN — IOPAMIDOL 80 ML: 755 INJECTION, SOLUTION INTRAVENOUS at 15:37

## 2017-04-28 RX ADMIN — DIAZEPAM 5 MG: 5 TABLET ORAL at 16:19

## 2017-04-28 RX ADMIN — SODIUM CHLORIDE 100 ML: 900 INJECTION, SOLUTION INTRAVENOUS at 15:37

## 2017-04-28 RX ADMIN — Medication 10 ML: at 15:37

## 2017-04-28 NOTE — ED PROVIDER NOTES
HPI Comments: 59 y.o. female with past medical history significant for MS and mitral valve prolapse who presents on referral from her PCP for a CT. Pt was seen in ED 5 days ago for the same complaints and still states that she is having trouble breathing. At the time she had a negative workup and negative D-dimer. It feels like she can't get enough air in her lungs. Pt was seen at her PCP today and he recommended that she come to ED for CT scan. She denies any fever or cough. She is also having decreased R leg movement from MS but she states this is baseline. . There are no other acute medical concerns at this time. PCP: Sisi Washington MD    Note written by Humberto Jacob, as dictated by Chaitanya Clark MD 2:21 PM      The history is provided by the patient. No  was used. Past Medical History:   Diagnosis Date    Multiple sclerosis ([facility] [address]     Neurological disorder     Multiple sclerosis, in remission 2011    Other ill-defined conditions     Mitral Value Prolapse       Past Surgical History:   Procedure Laterality Date    ABDOMEN SURGERY PROC UNLISTED      hernia repair    HX HYSTERECTOMY           History reviewed. No pertinent family history. Social History     Social History    Marital status: SINGLE     Spouse name: N/A    Number of children: N/A    Years of education: N/A     Occupational History    Not on file. Social History Main Topics    Smoking status: Former Smoker    Smokeless tobacco: Not on file    Alcohol use Yes      Comment: social    Drug use: Yes     Special: Marijuana      Comment: occasional marijuana    Sexual activity: Not Currently     Other Topics Concern    Not on file     Social History Narrative         ALLERGIES: Review of patient's allergies indicates no known allergies. Review of Systems   Constitutional: Negative for fever. Respiratory: Positive for shortness of breath. Negative for cough.     All other systems reviewed and are negative. Vitals:    04/28/17 1310   BP: 103/73   Pulse: 99   Resp: 18   Temp: 97.8 °F (36.6 °C)   SpO2: 100%   Weight: 69.4 kg (153 lb)   Height: 5' 3\" (1.6 m)            Physical Exam   Constitutional: She is oriented to person, place, and time. She appears well-developed and well-nourished. HENT:   Head: Normocephalic and atraumatic. Eyes: Conjunctivae and EOM are normal. Pupils are equal, round, and reactive to light. Neck: Normal range of motion. Neck supple. Cardiovascular: Normal rate, regular rhythm and normal heart sounds. No murmur heard. Pulmonary/Chest: Effort normal and breath sounds normal. No respiratory distress. Abdominal: Soft. Bowel sounds are normal. She exhibits no distension. There is no tenderness. There is no rebound. Musculoskeletal: Normal range of motion. She exhibits no edema. Neurological: She is alert and oriented to person, place, and time. No cranial nerve deficit. She exhibits normal muscle tone. Coordination normal.   Skin: Skin is warm and dry. No rash noted. Psychiatric: She has a normal mood and affect. Her behavior is normal.   Nursing note and vitals reviewed.    Note written by Humberto Fitzgerald, as dictated by Cristiano Ibanez MD 2:22 PM      [facility]  ED Course       Procedures

## 2017-04-28 NOTE — DISCHARGE INSTRUCTIONS
Shortness of Breath: Care Instructions  Your Care Instructions  Shortness of breath has many causes. Sometimes conditions such as anxiety can lead to shortness of breath. Some people get mild shortness of breath when they exercise. Trouble breathing also can be a symptom of a serious problem, such as asthma, lung disease, emphysema, heart problems, and pneumonia. If your shortness of breath continues, you may need tests and treatment. Watch for any changes in your breathing and other symptoms. Follow-up care is a key part of your treatment and safety. Be sure to make and go to all appointments, and call your doctor if you are having problems. Its also a good idea to know your test results and keep a list of the medicines you take. How can you care for yourself at home? · Do not smoke or allow others to smoke around you. If you need help quitting, talk to your doctor about stop-smoking programs and medicines. These can increase your chances of quitting for good. · Get plenty of rest and sleep. · Take your medicines exactly as prescribed. Call your doctor if you think you are having a problem with your medicine. · Find healthy ways to deal with stress. ¨ Exercise daily. ¨ Get plenty of sleep. ¨ Eat regularly and well. When should you call for help? Call 911 anytime you think you may need emergency care. For example, call if:  · You have severe shortness of breath. · You have symptoms of a heart attack. These may include:  ¨ Chest pain or pressure, or a strange feeling in the chest.  ¨ Sweating. ¨ Shortness of breath. ¨ Nausea or vomiting. ¨ Pain, pressure, or a strange feeling in the back, neck, jaw, or upper belly or in one or both shoulders or arms. ¨ Lightheadedness or sudden weakness. ¨ A fast or irregular heartbeat. After you call 911, the  may tell you to chew 1 adult-strength or 2 to 4 low-dose aspirin. Wait for an ambulance. Do not try to drive yourself.   Call your doctor now or seek immediate medical care if:  · Your shortness of breath gets worse or you start to wheeze. Wheezing is a high-pitched sound when you breathe. · You wake up at night out of breath or have to prop your head up on several pillows to breathe. · You are short of breath after only light activity or while at rest.  Watch closely for changes in your health, and be sure to contact your doctor if:  · You do not get better over the next 1 to 2 days. Where can you learn more? Go to http://sudheer-chemo.info/. Enter S780 in the search box to learn more about \"Shortness of Breath: Care Instructions. \"  Current as of: May 23, 2016  Content Version: 11.2  © 3139-1536 [employer]. Care instructions adapted under license by [employer] (which disclaims liability or warranty for this information). If you have questions about a medical condition or this instruction, always ask your healthcare professional. [place] [address] any warranty or liability for your use of this information. We hope that we have addressed all of your medical concerns. The examination and treatment you received in the Emergency Department were for an emergent problem and were not intended as complete care. It is important that you follow up with your healthcare provider(s) for ongoing care. If your symptoms worsen or do not improve as expected, and you are unable to reach your usual health care provider(s), you should return to the Emergency Department. Today's healthcare is undergoing tremendous change, and patient satisfaction surveys are one of the many tools to assess the quality of medical care. You may receive a survey from the [employer] organization regarding your experience in the Emergency Department. I hope that your experience has been completely positive, particularly the medical care that I provided.   As such, please participate in the survey; anything less than excellent does not meet my expectations or intentions. [facility] and [facility]3] participate in nationally recognized quality of care measures. If your blood pressure is greater than 120/80, as reported below, we urge that you seek medical care to address the potential of high blood pressure, commonly known as hypertension. Hypertension can be hereditary or can be caused by certain medical conditions, pain, stress, or \"white coat syndrome. \"       Please make an appointment with your health care provider(s) for follow up of your Emergency Department visit. VITALS:   Patient Vitals for the past 8 hrs:   Temp Pulse Resp BP SpO2   04/28/17 1553 - 95 18 - 100 %   04/28/17 1310 97.8 °F (36.6 °C) 99 18 103/73 100 %          Thank you for allowing us to provide you with medical care today. We realize that you have many choices for your emergency care needs. Please choose us in the future for any continued health care needs. [facility] [place], [address] [place] [address].   Office: [phone]            Recent Results (from the past 24 hour(s))   CBC WITH AUTOMATED DIFF    Collection Time: 04/28/17  2:01 PM   Result Value Ref Range    WBC 6.4 3.6 - 11.0 K/uL    RBC 4.68 3.80 - 5.20 M/uL    HGB 12.7 11.5 - 16.0 g/dL    HCT 38.5 35.0 - 47.0 %    MCV 82.3 80.0 - 99.0 FL    MCH 27.1 26.0 - 34.0 PG    MCHC 33.0 30.0 - 36.5 g/dL    RDW 14.3 11.5 - 14.5 %    PLATELET 978 067 - 438 K/uL    NEUTROPHILS 61 32 - 75 %    LYMPHOCYTES 32 12 - 49 %    MONOCYTES 7 5 - 13 %    EOSINOPHILS 0 0 - 7 %    BASOPHILS 0 0 - 1 %    ABS. NEUTROPHILS 3.9 1.8 - 8.0 K/UL    ABS. LYMPHOCYTES 2.1 0.8 - 3.5 K/UL    ABS. MONOCYTES 0.5 0.0 - 1.0 K/UL    ABS. EOSINOPHILS 0.0 0.0 - 0.4 K/UL    ABS.  BASOPHILS 0.0 0.0 - 0.1 K/UL   METABOLIC PANEL, COMPREHENSIVE    Collection Time: 04/28/17  2:01 PM   Result Value Ref Range    Sodium 141 136 - 145 mmol/L    Potassium 3.7 3.5 - 5.1 mmol/L    Chloride 104 97 - 108 mmol/L    CO2 29 21 - 32 mmol/L    Anion gap 8 5 - 15 mmol/L    Glucose 78 65 - 100 mg/dL    BUN 10 6 - 20 MG/DL    Creatinine 0.58 0.55 - 1.02 MG/DL    BUN/Creatinine ratio 17 12 - 20      GFR est AA >60 >60 ml/min/1.73m2    GFR est non-AA >60 >60 ml/min/1.73m2    Calcium 9.0 8.5 - 10.1 MG/DL    Bilirubin, total 0.5 0.2 - 1.0 MG/DL    ALT (SGPT) 31 12 - 78 U/L    AST (SGOT) 20 15 - 37 U/L    Alk. phosphatase 54 45 - 117 U/L    Protein, total 7.3 6.4 - 8.2 g/dL    Albumin 4.0 3.5 - 5.0 g/dL    Globulin 3.3 2.0 - 4.0 g/dL    A-G Ratio 1.2 1.1 - 2.2         Xr Chest Pa Lat    Result Date: 4/28/2017  EXAM:  XR CHEST PA LAT INDICATION:  Anxiety and shortness of breath. COMPARISON: 4/23/2017 TECHNIQUE: AP and lateral chest views FINDINGS: The cardiomediastinal contours are stable. The pulmonary vasculature is within normal limits. The lungs and pleural spaces are clear. The bones and upper abdomen are stable. IMPRESSION: No acute process. Stable exam.     Cta Chest W Or W Wo Cont    Result Date: 4/28/2017  EXAM: CT ANGIOGRAPHY CHEST INDICATION:  Shortness of breath produced by exertion or stress; r/o PE COMPARISON: None. Loren Pretty CONTRAST: 80 mL of Isovue-370. TECHNIQUE: Precontrast  images were obtained to localize the volume for acquisition. Multislice helical CT arteriography was performed from the diaphragm to the thoracic inlet during uneventful rapid bolus intravenous contrast administration. Lung and soft tissue windows were generated. Coronal and sagittal  reformatted images were also generated. 3D post processing consisting of coronal maximum intensity projection images was performed. CT dose reduction was achieved through use of a standardized protocol tailored for this examination and automatic exposure control for dose modulation. FINDINGS: The lungs are clear of mass, nodule, airspace disease or edema.  The pulmonary arteries are well enhanced and no pulmonary emboli are identified. There is no mediastinal or hilar adenopathy or mass. The aorta enhances normally without evidence of aneurysm or dissection. The visualized portions of the upper abdominal organs are normal.     IMPRESSION: 1. No CT evidence for central pulmonary embolus at this time .

## 2017-04-28 NOTE — ED TRIAGE NOTES
Pt arrives with feeling like she can't catch her breath & anxiety. Has been seen for this several times & was set up to see a pulmonologist but she can not afford to go. VSS. 100 % on RA.

## 2017-04-28 NOTE — PROGRESS NOTES
Chief Complaint   Patient presents with    Breathing Problem     x few weeks   [facility]     4/23/17 SOB     she is a 59y.o. year old female who presents with SOB. States that she has had symptoms for 3 weeks now and they have not improved. PT was admited to the [facility] ED 23/04/17 for SOB and was diagnosed with Dyspnea. PT was prescribed Ativan and it helped her symptoms. Pt states of no other symptoms at this time. States that \"i can't breath, I feel like I am gonna die\". Reviewed and agree with Nurse Note and duplicated in this note. Reviewed PmHx, RxHx, FmHx, SocHx, AllgHx and updated and dated in the chart. No family history on file.     Past Medical History:   Diagnosis Date    Neurological disorder     Multiple sclerosis, in remission 2011    Other ill-defined conditions     Mitral Value Prolapse      Social History     Social History    Marital status: SINGLE     Spouse name: N/A    Number of children: N/A    Years of education: N/A     Social History Main Topics    Smoking status: Former Smoker    Smokeless tobacco: Not on file    Alcohol use Yes      Comment: social    Drug use: Yes     Special: Marijuana      Comment: occasional marijuana    Sexual activity: Not Currently     Other Topics Concern    Not on file     Social History Narrative        Review of Systems - negative except as listed above      Objective:     Vitals:    04/28/17 1205   BP: 116/75   Pulse: 99   Resp: 16   Temp: 98.6 °F (37 °C)   TempSrc: Oral   SpO2: 98%   Weight: 168 lb (76.2 kg)   Height: 5' 3\" (1.6 m)       Physical Examination: General appearance - alert, well appearing, and in no distress  Eyes - pupils equal and reactive, extraocular eye movements intact  Ears - bilateral TM's and external ear canals normal  Nose - normal and patent, no erythema, discharge or polyps  Mouth - mucous membranes moist, pharynx normal without lesions  Neck - supple, no significant adenopathy  Chest - clear to auscultation, no wheezes, rales or rhonchi, symmetric air entry  Heart - normal rate, regular rhythm, normal S1, S2, no murmurs, rubs, clicks or gallops  Abdomen - soft, nontender, nondistended, no masses or organomegaly  Neurological - alert, oriented, normal speech, no focal findings or movement disorder noted  Musculoskeletal - no joint tenderness, deformity or swelling  Extremities - peripheral pulses normal, no pedal edema, no clubbing or cyanosis  Skin - normal coloration and turgor, no rashes, no suspicious skin lesions noted    Assessment/ Plan:   Magdalene Cronin was seen today for breathing problem and hospital follow up. Diagnoses and all orders for this visit:    SOB (shortness of breath)  -     REFERRAL TO EMERGENCY DEPARTMENT    Other orders  -     Cancel: tiZANidine (ZANAFLEX) 4 mg tablet; Take 1 Tab by mouth every eight (8) hours as needed. Exam is normal, patient desaturated once to 82 percent but when asked to take a deep breath it returned to 100 percent   I recommend that she see a pulmonologist and have referred twice but she states that she has not gone. I think a CT chest is reasonable since she has desaturated once. If normal will need to go to psychiatry for panic attacks. Follow-up Disposition: Not on File    I have discussed the diagnosis with the patient and the intended plan as seen in the above orders. The patient has received an after-visit summary and questions were answered concerning future plans. Medication Side Effects and Warnings were discussed with patient: yes  Patient Labs were reviewed and or requested: yes  Patient Past Records were reviewed and or requested  yes  I have discussed the diagnosis with the patient and the intended plan as seen in the above orders. The patient has received an after-visit summary and questions were answered concerning future plans. Pt agrees to call or return to clinic and/or go to closest ER with any worsening of symptoms. This may include, but not limited to increased fever (>100.4) with NSAIDS or Tylenol, increased edema, confusion, rash, worsening of presenting symptoms. 1) Remember to stay active and/or exercise regularly (I suggest 30-45 minutes daily)   2) For reliable dietary information, go to www. EATRIGHT.org. You may wish to consider seeing the nutritionist at [facility] at #[phone] or [phone], also consider the [address] [facility].   3) I routinely suggest a complete physical exam once each year (your birth month)

## 2017-04-28 NOTE — MR AVS SNAPSHOT
Visit Information Date & Time Provider Department Dept. Phone Encounter #  
 4/28/2017 10:45 AM [facility] [phone] [id number] Follow-up Instructions Return if symptoms worsen or fail to improve. Follow-up and Disposition History Your Appointments 5/16/2017  2:15 PM  
Follow Up with [facility] DO [facility] [address]) Appt Note: 3 month F/U. ..gait/$0CP. Mayi Kee 12/23/16. .rw; f/u gait 3/23/2017 [address] [place] [address] [facility] [address]  
[phone]  
  
   
 [address] [facility] [address] [facility]  [address] Upcoming Health Maintenance Date Due  
 PAP AKA CERVICAL CYTOLOGY 8/20/1974 BREAST CANCER SCRN MAMMOGRAM 8/31/2013 DTaP/Tdap/Td series (1 - Tdap) 7/20/2016 INFLUENZA AGE 9 TO ADULT 8/1/2016 FOBT Q 1 YEAR AGE 50-75 7/19/2017 Allergies as of 4/28/2017  Review Complete On: 4/28/2017 By: Ernestine Fallon, RT No Known Allergies Current Immunizations  Never Reviewed Name Date Td, Adsorbed PF 7/19/2016 Not reviewed this visit You Were Diagnosed With   
  
 Codes Comments SOB (shortness of breath)    -  Primary ICD-10-CM: R06.02 
ICD-9-CM: 786.05 Vitals BP Pulse Temp Resp Height(growth percentile) Weight(growth percentile) 116/75 (BP 1 Location: Right arm, BP Patient Position: Sitting) 99 98.6 °F (37 °C) (Oral) 16 5' 3\" (1.6 m) 168 lb (76.2 kg) SpO2 BMI OB Status Smoking Status 98% 29.76 kg/m2 Hysterectomy Former Smoker Vitals History BMI and BSA Data Body Mass Index Body Surface Area  
 29.76 kg/m 2 1.84 m 2 Preferred Pharmacy Pharmacy Name Phone [facility] [phone] Your Updated Medication List  
  
   
 This list is accurate as of: 4/28/17  1:36 PM.  Always use your most recent med list.  
  
  
  
  
 bisacodyl 10 mg suppository Commonly known as:  DULCOLAX Insert 10 mg into rectum daily as needed. docusate sodium 100 mg capsule Commonly known as:  Jayde Constant Take 1 Cap by mouth daily for 90 days. pantoprazole 40 mg tablet Commonly known as:  PROTONIX Take 1 Tab by mouth daily. REBIF REBIDOSE 44 mcg/0.5 mL Pnij Generic drug:  interferon beta-1a (albumin) 0.5 mL by IntraMUSCular route. sertraline 50 mg tablet Commonly known as:  ZOLOFT Take 50 mg by mouth daily. tiZANidine 4 mg tablet Commonly known as:  Robert Cave Take 1 Tab by mouth every eight (8) hours as needed. We Performed the Following REFERRAL TO EMERGENCY DEPARTMENT [ZYA237 Custom] Comments:  
 Please evaluate patient for sob. Follow-up Instructions Return if symptoms worsen or fail to improve. Referral Information Referral ID Referred By Referred To  
  
 [id number] Paul WARE Not Available Visits Status Start Date End Date 1 New Request 4/28/17 4/28/18 If your referral has a status of pending review or denied, additional information will be sent to support the outcome of this decision. Introducing [facility]! Cori Reid introduces [employer] patient portal. Now you can access parts of your medical record, email your doctor's office, and request medication refills online. 1. In your internet browser, go to [email or web address] 2. Click on the First Time User? Click Here link in the Sign In box. You will see the New Member Sign Up page. 3. Enter your [employer] Access Code exactly as it appears below. You will not need to use this code after youve completed the sign-up process. If you do not sign up before the expiration date, you must request a new code.  
 
· [employer] Access Code: [email or web address] 
 Expires: 6/17/2017  8:54 AM 
 
4. Enter the last four digits of your Social Security Number (xxxx) and Date of Birth (mm/dd/yyyy) as indicated and click Submit. You will be taken to the next sign-up page. 5. Create a [employer] ID. This will be your [employer] login ID and cannot be changed, so think of one that is secure and easy to remember. 6. Create a [employer] password. You can change your password at any time. 7. Enter your Password Reset Question and Answer. This can be used at a later time if you forget your password. 8. Enter your e-mail address. You will receive e-mail notification when new information is available in [address] 9. Click Sign Up. You can now view and download portions of your medical record. 10. Click the Download Summary menu link to download a portable copy of your medical information. If you have questions, please visit the Frequently Asked Questions section of the [employer] website. Remember, [employer] is NOT to be used for urgent needs. For medical emergencies, dial 911. Now available from your iPhone and Android! Please provide this summary of care documentation to your next provider. Your primary care clinician is listed as Angel Portillo. If you have any questions after today's visit, please call [phone].

## 2017-04-28 NOTE — ED NOTES
Patient verbalizes understanding of discharge instructions. Patient wheeled to waiting room in no acute distress at discharge.

## 2017-05-05 RX ORDER — SERTRALINE HYDROCHLORIDE 50 MG/1
50 TABLET, FILM COATED ORAL DAILY
Qty: 30 TAB | Refills: 3 | Status: SHIPPED | OUTPATIENT
Start: 2017-05-05 | End: 2017-07-07 | Stop reason: SDUPTHER

## 2017-05-05 RX ORDER — TIZANIDINE 4 MG/1
4 TABLET ORAL
Qty: 60 TAB | Refills: 0 | Status: SHIPPED | OUTPATIENT
Start: 2017-05-05 | End: 2017-07-07 | Stop reason: SDUPTHER

## 2017-07-06 NOTE — TELEPHONE ENCOUNTER
Pt called requesting refills for sertraline (ZOLOFT) 50 mg tablet and  tiZANidine (ZANAFLEX) 4 mg tablet.  Pt can be reached @ [phone]

## 2017-07-07 RX ORDER — TIZANIDINE 4 MG/1
4 TABLET ORAL
Qty: 60 TAB | Refills: 0 | Status: SHIPPED | OUTPATIENT
Start: 2017-07-07 | End: 2018-01-26 | Stop reason: SDUPTHER

## 2017-07-07 RX ORDER — SERTRALINE HYDROCHLORIDE 50 MG/1
50 TABLET, FILM COATED ORAL DAILY
Qty: 30 TAB | Refills: 3 | Status: SHIPPED | OUTPATIENT
Start: 2017-07-07 | End: 2017-11-15 | Stop reason: SDUPTHER

## 2017-08-24 RX ORDER — TIZANIDINE 4 MG/1
TABLET ORAL
Qty: 90 TAB | Refills: 0 | Status: SHIPPED | OUTPATIENT
Start: 2017-08-24 | End: 2017-11-15 | Stop reason: SDUPTHER

## 2017-11-15 RX ORDER — TIZANIDINE 4 MG/1
TABLET ORAL
Qty: 90 TAB | Refills: 0 | Status: ON HOLD | OUTPATIENT
Start: 2017-11-15 | End: 2018-08-23 | Stop reason: SDUPTHER

## 2017-11-15 RX ORDER — SERTRALINE HYDROCHLORIDE 50 MG/1
50 TABLET, FILM COATED ORAL DAILY
Qty: 30 TAB | Refills: 3 | Status: ON HOLD | OUTPATIENT
Start: 2017-11-15 | End: 2018-08-23

## 2017-11-30 ENCOUNTER — OFFICE VISIT (OUTPATIENT)
Dept: INTERNAL MEDICINE CLINIC | Age: 62
End: 2017-11-30

## 2017-11-30 VITALS
HEIGHT: 63 IN | WEIGHT: 156 LBS | HEART RATE: 97 BPM | DIASTOLIC BLOOD PRESSURE: 81 MMHG | BODY MASS INDEX: 27.64 KG/M2 | OXYGEN SATURATION: 95 % | SYSTOLIC BLOOD PRESSURE: 121 MMHG

## 2017-11-30 DIAGNOSIS — L60.8 NAIL DISCOLORATION: ICD-10-CM

## 2017-11-30 DIAGNOSIS — R35.0 URINARY FREQUENCY: ICD-10-CM

## 2017-11-30 DIAGNOSIS — G35 MULTIPLE SCLEROSIS (HCC): ICD-10-CM

## 2017-11-30 DIAGNOSIS — R30.0 DYSURIA: ICD-10-CM

## 2017-11-30 DIAGNOSIS — Z12.11 COLON CANCER SCREENING: ICD-10-CM

## 2017-11-30 DIAGNOSIS — R06.02 SOB (SHORTNESS OF BREATH): Primary | ICD-10-CM

## 2017-11-30 DIAGNOSIS — Z12.39 BREAST CANCER SCREENING: ICD-10-CM

## 2017-11-30 NOTE — PROGRESS NOTES
Chief Complaint   Patient presents with    Nail Problem     bilateral discolored toes x 2 weeks    Breathing Problem     trouble breath x 1 year     she is a 57y.o. year old female who presents for evaluation of Shortness of Breath and Bilateral discoloration in toes. No pain per patient. Patient states her shortness of breath has been worked up by the ER twice with CT scans and x-rays with normal results. She was referred to pulmonology a few times but has not been able to make that appointment. She states that her anxiety medication Xanax has worked for in the past to calm her breathing down. Denies any current shortness of breath DOMINGUEZ or chest pain  Patient also states that she had a worsening colored discoloration of her nailbeds of all her toes. States they have turned black in color and believes is due to her lack of blood supply. States she is also having trouble getting to doctors offices because of right situation, will send her to nurse navigator for more help with this. Reviewed and agree with Nurse Note and duplicated in this note. Reviewed PmHx, RxHx, FmHx, SocHx, AllgHx and updated and dated in the chart. No family history on file.     Past Medical History:   Diagnosis Date    Multiple sclerosis ([facility] [address]     Neurological disorder     Multiple sclerosis, in remission 2011    Other ill-defined conditions     Mitral Value Prolapse      Social History     Social History    Marital status: SINGLE     Spouse name: N/A    Number of children: N/A    Years of education: N/A     Social History Main Topics    Smoking status: Former Smoker    Smokeless tobacco: Not on file    Alcohol use Yes      Comment: social    Drug use: Yes     Special: Marijuana      Comment: occasional marijuana    Sexual activity: Not Currently     Other Topics Concern    Not on file     Social History Narrative        Review of Systems - negative except as listed above      Objective:     Vitals:    11/30/17 1419 BP: 121/81   Pulse: 97   SpO2: 95%   Weight: 156 lb (70.8 kg)   Height: 5' 3\" (1.6 m)       Physical Examination: General appearance - alert, well appearing, and in no distress  Eyes - pupils equal and reactive, extraocular eye movements intact  Ears - bilateral TM's and external ear canals normal  Nose - normal and patent, no erythema, discharge or polyps  Mouth - mucous membranes moist, pharynx normal without lesions  Neck - supple, no significant adenopathy  Chest - clear to auscultation, no wheezes, rales or rhonchi, symmetric air entry  Heart - normal rate, regular rhythm, normal S1, S2, no murmurs, rubs, clicks or gallops  Abdomen - soft, nontender, nondistended, no masses or organomegaly  Neurological - alert, oriented, normal speech, no focal findings or movement disorder noted  Musculoskeletal - no joint tenderness, deformity or swelling  Extremities - peripheral pulses normal, no pedal edema, no clubbing or cyanosis  Skin - NAILS: onychomycosis of the toenails    Assessment/ Plan:   Diagnoses and all orders for this visit:    1. SOB (shortness of breath)  -     [facility]    2. Breast cancer screening  -     [facility] MAMMO BI SCREENING INCL CAD; Future    3. Colon cancer screening  -     OCCULT BLOOD, IMMUNOASSAY (FIT)    4. Multiple sclerosis ([facility] [address]    5. Nail discoloration  -     REFERRAL TO PODIATRY    6. Dysuria  -     AMB POC URINALYSIS DIP STICK AUTO W/O MICRO  -     [facility]    7. Urinary frequency  -     [facility]       Follow-up Disposition: Not on File    1) Remember to stay active and/or exercise regularly (I suggest 30-45 minutes daily)   2) For reliable dietary information, go to www. EATRIGHT.org. You may wish to consider seeing the nutritionist at [facility] [phone], also consider the [address]   3) I routinely suggest a complete physical exam once each year (your birth month)  I have discussed the diagnosis with the patient and the intended plan as seen in the above orders. The patient has received an after-visit summary and questions were answered concerning future plans. Medication Side Effects and Warnings were discussed with patient: yes  Patient Labs were reviewed and or requested: yes  Patient Past Records were reviewed and or requested  yes  I have discussed the diagnosis with the patient and the intended plan as seen in the above orders. Pt agrees to call or return to clinic and/or go to closest ER with any worsening of symptoms. This may include, but not limited to increased fever (>100.4) with NSAIDS or Tylenol, increased edema, confusion, rash, worsening of presenting symptoms.

## 2017-11-30 NOTE — MR AVS SNAPSHOT
Visit Information Date & Time Provider Department Dept. Phone Encounter #  
 11/30/2017  2:30 PM [facility] MD Ot[place] [address] [place] [address] Sports Medicine and [place] [id number] Follow-up Instructions Return if symptoms worsen or fail to improve. Follow-up and Disposition History Upcoming Health Maintenance Date Due  
 PAP AKA CERVICAL CYTOLOGY 8/20/1974 BREAST CANCER SCRN MAMMOGRAM 8/31/2013 DTaP/Tdap/Td series (1 - Tdap) 7/20/2016 FOBT Q 1 YEAR AGE 50-75 7/19/2017 Influenza Age 5 to Adult 8/1/2017 Allergies as of 11/30/2017  Review Complete On: 11/30/2017 By: [facility] MD [place]  
 No Known Allergies Current Immunizations  Never Reviewed Name Date Td, Adsorbed PF 7/19/2016 Not reviewed this visit You Were Diagnosed With   
  
 Codes Comments SOB (shortness of breath)    -  Primary ICD-10-CM: R06.02 
ICD-9-CM: 786.05 Breast cancer screening     ICD-10-CM: Z12.31 
ICD-9-CM: V76.10 Colon cancer screening     ICD-10-CM: Z12.11 ICD-9-CM: V76.51 [facility] ([facility]     ICD-10-CM: G35 
ICD-9-CM: 977 Nail discoloration     ICD-10-CM: L60.8 ICD-9-CM: 703.8 Dysuria     ICD-10-CM: R30.0 ICD-9-CM: 788.1 Urinary frequency     ICD-10-CM: R35.0 ICD-9-CM: 788.41 Vitals BP Pulse Height(growth percentile) Weight(growth percentile) SpO2 BMI  
 121/81 (BP 1 Location: Right arm, BP Patient Position: Sitting) 97 5' 3\" (1.6 m) 156 lb (70.8 kg) 95% 27.63 kg/m2 OB Status Smoking Status Hysterectomy Former Smoker Vitals History BMI and BSA Data Body Mass Index Body Surface Area  
 27.63 kg/m 2 1.77 m 2 Preferred Pharmacy Pharmacy Name Phone Josh Byrd [id number] [facility] [phone] Your Updated Medication List  
  
   
This list is accurate as of: 11/30/17  3:23 PM.  Always use your most recent med list.  
  
 bisacodyl 10 mg suppository Commonly known as:  DULCOLAX Insert 10 mg into rectum daily as needed. pantoprazole 40 mg tablet Commonly known as:  PROTONIX Take 1 Tab by mouth daily. REBIF REBIDOSE 44 mcg/0.5 mL Pnij Generic drug:  interferon beta-1a (albumin) 0.5 mL by IntraMUSCular route. sertraline 50 mg tablet Commonly known as:  ZOLOFT Take 1 Tab by mouth daily. * tiZANidine 4 mg tablet Commonly known as:  Elenore Piggs Take 1 Tab by mouth every eight (8) hours as needed. * tiZANidine 4 mg tablet Commonly known as:  Elenore Piggs Take 1 tablet every 8 hours prn * Notice: This list has 2 medication(s) that are the same as other medications prescribed for you. Read the directions carefully, and ask your doctor or other care provider to review them with you. We Performed the Following AMB POC URINALYSIS DIP STICK AUTO W/O MICRO [89793 CPT(R)] OCCULT BLOOD, IMMUNOASSAY (FIT) [id number] CPT(R)] REFERRAL TO PODIATRY [REF90 Custom] REFERRAL TO PULMONARY DISEASE [JPV01 Custom] REFERRAL TO UROLOGY [MKJ110 Custom] Follow-up Instructions Return if symptoms worsen or fail to improve. To-Do List   
 11/30/2017 Imaging:  FRANCHESCA MAMMO BI SCREENING INCL CAD Referral Information Referral ID Referred By Referred To  
  
 [id number] [facility] [address] [place] [address] [place] [address] [facility] Visits Status Start Date End Date 1 New Request 11/30/17 11/30/18 If your referral has a status of pending review or denied, additional information will be sent to support the outcome of this decision. Referral ID Referred By Referred To  
 [id number] ELE[place]ERIO, [address] JERICA Marcus  
   [address] [place] [address] [place], [address] Visits Status Start Date End Date 1 New Request 11/30/17 11/30/18 If your referral has a status of pending review or denied, additional information will be sent to support the outcome of this decision. Referral ID Referred By Referred To  
 [id number] Alice Murillo MD  
   [facility] [address] [facility], [address] [place] [address] Avenue Phone: [phone] Fax: [phone] Visits Status Start Date End Date 1 New Request 11/30/17 11/30/18 If your referral has a status of pending review or denied, additional information will be sent to support the outcome of this decision. Introducing [facility] [place] [facility]! Yariel Delarosa introduces [employer] patient portal. Now you can access parts of your medical record, email your doctor's office, and request medication refills online. 1. In your internet browser, go to [email or web address] 2. Click on the First Time User? Click Here link in the Sign In box. You will see the New Member Sign Up page. 3. Enter your [employer] Access Code exactly as it appears below. You will not need to use this code after youve completed the sign-up process. If you do not sign up before the expiration date, you must request a new code. · [employer] Access Code: [email or web address] Expires: 2/28/2018  3:23 PM 
 
4. Enter the last four digits of your Social Security Number (xxxx) and Date of Birth (mm/dd/yyyy) as indicated and click Submit. You will be taken to the next sign-up page. 5. Create a [employer] ID. This will be your [employer] login ID and cannot be changed, so think of one that is secure and easy to remember. 6. Create a [employer] password. You can change your password at any time. 7. Enter your Password Reset Question and Answer. This can be used at a later time if you forget your password. 8. Enter your e-mail address. You will receive e-mail notification when new information is available in [address] 9. Click Sign Up. You can now view and download portions of your medical record. 10. Click the Download Summary menu link to download a portable copy of your medical information. If you have questions, please visit the Frequently Asked Questions section of the [employer] website. Remember, [employer] is NOT to be used for urgent needs. For medical emergencies, dial 911. Now available from your iPhone and Android! Please provide this summary of care documentation to your next provider. Your primary care clinician is listed as Amilcar Gambino. If you have any questions after today's visit, please call [phone].

## 2017-12-05 LAB
BILIRUB UR QL STRIP: NEGATIVE
GLUCOSE UR-MCNC: NEGATIVE MG/DL
KETONES P FAST UR STRIP-MCNC: NEGATIVE MG/DL
PH UR STRIP: 6 [PH] (ref 4.6–8)
PROT UR QL STRIP: NEGATIVE
SP GR UR STRIP: 1.02 (ref 1–1.03)
UA UROBILINOGEN AMB POC: NORMAL (ref 0.2–1)
URINALYSIS CLARITY POC: CLEAR
URINALYSIS COLOR POC: YELLOW
URINE BLOOD POC: NEGATIVE
URINE LEUKOCYTES POC: NEGATIVE
URINE NITRITES POC: NEGATIVE

## 2017-12-12 LAB — HEMOCCULT STL QL IA: NEGATIVE

## 2018-01-26 RX ORDER — TIZANIDINE 4 MG/1
4 TABLET ORAL
Qty: 60 TAB | Refills: 0 | Status: SHIPPED | OUTPATIENT
Start: 2018-01-26 | End: 2018-03-15 | Stop reason: SDUPTHER

## 2018-03-15 RX ORDER — TIZANIDINE 4 MG/1
4 TABLET ORAL
Qty: 60 TAB | Refills: 0 | Status: SHIPPED | OUTPATIENT
Start: 2018-03-15 | End: 2018-05-08 | Stop reason: SDUPTHER

## 2018-05-09 RX ORDER — TIZANIDINE 4 MG/1
TABLET ORAL
Qty: 60 TAB | Refills: 0 | Status: SHIPPED | OUTPATIENT
Start: 2018-05-09 | End: 2018-06-29 | Stop reason: SDUPTHER

## 2018-05-29 RX ORDER — SERTRALINE HYDROCHLORIDE 50 MG/1
TABLET, FILM COATED ORAL
Qty: 30 TAB | Refills: 2 | Status: SHIPPED | OUTPATIENT
Start: 2018-05-29 | End: 2018-10-18 | Stop reason: SDUPTHER

## 2018-06-29 RX ORDER — TIZANIDINE 4 MG/1
TABLET ORAL
Qty: 60 TAB | Refills: 0 | Status: SHIPPED | OUTPATIENT
Start: 2018-06-29 | End: 2018-07-27 | Stop reason: SDUPTHER

## 2018-07-28 RX ORDER — TIZANIDINE 4 MG/1
TABLET ORAL
Qty: 60 TAB | Refills: 0 | Status: SHIPPED | OUTPATIENT
Start: 2018-07-28 | End: 2018-08-03 | Stop reason: SDUPTHER

## 2018-08-03 RX ORDER — TIZANIDINE 4 MG/1
TABLET ORAL
Qty: 60 TAB | Refills: 0 | Status: SHIPPED | OUTPATIENT
Start: 2018-08-03 | End: 2018-11-28 | Stop reason: SDUPTHER

## 2018-08-20 ENCOUNTER — HOSPITAL ENCOUNTER (OUTPATIENT)
Age: 63
Setting detail: OBSERVATION
Discharge: NURSING, REHAB OR LONG-TERM CARE | End: 2018-08-23
Attending: EMERGENCY MEDICINE | Admitting: INTERNAL MEDICINE
Payer: MEDICARE

## 2018-08-20 ENCOUNTER — TELEPHONE (OUTPATIENT)
Dept: NEUROLOGY | Age: 63
End: 2018-08-20

## 2018-08-20 ENCOUNTER — APPOINTMENT (OUTPATIENT)
Dept: GENERAL RADIOLOGY | Age: 63
End: 2018-08-20
Attending: EMERGENCY MEDICINE
Payer: MEDICARE

## 2018-08-20 DIAGNOSIS — S82.401A CLOSED FRACTURE OF BOTH FIBULAE, INITIAL ENCOUNTER: Primary | ICD-10-CM

## 2018-08-20 DIAGNOSIS — S82.402A CLOSED FRACTURE OF BOTH FIBULAE, INITIAL ENCOUNTER: Primary | ICD-10-CM

## 2018-08-20 PROBLEM — R26.2 INABILITY TO AMBULATE DUE TO LEFT ANKLE OR FOOT: Status: ACTIVE | Noted: 2018-08-20

## 2018-08-20 PROBLEM — S82.832A CLOSED FRACTURE OF PROXIMAL END OF LEFT FIBULA: Status: ACTIVE | Noted: 2018-08-20

## 2018-08-20 LAB
ALBUMIN SERPL-MCNC: 3.7 G/DL (ref 3.5–5)
ALBUMIN/GLOB SERPL: 0.9 {RATIO} (ref 1.1–2.2)
ALP SERPL-CCNC: 67 U/L (ref 45–117)
ALT SERPL-CCNC: 21 U/L (ref 12–78)
ANION GAP SERPL CALC-SCNC: 8 MMOL/L (ref 5–15)
AST SERPL-CCNC: 21 U/L (ref 15–37)
BASOPHILS # BLD: 0 K/UL (ref 0–0.1)
BASOPHILS NFR BLD: 0 % (ref 0–1)
BILIRUB SERPL-MCNC: 0.4 MG/DL (ref 0.2–1)
BUN SERPL-MCNC: 9 MG/DL (ref 6–20)
BUN/CREAT SERPL: 13 (ref 12–20)
CALCIUM SERPL-MCNC: 9 MG/DL (ref 8.5–10.1)
CHLORIDE SERPL-SCNC: 106 MMOL/L (ref 97–108)
CO2 SERPL-SCNC: 28 MMOL/L (ref 21–32)
COMMENT, HOLDF: NORMAL
CREAT SERPL-MCNC: 0.67 MG/DL (ref 0.55–1.02)
DIFFERENTIAL METHOD BLD: NORMAL
EOSINOPHIL # BLD: 0 K/UL (ref 0–0.4)
EOSINOPHIL NFR BLD: 0 % (ref 0–7)
ERYTHROCYTE [DISTWIDTH] IN BLOOD BY AUTOMATED COUNT: 14 % (ref 11.5–14.5)
GLOBULIN SER CALC-MCNC: 4.3 G/DL (ref 2–4)
GLUCOSE SERPL-MCNC: 78 MG/DL (ref 65–100)
HCT VFR BLD AUTO: 44 % (ref 35–47)
HGB BLD-MCNC: 13.9 G/DL (ref 11.5–16)
IMM GRANULOCYTES # BLD: 0 K/UL (ref 0–0.04)
IMM GRANULOCYTES NFR BLD AUTO: 0 % (ref 0–0.5)
LYMPHOCYTES # BLD: 1.5 K/UL (ref 0.8–3.5)
LYMPHOCYTES NFR BLD: 17 % (ref 12–49)
MCH RBC QN AUTO: 27.5 PG (ref 26–34)
MCHC RBC AUTO-ENTMCNC: 31.6 G/DL (ref 30–36.5)
MCV RBC AUTO: 87 FL (ref 80–99)
MONOCYTES # BLD: 0.6 K/UL (ref 0–1)
MONOCYTES NFR BLD: 7 % (ref 5–13)
NEUTS SEG # BLD: 6.8 K/UL (ref 1.8–8)
NEUTS SEG NFR BLD: 75 % (ref 32–75)
NRBC # BLD: 0 K/UL (ref 0–0.01)
NRBC BLD-RTO: 0 PER 100 WBC
PLATELET # BLD AUTO: 319 K/UL (ref 150–400)
PMV BLD AUTO: 10.5 FL (ref 8.9–12.9)
POTASSIUM SERPL-SCNC: 4.4 MMOL/L (ref 3.5–5.1)
PROT SERPL-MCNC: 8 G/DL (ref 6.4–8.2)
RBC # BLD AUTO: 5.06 M/UL (ref 3.8–5.2)
SAMPLES BEING HELD,HOLD: NORMAL
SODIUM SERPL-SCNC: 142 MMOL/L (ref 136–145)
WBC # BLD AUTO: 9.1 K/UL (ref 3.6–11)

## 2018-08-20 PROCEDURE — 85025 COMPLETE CBC W/AUTO DIFF WBC: CPT | Performed by: EMERGENCY MEDICINE

## 2018-08-20 PROCEDURE — 74011250636 HC RX REV CODE- 250/636: Performed by: HOSPITALIST

## 2018-08-20 PROCEDURE — 74011250637 HC RX REV CODE- 250/637: Performed by: HOSPITALIST

## 2018-08-20 PROCEDURE — 73562 X-RAY EXAM OF KNEE 3: CPT

## 2018-08-20 PROCEDURE — 73600 X-RAY EXAM OF ANKLE: CPT

## 2018-08-20 PROCEDURE — 65270000029 HC RM PRIVATE

## 2018-08-20 PROCEDURE — 80053 COMPREHEN METABOLIC PANEL: CPT | Performed by: EMERGENCY MEDICINE

## 2018-08-20 PROCEDURE — 36415 COLL VENOUS BLD VENIPUNCTURE: CPT | Performed by: EMERGENCY MEDICINE

## 2018-08-20 PROCEDURE — 74011250636 HC RX REV CODE- 250/636: Performed by: EMERGENCY MEDICINE

## 2018-08-20 PROCEDURE — 99283 EMERGENCY DEPT VISIT LOW MDM: CPT

## 2018-08-20 PROCEDURE — 96372 THER/PROPH/DIAG INJ SC/IM: CPT

## 2018-08-20 PROCEDURE — 73502 X-RAY EXAM HIP UNI 2-3 VIEWS: CPT

## 2018-08-20 PROCEDURE — 73610 X-RAY EXAM OF ANKLE: CPT

## 2018-08-20 PROCEDURE — 96374 THER/PROPH/DIAG INJ IV PUSH: CPT

## 2018-08-20 PROCEDURE — 75810000053 HC SPLINT APPLICATION

## 2018-08-20 PROCEDURE — 65390000012 HC CONDITION CODE 44 OBSERVATION

## 2018-08-20 RX ORDER — TIZANIDINE 2 MG/1
4 TABLET ORAL
Status: DISCONTINUED | OUTPATIENT
Start: 2018-08-20 | End: 2018-08-23 | Stop reason: HOSPADM

## 2018-08-20 RX ORDER — OXYCODONE AND ACETAMINOPHEN 5; 325 MG/1; MG/1
1 TABLET ORAL
Status: DISCONTINUED | OUTPATIENT
Start: 2018-08-20 | End: 2018-08-23 | Stop reason: HOSPADM

## 2018-08-20 RX ORDER — FACIAL-BODY WIPES
10 EACH TOPICAL
Status: DISCONTINUED | OUTPATIENT
Start: 2018-08-20 | End: 2018-08-23 | Stop reason: HOSPADM

## 2018-08-20 RX ORDER — SODIUM CHLORIDE 0.9 % (FLUSH) 0.9 %
5-10 SYRINGE (ML) INJECTION AS NEEDED
Status: DISCONTINUED | OUTPATIENT
Start: 2018-08-20 | End: 2018-08-23 | Stop reason: HOSPADM

## 2018-08-20 RX ORDER — SERTRALINE HYDROCHLORIDE 50 MG/1
50 TABLET, FILM COATED ORAL
Status: DISCONTINUED | OUTPATIENT
Start: 2018-08-20 | End: 2018-08-23 | Stop reason: HOSPADM

## 2018-08-20 RX ORDER — SERTRALINE HYDROCHLORIDE 50 MG/1
50 TABLET, FILM COATED ORAL DAILY
Status: DISCONTINUED | OUTPATIENT
Start: 2018-08-21 | End: 2018-08-20

## 2018-08-20 RX ORDER — SODIUM CHLORIDE 0.9 % (FLUSH) 0.9 %
5-10 SYRINGE (ML) INJECTION EVERY 8 HOURS
Status: DISCONTINUED | OUTPATIENT
Start: 2018-08-20 | End: 2018-08-23 | Stop reason: HOSPADM

## 2018-08-20 RX ORDER — ENOXAPARIN SODIUM 100 MG/ML
40 INJECTION SUBCUTANEOUS EVERY 24 HOURS
Status: DISCONTINUED | OUTPATIENT
Start: 2018-08-20 | End: 2018-08-23 | Stop reason: HOSPADM

## 2018-08-20 RX ORDER — MORPHINE SULFATE 10 MG/ML
4 INJECTION, SOLUTION INTRAMUSCULAR; INTRAVENOUS
Status: COMPLETED | OUTPATIENT
Start: 2018-08-20 | End: 2018-08-20

## 2018-08-20 RX ORDER — PANTOPRAZOLE SODIUM 40 MG/1
40 TABLET, DELAYED RELEASE ORAL
Status: DISCONTINUED | OUTPATIENT
Start: 2018-08-21 | End: 2018-08-23 | Stop reason: HOSPADM

## 2018-08-20 RX ORDER — HYDROCODONE BITARTRATE AND ACETAMINOPHEN 5; 325 MG/1; MG/1
1 TABLET ORAL
Qty: 20 TAB | Refills: 0 | Status: SHIPPED | OUTPATIENT
Start: 2018-08-20 | End: 2018-08-23

## 2018-08-20 RX ADMIN — SERTRALINE HYDROCHLORIDE 50 MG: 50 TABLET ORAL at 22:59

## 2018-08-20 RX ADMIN — Medication 10 ML: at 21:01

## 2018-08-20 RX ADMIN — MORPHINE SULFATE 4 MG: 10 INJECTION INTRAVENOUS at 14:05

## 2018-08-20 RX ADMIN — TIZANIDINE 4 MG: 2 TABLET ORAL at 22:59

## 2018-08-20 RX ADMIN — OXYCODONE HYDROCHLORIDE AND ACETAMINOPHEN 1 TABLET: 5; 325 TABLET ORAL at 18:34

## 2018-08-20 RX ADMIN — ENOXAPARIN SODIUM 40 MG: 100 INJECTION SUBCUTANEOUS at 21:01

## 2018-08-20 NOTE — CONSULTS
FRACTURE CONSULT NOTE    Subjective:     Date of Consultation:  August 20, 2018  Referring Physician:  Dr. Irene Mathew is a 70 y.o. female who is being seen for left lower leg. Injury occurred  2 week(s) ago. She has MS and has limited use of the right leg. She states she thought that it was only a bruise but failed to improve so came to the ER. Workup has revealed left proximal fibula fracture. Patient Active Problem List    Diagnosis Date Noted    Closed fracture of proximal end of left fibula 08/20/2018    Inability to ambulate due to left ankle or foot 08/20/2018    Multiple sclerosis exacerbation ([facility] 02/24/2017     History reviewed. No pertinent family history. Social History   Substance Use Topics    Smoking status: Former Smoker    Smokeless tobacco: Not on file    Alcohol use Yes      Comment: social     Past Medical History:   Diagnosis Date    [facility] sclerosis ([facility]     Neurological disorder     Multiple sclerosis, in remission 2011    Other ill-defined conditions(799.89)     Mitral Value Prolapse      Past Surgical History:   Procedure Laterality Date    ABDOMEN SURGERY PROC UNLISTED      hernia repair    HX HYSTERECTOMY        Prior to Admission medications    Medication Sig Start Date End Date Taking? Authorizing Provider   HYDROcodone-acetaminophen (NORCO) 5-325 mg per tablet Take 1 Tab by mouth every four (4) hours as needed for Pain. Max Daily Amount: 6 Tabs. 8/20/18  Yes Desi Real MD   tiZANidine (ZANAFLEX) 4 mg tablet TAKE ONE TABLET BY MOUTH EVERY 8 HOURS AS NEEDED 8/3/18   Celia Gallego MD   sertraline (ZOLOFT) 50 mg tablet TAKE ONE TABLET BY MOUTH DAILY 5/29/18   Celia Gallego MD   tiZANidine (ZANAFLEX) 4 mg tablet Take 1 tablet every 8 hours prn 11/15/17   Celia Gallego MD   sertraline (ZOLOFT) 50 mg tablet Take 1 Tab by mouth daily. 11/15/17   Celia Gallego MD   REBIF REBIDOSE 44 mcg/0.5 mL pnij 0.5 mL by IntraMUSCular route.  4/11/17   Historical Provider   pantoprazole (PROTONIX) 40 mg tablet Take 1 Tab by mouth daily. 3/3/17   Nayely Damico MD   bisacodyl (DULCOLAX) 10 mg suppository Insert 10 mg into rectum daily as needed. 3/1/17   Nayely Damico MD     Current Facility-Administered Medications   Medication Dose Route Frequency    oxyCODONE-acetaminophen (PERCOCET) 5-325 mg per tablet 1 Tab  1 Tab Oral Q4H PRN     Current Outpatient Prescriptions   Medication Sig    HYDROcodone-acetaminophen (NORCO) 5-325 mg per tablet Take 1 Tab by mouth every four (4) hours as needed for Pain. Max Daily Amount: 6 Tabs.  tiZANidine (ZANAFLEX) 4 mg tablet TAKE ONE TABLET BY MOUTH EVERY 8 HOURS AS NEEDED    sertraline (ZOLOFT) 50 mg tablet TAKE ONE TABLET BY MOUTH DAILY    tiZANidine (ZANAFLEX) 4 mg tablet Take 1 tablet every 8 hours prn    sertraline (ZOLOFT) 50 mg tablet Take 1 Tab by mouth daily.  REBIF REBIDOSE 44 mcg/0.5 mL pnij 0.5 mL by IntraMUSCular route.  pantoprazole (PROTONIX) 40 mg tablet Take 1 Tab by mouth daily.  bisacodyl (DULCOLAX) 10 mg suppository Insert 10 mg into rectum daily as needed. No Known Allergies     Review of Systems:    Negative for fevers, chills, nausea, vomiting, chest pain, shortness of breath, headaches.       Mental Status: no dementia    Objective:     Patient Vitals for the past 24 hrs:   Temp Pulse Resp BP SpO2   18 1759 98.1 °F (36.7 °C) (!) 106 16 110/64 94 %   18 1614 - 93 16 (!) 142/94 98 %   18 1339 98.3 °F (36.8 °C) 90 20 134/66 97 %       Temp (24hrs), Av.2 °F (36.8 °C), Min:98.1 °F (36.7 °C), Max:98.3 °F (36.8 °C)      Gen: NAD, A&Ox3  Resp: Non-labored breathing  CV: Extremities well perfused  Abd: soft, NT  RLE: shortened, rotated, did not range due to pain, skin intact, warm well perfused, SILT in al distributions L3-S1, palpable pedal pulses, no calf tenderness  LLE: no pain with ROM, no swelling about knee or ankle, skin intact, warm well perfused, SILT in al distributions L3-S1, palpable pedal pulses, no calf tenderness    Imaging Review: minimally displaced left proximal fibula fracture. Ankle x rays show no fractures and obvious widening of the medial clear space. Labs:   Recent Results (from the past 24 hour(s))   METABOLIC PANEL, COMPREHENSIVE    Collection Time: 08/20/18  1:58 PM   Result Value Ref Range    Sodium 142 136 - 145 mmol/L    Potassium 4.4 3.5 - 5.1 mmol/L    Chloride 106 97 - 108 mmol/L    CO2 28 21 - 32 mmol/L    Anion gap 8 5 - 15 mmol/L    Glucose 78 65 - 100 mg/dL    BUN 9 6 - 20 MG/DL    Creatinine 0.67 0.55 - 1.02 MG/DL    BUN/Creatinine ratio 13 12 - 20      GFR est AA >60 >60 ml/min/1.73m2    GFR est non-AA >60 >60 ml/min/1.73m2    Calcium 9.0 8.5 - 10.1 MG/DL    Bilirubin, total 0.4 0.2 - 1.0 MG/DL    ALT (SGPT) 21 12 - 78 U/L    AST (SGOT) 21 15 - 37 U/L    Alk. phosphatase 67 45 - 117 U/L    Protein, total 8.0 6.4 - 8.2 g/dL    Albumin 3.7 3.5 - 5.0 g/dL    Globulin 4.3 (H) 2.0 - 4.0 g/dL    A-G Ratio 0.9 (L) 1.1 - 2.2     CBC WITH AUTOMATED DIFF    Collection Time: 08/20/18  1:58 PM   Result Value Ref Range    WBC 9.1 3.6 - 11.0 K/uL    RBC 5.06 3.80 - 5.20 M/uL    HGB 13.9 11.5 - 16.0 g/dL    HCT 44.0 35.0 - 47.0 %    MCV 87.0 80.0 - 99.0 FL    MCH 27.5 26.0 - 34.0 PG    MCHC 31.6 30.0 - 36.5 g/dL    RDW 14.0 11.5 - 14.5 %    PLATELET 454 321 - 553 K/uL    MPV 10.5 8.9 - 12.9 FL    NRBC 0.0 0  WBC    ABSOLUTE NRBC 0.00 0.00 - 0.01 K/uL    NEUTROPHILS 75 32 - 75 %    LYMPHOCYTES 17 12 - 49 %    MONOCYTES 7 5 - 13 %    EOSINOPHILS 0 0 - 7 %    BASOPHILS 0 0 - 1 %    IMMATURE GRANULOCYTES 0 0.0 - 0.5 %    ABS. NEUTROPHILS 6.8 1.8 - 8.0 K/UL    ABS. LYMPHOCYTES 1.5 0.8 - 3.5 K/UL    ABS. MONOCYTES 0.6 0.0 - 1.0 K/UL    ABS. EOSINOPHILS 0.0 0.0 - 0.4 K/UL    ABS. BASOPHILS 0.0 0.0 - 0.1 K/UL    ABS. IMM.  GRANS. 0.0 0.00 - 0.04 K/UL    DF AUTOMATED     SAMPLES BEING HELD    Collection Time: 08/20/18  1:58 PM   Result Value Ref Range    SAMPLES BEING HELD 1RED 1BLUE     COMMENT        Add-on orders for these samples will be processed based on acceptable specimen integrity and analyte stability, which may vary by analyte. Current Facility-Administered Medications   Medication Dose Route Frequency    oxyCODONE-acetaminophen (PERCOCET) 5-325 mg per tablet 1 Tab  1 Tab Oral Q4H PRN     Current Outpatient Prescriptions   Medication Sig    HYDROcodone-acetaminophen (NORCO) 5-325 mg per tablet Take 1 Tab by mouth every four (4) hours as needed for Pain. Max Daily Amount: 6 Tabs.  tiZANidine (ZANAFLEX) 4 mg tablet TAKE ONE TABLET BY MOUTH EVERY 8 HOURS AS NEEDED    sertraline (ZOLOFT) 50 mg tablet TAKE ONE TABLET BY MOUTH DAILY    tiZANidine (ZANAFLEX) 4 mg tablet Take 1 tablet every 8 hours prn    sertraline (ZOLOFT) 50 mg tablet Take 1 Tab by mouth daily.  REBIF REBIDOSE 44 mcg/0.5 mL pnij 0.5 mL by IntraMUSCular route.  pantoprazole (PROTONIX) 40 mg tablet Take 1 Tab by mouth daily.  bisacodyl (DULCOLAX) 10 mg suppository Insert 10 mg into rectum daily as needed. Impression:     Principal Problem:    Closed fracture of proximal end of left fibula (8/20/2018)    Active Problems:    Inability to ambulate due to left ankle or foot (8/20/2018)        Plan:     Patient will be admitted to the medicine service for management of her co morbidities. This fracture is likely amenable to non operative treatment. She was placed in a splint by the emergency room providers. I did order a mortise view of the ankle with as much weight bearing as she can tolerate to rule out a syndesmosis injury. Although this is a sub acute fracture I'm not sure how much she has been stressing the leg and want to ensure that the talus does not displace. Will follow up on these x rays once complete. Ish Alcala MD      Addendum:    Weight bearing x ray reviewed. No evidence of medial clear space widening. Plan will be for conservative treatment. Transition to Tall Cam Walker boot. May WBAT in boot. F/U with Dr. Ed Andrews in 2 weeks for repeat imaging.

## 2018-08-20 NOTE — ED PROVIDER NOTES
HPI Comments: 70 y.o. female with past medical history significant for MS who presents from home via EMS with chief complaint of leg pain. Patient reports she fell about 2 weeks ago and did not present to ED or f/u with PCP. Since then, she has had left leg spasms with associated pain. She has taken Tylenol and muscle relaxer for the pain with temporary relief. Patient regularly uses a wheelchair and occasionally uses her walker for short distances. This morning, patient was getting ready for her appointment with her neurologist when she was unable to ambulate with her walker at all, prompting her to call EMS for assistance. Patient c/o nausea and chronic urinary frequency \"for a long time\". Pt notes her R leg is paralyzed at baseline, secondary to MS. Patient specifically denies fever, chills, new numbness, or tingling. There are no other acute medical concerns at this time. Social hx: Former tobacco smoker; Social EtOH use; Occasional marijuana use  PCP: Kristen Pendleton MD    Note written by Humberto Chong, as dictated by Katherin Meraz MD 1:46 PM    The history is provided by the patient. No  was used. Past Medical History:   Diagnosis Date    Multiple sclerosis ([facility] [facility] [address]     Neurological disorder     Multiple sclerosis, in remission 2011    Other ill-defined conditions(799.89)     Mitral Value Prolapse       Past Surgical History:   Procedure Laterality Date    ABDOMEN SURGERY PROC UNLISTED      hernia repair    HX HYSTERECTOMY           History reviewed. No pertinent family history. Social History     Social History    Marital status: SINGLE     Spouse name: N/A    Number of children: N/A    Years of education: N/A     Occupational History    Not on file.      Social History Main Topics    Smoking status: Former Smoker    Smokeless tobacco: Not on file    Alcohol use Yes      Comment: social    Drug use: Yes     Special: Marijuana      Comment: occasional marijuana    Sexual activity: Not Currently     Other Topics Concern    Not on file     Social History Narrative         ALLERGIES: Review of patient's allergies indicates no known allergies. Review of Systems   Constitutional: Negative for chills and fever. HENT: Negative for rhinorrhea and sore throat. Respiratory: Negative for cough and shortness of breath. Cardiovascular: Negative for chest pain. Gastrointestinal: Positive for nausea. Negative for abdominal pain, diarrhea and vomiting. Endocrine: Positive for polyuria. Genitourinary: Positive for frequency (Chronic). Negative for dysuria and urgency. Musculoskeletal: Positive for gait problem and myalgias. Negative for arthralgias and back pain. Skin: Negative for rash. Neurological: Negative for dizziness, tremors, weakness, light-headedness and numbness. All other systems reviewed and are negative. Vitals:    08/20/18 1339   BP: 134/66   Pulse: 90   Resp: 20   Temp: 98.3 °F (36.8 °C)   SpO2: 97%   Weight: 72 kg (158 lb 11.7 oz)   Height: 5' 3\" (1.6 m)            Physical Exam   Vital signs reviewed. Nursing notes reviewed. Const:  No acute distress, well developed, well nourished  Head:  Atraumatic, normocephalic  Eyes:  PERRL, conjunctiva normal, no scleral icterus  Neck:  Supple, trachea midline  Cardiovascular:  RRR, no murmurs, no gallops, no rubs  Resp:  No resp distress, no increased work of breathing, no wheezes, no rhonchi, no rales,  Abd:  Soft, non-tender, non-distended, no rebound, no guarding, no CVA tenderness  :  Deferred  MSK:  No pedal edema, mild diffuse tenderness to LLE, no obvious swelling, some spasms noted.    Neuro:  Alert and oriented x3, no cranial nerve defect  Skin:  Warm, dry, intact  Psych: normal mood and affect, behavior is normal, judgement and thought content is normal    Note written by Humberto Soto, as dictated by Tatiana Gregorio MD 1:46 PM     MDM  Number of Diagnoses or Management Options  Closed fracture of both fibulae, initial encounter:      Amount and/or Complexity of Data Reviewed  Clinical lab tests: ordered and reviewed  Tests in the radiology section of CPT®: ordered and reviewed  Review and summarize past medical records: yes    Patient Progress  Patient progress: stable    ED Course     Pt. Presents to the ER with complaints of left leg pain. Proximal fibular fx, likely from her fall a few weeks ago. Pt. Cannot walk or move herself to her wheelchair. Pt. Lives alone. Pt.  To be seen by ortho and to be evaluated for admission by the hospitalist.      Procedures

## 2018-08-20 NOTE — IP AVS SNAPSHOT
[address] [facility] [address] [place] [address] 
[phone] Patient: Virginia Fox MRN: [id number] [facility]:[birth year] A check anselmo indicates which time of day the medication should be taken. My Medications START taking these medications Instructions Each Dose to Equal  
 Morning Noon Evening Bedtime  
 oxyCODONE-acetaminophen 5-325 mg per tablet Commonly known as:  PERCOCET Your last dose was: Your next dose is: Take 1 Tab by mouth every four (4) hours as needed. Max Daily Amount: 6 Tabs. 1 Tab CHANGE how you take these medications Instructions Each Dose to Equal  
 Morning Noon Evening Bedtime  
 tiZANidine 4 mg tablet Commonly known as:  Gisselle Morgan What changed:  Another medication with the same name was removed. Continue taking this medication, and follow the directions you see here. Your last dose was: Your next dose is: TAKE ONE TABLET BY MOUTH EVERY 8 HOURS AS NEEDED CONTINUE taking these medications Instructions Each Dose to Equal  
 Morning Noon Evening Bedtime  
 bisacodyl 10 mg suppository Commonly known as:  DULCOLAX Your last dose was: Your next dose is: Insert 10 mg into rectum daily as needed. 10 mg  
    
   
   
   
  
 pantoprazole 40 mg tablet Commonly known as:  PROTONIX Your last dose was: Your next dose is: Take 1 Tab by mouth daily. 40 mg  
    
   
   
   
  
 sertraline 50 mg tablet Commonly known as:  ZOLOFT Your last dose was: Your next dose is: TAKE ONE TABLET BY MOUTH DAILY Where to Get Your Medications Information on where to get these meds will be given to you by the nurse or doctor. ! Ask your nurse or doctor about these medications oxyCODONE-acetaminophen 5-325 mg per tablet

## 2018-08-20 NOTE — IP AVS SNAPSHOT
[address] [address] 
[phone] Patient: Mohit Dumont MRN: [id number] SQP:[id number]/27/[id number] About your hospitalization You were admitted on:  August 20, 2018 You last received care in the:  [address] [facility] You were discharged on:  August 23, 2018 Why you were hospitalized Your primary diagnosis was:  Closed Fracture Of Proximal End Of Left Fibula Your diagnoses also included:  Inability To Ambulate Due To Left Ankle Or Foot Follow-up Information Follow up With Details Comments Contact Info Ashley Chavarria MD  or facility provider in 2 weeks [address] [address] 
[phone] [facility] EMERGENCY DEP  If symptoms worsen 200 [facility] [address] 
[phone] Alexa [facility]   [facility] [facility] 
[phone] Efrem Rogers MD In 2 weeks  [facility] [address] [facility] [address] 
[phone] Discharge Orders None A check anselmo indicates which time of day the medication should be taken. My Medications START taking these medications Instructions Each Dose to Equal  
 Morning Noon Evening Bedtime  
 oxyCODONE-acetaminophen 5-325 mg per tablet Commonly known as:  PERCOCET Your last dose was: Your next dose is: Take 1 Tab by mouth every four (4) hours as needed. Max Daily Amount: 6 Tabs. 1 Tab CHANGE how you take these medications Instructions Each Dose to Equal  
 Morning Noon Evening Bedtime  
 tiZANidine 4 mg tablet Commonly known as:  Nidhi Lebron What changed:  Another medication with the same name was removed. Continue taking this medication, and follow the directions you see here. Your last dose was: Your next dose is:  TAKE ONE TABLET BY MOUTH EVERY 8 HOURS AS NEEDED  
     
   
   
   
  
  
 CONTINUE taking these medications Instructions Each Dose to Equal  
 Morning Noon Evening Bedtime  
 bisacodyl 10 mg suppository Commonly known as:  DULCOLAX Your last dose was: Your next dose is: Insert 10 mg into rectum daily as needed. 10 mg  
    
   
   
   
  
 pantoprazole 40 mg tablet Commonly known as:  PROTONIX Your last dose was: Your next dose is: Take 1 Tab by mouth daily. 40 mg  
    
   
   
   
  
 sertraline 50 mg tablet Commonly known as:  ZOLOFT Your last dose was: Your next dose is: TAKE ONE TABLET BY MOUTH DAILY Where to Get Your Medications Information on where to get these meds will be given to you by the nurse or doctor. ! Ask your nurse or doctor about these medications  
  oxyCODONE-acetaminophen 5-325 mg per tablet Opioid Education Prescription Opioids: What You Need to Know: 
 
  
PATIENT ID: Dasia Barker MRN: [id number] YOB: 1955 DATE OF ADMISSION: 8/20/2018  1:35 PM   
DATE OF DISCHARGE: 8/23/2018 PRIMARY CARE PROVIDER: Josh Hernandez MD  
ATTENDING PHYSICIAN: Kathrine Frank MD 
DISCHARGING PROVIDER: Vanesa Joaquin NP. To contact this individual call [phone] and ask the  to page. If unavailable ask to be transferred the Adult Hospitalist Department. 
  
CONSULTATIONS: IP CONSULT  [address]:  
Pt presented to the ED from home due to left leg pain.  per pt hx, she fell 2 weeks ago. She had called EMS but she then told them it was not an emergency, so was not brought to the ED.  Since the fall, she had been finding it even more difficult to move and perform daily tasks. She indicated she usually moved around in a wheelchair and walk on a walker for short distances. Due to the severe pain in her left leg and inability to stand without possibly falling, she called EMS once again and this time came to ED. 
  
DISCHARGE DIAGNOSES / PLAN:   
  
Closed fracture of the proximal end of the left fibula (POA):  
- has been fitted for boot and now WBAT 
- Pain management with Norco (noted that prescription was done while pt in ED but did not see paper copy in chart - will print another for nursing facility) 
- continue PT/OT 
- discharge to skilled nursing today 
   
History of multiple sclerosis: stable at this time.   
- Patient stated she does not take Rebif any more, had allergic reaction (skin rash and difficulty breathing), medication removed from meds list. 
- She should continue outpatient followup with neurology 
   
Depression: stable, continue home meds 
   
  
FOLLOW UP APPOINTMENTS:   
Follow-up Information Follow up With Details Comments Karen Workman MD   or facility provider in 2 weeks [address] [address] 
[phone]  
  [facility] EM[facility]ENC[facility]P   If symptoms worsen 200 [facility] [address] 
[facility]     [address] [place] [address] 
[phone]  
  Contreras Martin MD In 2 weeks   [facility] [address] [address] 
[phone] ADDITIONAL CARE RECOMMENDATIONS:  
Follow up with Orthopedics in 2 weeks                     
  
DIET: Regular Diet 
  
ACTIVITY: Activity as tolerated and PT/OT Eval and Treat WBAT as tolerated while boot is on May remove boot for bathing and sleep 
  
EQUIPMENT needed: Boot on while OOB 
- additional equipment as per PT/OT 
  
DISCHARGE MEDICATIONS: 
    
Current Discharge Medication List  
   
    
START taking these medications  
  Details HYDROcodone-acetaminophen (NORCO) 5-325 mg per tablet Take 1 Tab by mouth every four (4) hours as needed for Pain. Max Daily Amount: 6 Tabs. Qty: 20 Tab, Refills: 0  
  Associated Diagnoses: Closed fracture of both fibulae, initial encounter  
   
   
   
CONTINUE these medications which have NOT CHANGED  
  Details  
tiZANidine (ZANAFLEX) 4 mg tablet TAKE ONE TABLET BY MOUTH EVERY 8 HOURS AS NEEDED Qty: 60 Tab, Refills: 0  
   
sertraline (ZOLOFT) 50 mg tablet TAKE ONE TABLET BY MOUTH DAILY Qty: 30 Tab, Refills: 2  
   
pantoprazole (PROTONIX) 40 mg tablet Take 1 Tab by mouth daily. Qty: 1 Tab, Refills: 0  
   
bisacodyl (DULCOLAX) 10 mg suppository Insert 10 mg into rectum daily as needed. Qty: 1 Each, Refills: 0  
   
   
  
NOTIFY YOUR PHYSICIAN FOR ANY OF THE FOLLOWING:  
Fever over 101 degrees for 24 hours. Chest pain, shortness of breath, fever, chills, nausea, vomiting, diarrhea, change in mentation, falling, weakness, bleeding. Severe pain or pain not relieved by medications. Or, any other signs or symptoms that you may have questions about. 
  
DISPOSITION: 
   Home With: 
  OT   PT   HH   RN  
  
xx Long term SNF/Inpatient Rehab  
  Independent/assisted living  
  Hospice  
  Other:  
  
PATIENT CONDITION AT DISCHARGE:  
Functional status  
xx Poor   
xx Deconditioned   
  Independent   
  
Cognition  
xx  Lucid   
  Forgetful   
  Dementia   
  
Catheters/lines (plus indication)  
  Leon   
  PICC   
  PEG   
xx None   
  
Code status  
xx  Full code   
  DNR   
  
PHYSICAL EXAMINATION AT DISCHARGE: 
/81 (BP 1 Location: Right arm, BP Patient Position: At rest)  Pulse 85  Temp 98.8 °F (37.1 °C)  Resp 16  Ht 5' 3\" (1.6 m)  Wt 72 kg (158 lb 11.7 oz)  SpO2 98%  BMI 28.12 kg/m2 
  
Pt lying in bed eating lunch.  Has no new complaints and aware plans for discharge today. 
  
Constitutional:  No acute distress, cooperative, pleasant   
 ENT:  Oral mucous membranes moist, oropharynx benign. Resp:  CTA bilaterally. No accessory muscle use, on RA  
CV:  Regular rhythm, normal rate, no murmurs  
 GI:  Soft, non distended, non tender. Normoactive bowel sounds   
 Musculoskeletal:  left foot in boot, moves toes.   
 Neurologic:  Moves all extremities.  AAOx3  
                        Psych:  Good insight, Not anxious nor agitated. 
  
CHRONIC MEDICAL DIAGNOSES: 
Problem List as of 8/23/2018  Date Reviewed: 11/30/2017  
          Codes Class Noted - Resolved  
  * (Principal)Closed fracture of proximal end of left fibula ICD-10-CM: S82.832A ICD-9-CM: 823.01   8/20/2018 - Present  
     
  Inability to ambulate due to left ankle or foot ICD-10-CM: R26.2 ICD-9-CM: 719.7   8/20/2018 - Present  
     
  Multiple sclerosis exacerbation ([facility] [address] ICD-10-CM: G35 
ICD-9-CM: 340   2/24/2017 - Present  
     
  
  
Greater than 30 minutes were spent with the patient on counseling and coordination of care 
  
Signed:  
Olimpia Rasheed NP 
8/23/2018 
1:29 PM 
 
  
 
  
  
  
[employer] Announcement We are excited to announce that we are making your provider's discharge notes available to you in [employer]. You will see these notes when they are completed and signed by the physician that discharged you from your recent hospital stay. If you have any questions or concerns about any information you see in [employer], please call the Health Information Department where you were seen or reach out to your Primary Care Provider for more information about your plan of care. Introducing [facility]! [employer] introduces [employer] patient portal. Now you can access parts of your medical record, email your doctor's office, and request medication refills online. 1. In your internet browser, go to [email or web address] 2. Click on the First Time User? Click Here link in the Sign In box. You will see the New Member Sign Up page. 3. Enter your [employer] Access Code exactly as it appears below. You will not need to use this code after youve completed the sign-up process. If you do not sign up before the expiration date, you must request a new code. · [employer] Access Code: [email or web address] Expires: 11/18/2018  1:40 PM 
 
4. Enter the last four digits of your Social Security Number (xxxx) and Date of Birth (mm/dd/yyyy) as indicated and click Submit. You will be taken to the next sign-up page. 5. Create a [employer] ID. This will be your [employer] login ID and cannot be changed, so think of one that is secure and easy to remember. 6. Create a [employer] password. You can change your password at any time. 7. Enter your Password Reset Question and Answer. This can be used at a later time if you forget your password. 8. Enter your e-mail address. You will receive e-mail notification when new information is available in [facility] [address] [facility] [address]. 9. Click Sign Up. You can now view and download portions of your medical record. 10. Click the Download Summary menu link to download a portable copy of your medical information. If you have questions, please visit the Frequently Asked Questions section of the [employer] website. Remember, [employer] is NOT to be used for urgent needs. For medical emergencies, dial 911. Now available from your iPhone and Android! Introducing Bryce Merino As a [employer] patient, I wanted to make you aware of our electronic visit tool called Bryce Sorianovarghesesalvatore. [employer] 24/7 allows you to connect within minutes with a medical provider 24 hours a day, seven days a week via a mobile device or tablet or logging into a secure website from your computer. You can access Bryce Merino from anywhere in the [place].  
 
A virtual visit might be right for you when you have a simple condition and feel like you just dont want to get out of bed, or cant get away from work for an appointment, when your regular [facility] provider is not available (evenings, weekends or holidays), or when youre out of town and need minor care. Electronic visits cost only $49 and if the [employer] provider determines a prescription is needed to treat your condition, one can be electronically transmitted to a nearby pharmacy*. Please take a moment to enroll today if you have not already done so. The enrollment process is free and takes just a few minutes. To enroll, please download the [employer] lyle to your tablet or phone, or visit [email or web address] to enroll on your computer. And, as an [address] [place] [address] patient with a [employer] account, the results of your visits will be scanned into your electronic medical record and your primary care provider will be able to view the scanned results. We urge you to continue to see your regular [facility] provider for your ongoing medical care. And while your primary care provider may not be the one available when you seek a [employer] virtual visit, the peace of mind you get from getting a real diagnosis real time can be priceless. For more information on [employer], view our Frequently Asked Questions (FAQs) at [email or web address]. Sincerely, 
 
Dru Natarajan MD 
Chief Medical Officer Lalo Nancy Dietz *:  certain medications cannot be prescribed via [employer] Providers Seen During Your Hospitalization Provider Specialty Primary office phone Giana Prescott MD Emergency Medicine [phone] Rosemary Mensah MD Internal Medicine [phone] Rachele Canales MD Internal Medicine [phone] Robbie Rodriguez MD Internal Medicine [phone] Your Primary Care Physician (PCP) Primary Care Physician Office Phone Office Fax Emilia Blas [phone] You are allergic to the following No active allergies Recent Documentation Height Weight BMI OB Status Smoking Status 1.6 m 72 kg 28.12 kg/m2 Hysterectomy Former Smoker Emergency Contacts Name Discharge Info Relation Home Work Mobile Holly,Susi DISCHARGE CAREGIVER [3] Other Relative [6] [phone] Anny Mejia DISCHARGE CAREGIVER [3] Other Relative [6] [phone] Patient Belongings The following personal items are in your possession at time of discharge: 
     Visual Aid: None Please provide this summary of care documentation to your next provider. Signatures-by signing, you are acknowledging that this After Visit Summary has been reviewed with you and you have received a copy. Patient Signature:  ____________________________________________________________ Date:  ____________________________________________________________  
  
[facility] Provider Signature:  ____________________________________________________________ Date:  ____________________________________________________________

## 2018-08-20 NOTE — PROGRESS NOTES
Problem: Discharge Planning  Goal: *Discharge to safe environment  Outcome: Progressing Towards Goal  Disposition Needs: Anticipated plan is for patient to transition to a SNF for skilled services at discharge. PT/OT will need to be consulted to review and assess for needs. Disposition needs TBD/subject to change pending recommendations. CM submitted an e-mail to [facility] Screener for assistance and is currently awaiting receipt of e-mail and follow-up. CM will continue to follow and assist with disposition needs as they arise. Mode of transport TBD at discharge.       TERESE Alvares/VANESSA  5:31 PM

## 2018-08-20 NOTE — ROUTINE PROCESS
TRANSFER - OUT REPORT:    Verbal report given to Amy(name) on Andre Goodrich  being transferred to (unit) for routine progression of care       Report consisted of patients Situation, Background, Assessment and   Recommendations(SBAR). Information from the following report(s) SBAR, Kardex, ED Summary and MAR was reviewed with the receiving nurse. Lines:   Peripheral IV 08/20/18 Right Arm (Active)   Site Assessment Clean, dry, & intact 8/20/2018  4:11 PM   Phlebitis Assessment 0 8/20/2018  4:11 PM   Infiltration Assessment 0 8/20/2018  4:11 PM   Dressing Status Clean, dry, & intact 8/20/2018  4:11 PM        Opportunity for questions and clarification was provided.       Patient transported with:   [employer]

## 2018-08-20 NOTE — ED TRIAGE NOTES
Triage:  Pt to ED from home via EMS due to concerns over continued left leg pain and spasms that has been ongoing for the past 2 weeks. Pt also states around 2 weeks ago had a ground level fall and was no evaluated. Pt states today, she states the pain in the left leg was more noted and had increased difficulty with ADL's. Pt states right leg generally weak, uses a power chair for mobility.

## 2018-08-20 NOTE — ED NOTES
Spoke to patient about home situation as she was up for discharge. Pt stated she she has no home help. Pt has hx of MS, paralyzed in right leg and fx to left leg with splint. 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 Spoke with Dr. Roma Goltz and Case Management about options. Pt needs medicaid for home nursing care, which she does not have. Pt to be admitted for now until further care can be arranged.

## 2018-08-20 NOTE — TELEPHONE ENCOUNTER
[facility] ER dept called stating patient is currently there. Came in for leg pain, believing it was neurological related. Nurse stated pt's Ras Rosenberg has a fracture.

## 2018-08-20 NOTE — PROGRESS NOTES
Reason for Admission:   Closed Fracture of both fibulae    CM met with patient and neighbor at bedside. Patient alert and oriented. CM verified demographics, insurance coverage, and PCP. CM reviewed case with RN. CM informed of the following:  Patient stated she she has no home help. Patient has hx of MS, paralyzed in right leg and fx to left leg with splint. 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 Patient needs medicaid for home nursing care, which she does not have. Patient to be admitted for now until further care can be arranged. Case reviewed with Attending. Patient alert and oriented. Patient resides home alone. Neighbors check on her weekly. Neighbor reporting that patient is requiring more care. Patient completes some ADL's and IADL's but with much difficulty. Patient reported that she sponge bathes. Patient receives meals on wheels M-F and a box of meals for the weekends (2 Meals a day). DME in the home consist of hospital bed, wheelchair, and walker. Patient reported that she stays in bed mostly due to not feeling safe to ambulate and when she does ambulate she often falls. Patient recently connected to "[employer] Connections in the community and was arranging for transportation with worker. Patient receives Disability as source of income in the amount of $988.00. Patient reported rent is currently $620.00 a month and would be increasing. Patient tearful and feels unsafe to return home in these current conditions. Pharmacy utilized for prescriptions is [address] [place] [address] located on [facility]. RRAT Score:          9           Plan for utilizing home health:   TBD/subject to change pending recommendations. Likelihood of Readmission:  Low                         Transition of Care Plan:    Anticipated plan is for patient to transition to a SNF for skilled services at discharge.    PT/OT will need to be consulted to review and assess for needs. Disposition needs TBD/subject to change pending recommendations. CM submitted an e-mail to [facility] Screener for assistance and is currently awaiting receipt of e-mail and follow-up. CM will continue to follow and assist with disposition needs as they arise. Mode of transport TBD at discharge. Care Management Interventions  PCP Verified by CM:  Yes  Palliative Care Criteria Met (RRAT>21 & CHF Dx)?: No  Mode of Transport at Discharge: BLS ([facility])  Transition of Care Consult (CM Consult): Discharge Planning  Discharge Durable Medical Equipment: No  Physical Therapy Consult: No  Occupational Therapy Consult: No  Speech Therapy Consult: No  Current Support Network: Own Home, Lives Alone, Other (Neighbors check on patient weekly)  Confirm Follow Up Transport: Other (see comment) ([facility])  Plan discussed with Pt/Family/Caregiver: Yes  Freedom of Choice Offered: Yes  Discharge Location  Discharge Placement: Skilled nursing facility    TERESE Johnson/VANESSA  5:27 PM

## 2018-08-21 PROCEDURE — G8978 MOBILITY CURRENT STATUS: HCPCS

## 2018-08-21 PROCEDURE — 74011250637 HC RX REV CODE- 250/637: Performed by: HOSPITALIST

## 2018-08-21 PROCEDURE — 74011250636 HC RX REV CODE- 250/636: Performed by: HOSPITALIST

## 2018-08-21 PROCEDURE — 96372 THER/PROPH/DIAG INJ SC/IM: CPT

## 2018-08-21 PROCEDURE — G8979 MOBILITY GOAL STATUS: HCPCS

## 2018-08-21 PROCEDURE — 65390000012 HC CONDITION CODE 44 OBSERVATION

## 2018-08-21 PROCEDURE — 99218 HC RM OBSERVATION: CPT

## 2018-08-21 PROCEDURE — 97530 THERAPEUTIC ACTIVITIES: CPT

## 2018-08-21 PROCEDURE — 97161 PT EVAL LOW COMPLEX 20 MIN: CPT

## 2018-08-21 RX ADMIN — Medication 10 ML: at 22:47

## 2018-08-21 RX ADMIN — PANTOPRAZOLE SODIUM 40 MG: 40 TABLET, DELAYED RELEASE ORAL at 07:45

## 2018-08-21 RX ADMIN — TIZANIDINE 4 MG: 2 TABLET ORAL at 14:49

## 2018-08-21 RX ADMIN — Medication 5 ML: at 06:00

## 2018-08-21 RX ADMIN — OXYCODONE HYDROCHLORIDE AND ACETAMINOPHEN 1 TABLET: 5; 325 TABLET ORAL at 11:27

## 2018-08-21 RX ADMIN — TIZANIDINE 4 MG: 2 TABLET ORAL at 22:24

## 2018-08-21 RX ADMIN — SERTRALINE HYDROCHLORIDE 50 MG: 50 TABLET ORAL at 22:24

## 2018-08-21 RX ADMIN — ENOXAPARIN SODIUM 40 MG: 100 INJECTION SUBCUTANEOUS at 20:27

## 2018-08-21 NOTE — ROUTINE PROCESS
Bedside shift change report given to Wally Phoenix ,RN (oncoming nurse) by Frankie Dutta RN(offgoing nurse). Report given with SBAR.

## 2018-08-21 NOTE — PROGRESS NOTES
Problem: Mobility Impaired (Adult and Pediatric)  Goal: *Acute Goals and Plan of Care (Insert Text)  Physical Therapy Goals  Initiated 8/21/2018  1. Patient will move from supine to sit and sit to supine  in bed with minimal assistance/contact guard assist within 7 day(s). 2.  Patient will transfer from bed to chair and chair to bed with moderate assistance  using the least restrictive device within 7 day(s). 3.  Patient will perform sit to stand with moderate assistance  within 7 day(s). 4.  Patient will ambulate with moderate assistance  for 5 feet with the least restrictive device within 7 day(s). physical Therapy EVALUATION  Patient: Lydia Kee (28 y.o. female)  Date: 8/21/2018  Primary Diagnosis: Inability to ambulate due to left ankle or foot  Inability to ambulate due to left ankle or foot        Precautions:   Fall, WBAT (LLE in CAM boot)    ASSESSMENT :  Based on the objective data described below, the patient presents with decreased functional mobility, decreased tolerance to activity, increased pain in LLE s/p fall resulting in L fibular fx. Patient received supine in bed, CAM boot to LLE and agreeable to therapy. Pt reported prior to admission, she was living alone in a 1 story home with a ramp to enter. Pt owns RW, manual and motorized , hospital bed. Patient with history of MS and has progressively gotten weaker over the past 2 weeks. Prior to this, pt was ambulatory with RW for short distances within the home. On exam, RLE with increased tone and clonus at R ankle and pt reported \"my left leg was my strong leg, because my right leg can't do much of anything. \"  Pt completed supine to sit with mod A x 2. Pt able to initiate movement of bilateral LEs towards EOB, but needed assistance to complete. Pt able to reach across midline with LUE to bed railing to facilitate rolling and sitting EOB. Patient with good static sitting balance.  Patient declined standing transfer at this time due to increased pain. Pt practiced lateral scooting alone edge of bed while seated and required min assist for scooting. Patient assisted back to supine position with LLE elevated for comfort. Patient will continue to benefit from PT to progress mobility as tolerated. Pt remains at increased risk for falls, history of frequent falls at home and progressive weakness. Patient will greatly benefit from rehab upon discharge to continue therapy efforts. Patient will benefit from skilled intervention to address the above impairments. Patients rehabilitation potential is considered to be Good  Factors which may influence rehabilitation potential include:   []         None noted  []         Mental ability/status  [x]         Medical condition  [x]         Home/family situation and support systems  []         Safety awareness  []         Pain tolerance/management  []         Other:      PLAN :  Recommendations and Planned Interventions:  [x]           Bed Mobility Training             [x]    Neuromuscular Re-Education  [x]           Transfer Training                   []    Orthotic/Prosthetic Training  [x]           Gait Training                         []    Modalities  [x]           Therapeutic Exercises           [x]    Edema Management/Control  [x]           Therapeutic Activities            [x]    Patient and Family Training/Education  []           Other (comment):    Frequency/Duration: Patient will be followed by physical therapy  5 times a week to address goals. Discharge Recommendations: Rehab  Further Equipment Recommendations for Discharge: tbd     SUBJECTIVE:   Patient stated I'll give it a try.     OBJECTIVE DATA SUMMARY:   HISTORY:    Past Medical History:   Diagnosis Date    [facility] sclerosis ([facility] [address]     Neurological disorder     Multiple sclerosis, in remission 2011    Other ill-defined conditions(799.89)     Mitral Value Prolapse     Past Surgical History:   Procedure Laterality Date    ABDOMEN SURGERY [address] UNLISTED      hernia repair    HX HYSTERECTOMY       Prior Level of Function/Home Situation: see above  Personal factors and/or comorbidities impacting plan of care:     Home Situation  Home Environment: Private residence  Wheelchair Ramp: Yes  One/Two Story Residence: One story  Living Alone: Yes  Support Systems: Friends \ neighbors  Patient Expects to be Discharged to[de-identified] Rehabilitation facility  Current DME Used/Available at Home: Grab bars, Hospital bed, Walker, rolling, Wheelchair, power, Wheelchair    EXAMINATION/PRESENTATION/DECISION MAKING:   Critical Behavior:              Hearing: Auditory  Auditory Impairment: None  Skin:    Edema:   Range Of Motion:  AROM: Generally decreased, functional (RLE grossly decreased)           PROM: Generally decreased, functional (RLE non functional)           Strength:     RLE grossly decreased--ankle dorsiflexion: , knee flexion:, knee extension:                       Tone & Sensation:   Tone: Abnormal (increased tone in RLE, clonus R ankle)             Functional Mobility:  Bed Mobility:     Supine to Sit: Moderate assistance; Adaptive equipment; Additional time;Assist x2  Sit to Supine: Moderate assistance  Scooting: Minimum assistance  Transfers:  Sit to Stand:  (NT--pt declined)                          Balance:   Sitting: Intact  Standing:  (NT--pt declined)  Ambulation/Gait Training:            Functional Measure:  Barthel Index:    Bathin  Bladder: 5  Bowels: 5  Groomin  Dressin  Feeding: 10  Mobility: 0  Stairs: 0  Toilet Use: 5  Transfer (Bed to Chair and Back): 5  Total: 35       Barthel and G-code impairment scale:  Percentage of impairment CH  0% CI  1-19% CJ  20-39% CK  40-59% CL  60-79% CM  80-99% CN  100%   Barthel Score 0-100 100 99-80 79-60 59-40 20-39 1-19   0   Barthel Score 0-20 20 17-19 13-16 9-12 5-8 1-4 0      The Barthel ADL Index: Guidelines  1.  The index should be used as a record of what a patient does, not as a record of what a patient could do. 2. The main aim is to establish degree of independence from any help, physical or verbal, however minor and for whatever reason. 3. The need for supervision renders the patient not independent. 4. A patient's performance should be established using the best available evidence. Asking the patient, friends/relatives and nurses are the usual sources, but direct observation and common sense are also important. However direct testing is not needed. 5. Usually the patient's performance over the preceding 24-48 hours is important, but occasionally longer periods will be relevant. 6. Middle categories imply that the patient supplies over 50 per cent of the effort. 7. Use of aids to be independent is allowed. Kasey Barlow., Barthel, D.W. (5168). Functional evaluation: the Barthel Index. [facility] (14)2. Lisette Ayala justina NEHAL Urias, Bree Gallo., Alfredo Matthews., [place], [address] [place] [address] (1999). Measuring the change indisability after inpatient rehabilitation; comparison of the responsiveness of the Barthel Index and Functional Partlow Measure. Journal of Neurology, Neurosurgery, and Psychiatry, 66(4), 361-858. Peter oRdríguez, N.J.A, WILLEM Mars.J.LU, & Maged Pro, M.NICOLE. (2004.) Assessment of post-stroke quality of life in cost-effectiveness studies: The usefulness of the Barthel Index and the EuroQoL-5D. Quality of Life Research, 13, 217-06         G codes: In compliance with CMSs Claims Based Outcome Reporting, the following G-code set was chosen for this patient based on their primary functional limitation being treated: The outcome measure chosen to determine the severity of the functional limitation was the Barthel Index with a score of 35/100 which was correlated with the impairment scale.     ? Mobility - Walking and Moving Around:     - CURRENT STATUS: CL - 60%-79% impaired, limited or restricted    - GOAL STATUS: CK - 40%-59% impaired, limited or restricted    - D/C STATUS:  ---------------To be determined---------------       Based on the above components, the patient evaluation is determined to be of the following complexity level: LOW     Pain:  Pain Scale 1: Numeric (0 - 10)  Pain Intensity 1: 5  Pain Location 1: Ankle  Pain Orientation 1: Left  Pain Description 1: Aching  Pain Intervention(s) 1: Medication (see MAR)  Activity Tolerance:   Fair. VSS. Limited by pain  Please refer to the flowsheet for vital signs taken during this treatment. After treatment:   []         Patient left in no apparent distress sitting up in chair  [x]         Patient left in no apparent distress in bed  [x]         Call bell left within reach  [x]         Nursing notified  []         Caregiver present  []         Bed alarm activated    COMMUNICATION/EDUCATION:   The patients plan of care was discussed with: Registered Nurse. [x]         Fall prevention education was provided and the patient/caregiver indicated understanding. [x]         Patient/family have participated as able in goal setting and plan of care. [x]         Patient/family agree to work toward stated goals and plan of care. []         Patient understands intent and goals of therapy, but is neutral about his/her participation. []         Patient is unable to participate in goal setting and plan of care.     Thank you for this referral.  Abigail Kelley PT, DPT   Time Calculation: 17 mins

## 2018-08-21 NOTE — PROGRESS NOTES
Spiritual Care Partner Volunteer visited patient in [address] [place] [address] on 8/21/18. Documented by:   Chaplain Pina MDiv, MACE  [phone] ([phone])

## 2018-08-21 NOTE — H&P
[address]  HISTORY AND PHYSICAL      Triston Hernandez  MR#: [id number]  : 1955  ACCOUNT #: [de-identified]   ADMIT DATE: 2018    CHIEF COMPLAINT:  Left leg pain. HISTORY OF PRESENT ILLNESS:  The patient is a 25-year-old -American female with past medical history of functional multiple sclerosis. She presented from home to the ER on account of left leg pain. She said she was otherwise in her normal state of health until about 2 weeks ago when she fell. She said at that time, she called EMS, but she told them it was not an emergency, so she was not brought to the emergency department. She said since 2 weeks ago, she has been finding it even more difficult to do what she normally does. She normally moves around in a wheelchair, but she can walk on a walker for short distances. She could stand on a walker at baseline but she said today she realized that she could not stand and that she could fall. She was also experiencing severe pain on her left leg. So based on this, she decided to call EMS and she was brought to the emergency department for evaluation. Today in the emergency department, she had an x-ray of the left leg done which revealed fracture of the proximal shaft of the fibula and so based on this, the hospitalist group was subsequently consulted for evaluation. She, however, denies any chest pain or shortness of breath. No nausea, vomiting or abdominal pain. She is normally being followed by Dr. Lloyd Carlson, neurology, for multiple sclerosis. RECENT HOSPITALIZATION:  None. PAST MEDICAL HISTORY:  1.  Multiple sclerosis. 2.  Depression. PAST SURGICAL HISTORY:    1. She is status post hernia repair. 2.  Status post hysterectomy. HOME MEDICATIONS:  Include:  1. Bisacodyl 10 mg into rectum daily as needed. 2.  Protonix 40 mg daily. 3.  Rebidose 0.5 mL intramuscular route. 4.  Zoloft 50 mg daily.   5.  Zanaflex 4 mg every 8 hours p.r. n. ALLERGIES:  SHE HAS NO KNOWN DRUG ALLERGIES. FAMILY HISTORY:  She has no pertinent family history to report. SOCIAL HISTORY:  She lives by herself. She has no children. She is not . She does not smoke cigarettes. No alcohol use. Her surrogate decision maker is her cousin, Ms. Clemente Loo. She does not have her number at this time. SHE IS A FULL CODE. REVIEW OF SYSTEMS:  A 12-point review of system was done and this was negative apart from what was stated in the body of the history and physical.    PHYSICAL EXAMINATION:  TRIAGE VITAL SIGNS:  Temperature of 98.3, pulse 90 beats per minute, blood pressure 134/66, respiration is 20, oxygen saturation is 97% on room air. GENERAL:  She is lying on the bed. She is not in any obvious distress. HEENT:  Atraumatic, normocephalic. Pupils are equal, round and reactive to light and accommodation. Extraocular muscles are intact. Oral mucosa is moist.  NECK:  Supple. No jugular venous distention. RESPIRATION:  Clear to auscultation. CARDIAC:  S1 and S2 heard. Regular rate and rhythm. No gallops or murmurs. ABDOMEN:  Soft, nontender, nondistended. EXTREMITIES:  No pitting pedal edema. MUSCULOSKELETAL:  She has mild tenderness on palpation of the left lower extremity. No swelling or spasms are noted. NEUROLOGY:  She is alert and awake and oriented x3. No focal neurologic deficits. PSYCHIATRIC:  She has normal insight, judgment and thought content. LABORATORY DATA:  WBC is 9.1, hemoglobin 13.9, platelets 534. Chemistry:  Sodium of 142, potassium 4.4, chloride 106, bicarbonate 28, creatinine 0.67, ALT 21, AST 21. ASSESSMENT AND PLAN:  1. The patient is a 68-year-old female who was brought to the emergency department on account of severe left lower extremity pain. 2.  Closed fracture of the proximal end of the left fibula present on admission, status post fall.   She is now status post splint placement to the left lower extremity. We will consult orthopedics re advice and opinion. Pain management with Percocet 1 tablet as needed for pain. 3.  History of multiple sclerosis. This appears stable at this time. She should continue outpatient followup with neurology. 4.  History of depression, stable. Resume home Zoloft. 5.  Deep venous thrombosis prophylaxis, Lovenox 40 mg subcu daily. DISPOSITION:  The patient will be admitted to the medical floor. She lives alone and she will most likely require some form of placement so case management will be consulted.       MD NICOLA Saez/MN  D: 08/20/2018 18:24     T: 08/20/2018 20:17  JOB #: [id number]

## 2018-08-21 NOTE — PROGRESS NOTES
Hospitalist Progress Note  Patel Limon MD  Answering service: [phone] from in house phone        Date of Service:  2018  NAME:  Brenda Gibbs  :  1955  MRN:  [id number]      Admission Summary: \"Per HPI\"  52-year-old -American female with past medical history of functional multiple sclerosis. She presented from home to the ER on account of left leg pain. She said she was otherwise in her normal state of health until about 2 weeks ago when she fell. She said at that time, she called EMS, but she told them it was not an emergency, so she was not brought to the emergency department. She said since 2 weeks ago, she has been finding it even more difficult to do what she normally does. She normally moves around in a wheelchair, but she can walk on a walker for short distances. She could stand on a walker at baseline but she said today she realized that she could not stand and that she could fall. She was also experiencing severe pain on her left leg. So based on this, she decided to call EMS and she was brought to the emergency department for evaluation. Today in the emergency department, she had an x-ray of the left leg done which revealed fracture of the proximal shaft of the fibula and so based on this, the hospitalist group was subsequently consulted for evaluation. She, however, denies any chest pain or shortness of breath. No nausea, vomiting or abdominal pain. She is normally being followed by Dr. Adalberto Mendes, neurology, for multiple sclerosis. Interval history / Subjective:     Patient seen and examined this morning; states pain well controlled, no new complaint     Assessment & Plan:     #.  Closed fracture of the proximal end of the left fibula (POA) now with Inability to ambulate due to left ankle or foot pain  -s/p splint, orthopedics eval appreciated, PT/OT ordered - WBAT once fit for boot, to be fit for tall CAM boot. Pain management  PT/OT recommended rehab    #. History of multiple sclerosis: stable at this time.    -Patient stated she does not take Rebif any more; medication removed from meds list.  -She should continue outpatient followup with neurology    #. Depression: stable, continue home meds    Code status: full  DVT prophylaxis: Lovenox    Care Plan discussed with: Patient/Family and Nurse  Disposition: SNF/LTC and TBD     Hospital Problems  Date Reviewed: 11/30/2017          Codes Class Noted POA    * (Principal)Closed fracture of proximal end of left fibula ICD-10-CM: S82.832A  ICD-9-CM: 823.01  8/20/2018 Yes        Inability to ambulate due to left ankle or foot ICD-10-CM: R26.2  ICD-9-CM: 719.7  8/20/2018 Unknown                Review of Systems:   A comprehensive review of systems was negative except for that written in the HPI. Vital Signs:    Last 24hrs VS reviewed since prior progress note. Most recent are:  Visit Vitals    /76 (BP 1 Location: Left arm, BP Patient Position: At rest)    Pulse 92    Temp 97.8 °F (36.6 °C)    Resp 16    Ht 5' 3\" (1.6 m)    Wt 72 kg (158 lb 11.7 oz)    SpO2 94%    BMI 28.12 kg/m2         Intake/Output Summary (Last 24 hours) at 08/21/18 1914  Last data filed at 08/21/18 1407   Gross per 24 hour   Intake              420 ml   Output                0 ml   Net              420 ml        Physical Examination:             Constitutional:  No acute distress, cooperative, pleasant    ENT:  Oral mucous moist, oropharynx benign. Neck supple,    Resp:  CTA bilaterally. No wheezing/rhonchi/rales. No accessory muscle use   CV:  Regular rhythm, normal rate, no murmurs, gallops, rubs    GI:  Soft, non distended, non tender. normoactive bowel sounds, no hepatosplenomegaly     Musculoskeletal:  left foot splint. Neurologic:  Moves all extremities. AAOx3, CN II-XII reviewed     Psych:  Good insight, Not anxious nor agitated.        Data Review: Review and/or order of clinical lab test  Review and/or order of tests in the radiology section of [facility]      Labs:     Recent Labs      08/20/18   1358   WBC  9.1   HGB  13.9   HCT  44.0   PLT  319     Recent Labs      08/20/18   1358   NA  142   K  4.4   CL  106   CO2  28   BUN  9   CREA  0.67   GLU  78   CA  9.0     Recent Labs      08/20/18   1358   SGOT  21   ALT  21   AP  67   TBILI  0.4   TP  8.0   ALB  3.7   GLOB  4.3*     No results for input(s): INR, PTP, APTT in the last 72 hours. No lab exists for component: INREXT   No results for input(s): FE, TIBC, PSAT, FERR in the last 72 hours. Lab Results   Component Value Date/Time    Folate 15.9 04/04/2016 02:50 PM      No results for input(s): PH, PCO2, PO2 in the last 72 hours. No results for input(s): CPK, CKNDX, TROIQ in the last 72 hours.     No lab exists for component: CPKMB  Lab Results   Component Value Date/Time    Cholesterol, total 261 (H) 07/19/2016 03:51 PM    HDL Cholesterol 59 07/19/2016 03:51 PM    LDL, calculated 175 (H) 07/19/2016 03:51 PM    Triglyceride 136 07/19/2016 03:51 PM     No results found for: Linwood Manuel  Lab Results   Component Value Date/Time    Color YELLOW/STRAW 02/24/2017 05:39 PM    Appearance CLEAR 02/24/2017 05:39 PM    Specific gravity 1.020 02/24/2017 05:39 PM    pH (UA) 6.0 02/24/2017 05:39 PM    Protein NEGATIVE  02/24/2017 05:39 PM    Glucose NEGATIVE  02/24/2017 05:39 PM    Ketone NEGATIVE  02/24/2017 05:39 PM    Bilirubin NEGATIVE  02/24/2017 05:39 PM    Urobilinogen 0.2 02/24/2017 05:39 PM    Nitrites NEGATIVE  02/24/2017 05:39 PM    Leukocyte Esterase NEGATIVE  02/24/2017 05:39 PM    Epithelial cells FEW 02/24/2017 05:39 PM    Bacteria NEGATIVE  02/24/2017 05:39 PM    WBC 0-4 02/24/2017 05:39 PM    RBC 10-20 02/24/2017 05:39 PM         Medications Reviewed:     Current Facility-Administered Medications   Medication Dose Route Frequency    bisacodyl (DULCOLAX) suppository 10 mg  10 mg Rectal DAILY PRN    pantoprazole (PROTONIX) tablet 40 mg  40 mg Oral ACB    sodium chloride (NS) flush 5-10 mL  5-10 mL IntraVENous Q8H    sodium chloride (NS) flush 5-10 mL  5-10 mL IntraVENous PRN    enoxaparin (LOVENOX) injection 40 mg  40 mg SubCUTAneous Q24H    oxyCODONE-acetaminophen (PERCOCET) 5-325 mg per tablet 1 Tab  1 Tab Oral Q4H PRN    sertraline (ZOLOFT) tablet 50 mg  50 mg Oral QHS    tiZANidine (ZANAFLEX) tablet 4 mg  4 mg Oral Q8H PRN     ______________________________________________________________________  EXPECTED LENGTH OF STAY: 3d 0h  ACTUAL LENGTH OF STAY:          1                 Jorge A Perez MD

## 2018-08-21 NOTE — PROGRESS NOTES
Primary Nurse Linda Barry RN and Maximilian Mixon RN performed a dual skin assessment on this patient No impairment noted  Jass score is 18

## 2018-08-21 NOTE — PROGRESS NOTES
[facility] FRACTURE PROGRESS NOTE    2018  Admit Date:   2018    Post Op day: * No surgery found *    Subjective:    Mohit Dumont states that she has some pain with movement but is comfortable at rest. Remains in splint. Tolerating diet. No new Ortho complaints.   Denies N/V/SOB or CP     PT/OT:   Gait:                    Vital Signs:    Patient Vitals for the past 8 hrs:   BP Temp Pulse Resp   18 0904 117/74 98.5 °F (36.9 °C) 87 16     Temp (24hrs), Av.3 °F (36.8 °C), Min:98 °F (36.7 °C), Max:98.5 °F (36.9 °C)      Pain Control:   Pain Assessment  Pain Scale 1: Numeric (0 - 10)  Pain Intensity 1: 2  Pain Onset 1: Ongoing  Pain Location 1: Leg  Pain Orientation 1: Left  Pain Description 1: Aching    Meds:    Current Facility-Administered Medications   Medication Dose Route Frequency    bisacodyl (DULCOLAX) suppository 10 mg  10 mg Rectal DAILY PRN    pantoprazole (PROTONIX) tablet 40 mg  40 mg Oral ACB    sodium chloride (NS) flush 5-10 mL  5-10 mL IntraVENous Q8H    sodium chloride (NS) flush 5-10 mL  5-10 mL IntraVENous PRN    enoxaparin (LOVENOX) injection 40 mg  40 mg SubCUTAneous Q24H    oxyCODONE-acetaminophen (PERCOCET) 5-325 mg per tablet 1 Tab  1 Tab Oral Q4H PRN    sertraline (ZOLOFT) tablet 50 mg  50 mg Oral QHS    tiZANidine (ZANAFLEX) tablet 4 mg  4 mg Oral Q8H PRN       LAB:    Recent Labs      18   1358   HCT  44.0   HGB  13.9       Transfuse PRBC's:      Assessment & Physician's Comment:  Neurovascular checks within normal limits  Orientation:  Oriented    Principal Problem:    Closed fracture of proximal end of left fibula (2018)    Active Problems:    Inability to ambulate due to left ankle or foot (2018)        Plan:    PT/OT ordered - WBAT once fit for boot  To be fit for tall CAM boot this morning  Cont current pain management  On Lovenox for DVT prophylaxis  Medical management per primary team    Dr Nya Solitario aware of patient and agrees with current plan of care    Khadijah Meneses PA-C   Orthopedic Trauma Service  [address]

## 2018-08-21 NOTE — PROGRESS NOTES
Code 44 signed by the patient and copy placed in the hard chart. KJT    Therapy is recommending rehab for this patient. CRM met with the patient and offered choice. The patient has been to [facility] in the past and would like to go back to rehab. CRM sent referral via [address] [place] [address] Ave. The patient will need insurance authorization with [facility]. Will follow. Fernando Lozanoevie [phone] from [facility] met with the patient. The patient stated that she may need LTC. Med Assist has completed the medicaid application and submitted it to [facility]. CRM will complete a [facility] in the system. Helena stated that if the patient will be continuing her MS medication, she will need to fill the new prescription here prior to admission or bring what she has from home. This drug is too expensive for the SNF. Acceptance to [facility] is pending (need to know MS drug) And the patient will need insurance authorization. CRM will follow. KJT    The patient stated that she will no longer be taking the MS drug. She is requesting that it be removed from her med list. VANESSA spoke with Yessenia Nails from [facility]. The patient has been accepted for SNF and they will start the insurance authorization. CRM will follow.  CAMT

## 2018-08-22 ENCOUNTER — TELEPHONE (OUTPATIENT)
Dept: NEUROLOGY | Age: 63
End: 2018-08-22

## 2018-08-22 PROCEDURE — 97166 OT EVAL MOD COMPLEX 45 MIN: CPT

## 2018-08-22 PROCEDURE — 74011250636 HC RX REV CODE- 250/636: Performed by: HOSPITALIST

## 2018-08-22 PROCEDURE — 99218 HC RM OBSERVATION: CPT

## 2018-08-22 PROCEDURE — G8987 SELF CARE CURRENT STATUS: HCPCS

## 2018-08-22 PROCEDURE — 97535 SELF CARE MNGMENT TRAINING: CPT

## 2018-08-22 PROCEDURE — 96372 THER/PROPH/DIAG INJ SC/IM: CPT

## 2018-08-22 PROCEDURE — 74011250637 HC RX REV CODE- 250/637: Performed by: HOSPITALIST

## 2018-08-22 PROCEDURE — G8988 SELF CARE GOAL STATUS: HCPCS

## 2018-08-22 PROCEDURE — 97530 THERAPEUTIC ACTIVITIES: CPT

## 2018-08-22 RX ADMIN — OXYCODONE HYDROCHLORIDE AND ACETAMINOPHEN 1 TABLET: 5; 325 TABLET ORAL at 14:43

## 2018-08-22 RX ADMIN — Medication 5 ML: at 14:00

## 2018-08-22 RX ADMIN — SERTRALINE HYDROCHLORIDE 50 MG: 50 TABLET ORAL at 22:04

## 2018-08-22 RX ADMIN — PANTOPRAZOLE SODIUM 40 MG: 40 TABLET, DELAYED RELEASE ORAL at 07:44

## 2018-08-22 RX ADMIN — Medication 10 ML: at 22:04

## 2018-08-22 RX ADMIN — TIZANIDINE 4 MG: 2 TABLET ORAL at 22:10

## 2018-08-22 RX ADMIN — ENOXAPARIN SODIUM 40 MG: 100 INJECTION SUBCUTANEOUS at 20:27

## 2018-08-22 RX ADMIN — OXYCODONE HYDROCHLORIDE AND ACETAMINOPHEN 1 TABLET: 5; 325 TABLET ORAL at 04:23

## 2018-08-22 RX ADMIN — TIZANIDINE 4 MG: 2 TABLET ORAL at 09:46

## 2018-08-22 RX ADMIN — OXYCODONE HYDROCHLORIDE AND ACETAMINOPHEN 1 TABLET: 5; 325 TABLET ORAL at 18:49

## 2018-08-22 NOTE — PROGRESS NOTES
Bedside and Verbal shift change report given to Ginna Medley RN (oncoming nurse) by Shahab Patel RN (offgoing nurse). Report included the following information SBAR, Kardex, Intake/Output, MAR, Accordion and Recent Results.

## 2018-08-22 NOTE — PROGRESS NOTES
Bedside shift change report given to [facility] (oncoming nurse) by Panda Villa (offgoing nurse). Report included the following information SBAR, Kardex, Intake/Output and MAR.

## 2018-08-22 NOTE — TELEPHONE ENCOUNTER
Pt's facility calling to let Dr. Kari Altamirano know the pt is still in the hospital and she broke her leg in two places.  No need for a call back

## 2018-08-22 NOTE — PROGRESS NOTES
Problem: Mobility Impaired (Adult and Pediatric)  Goal: *Acute Goals and Plan of Care (Insert Text)  Physical Therapy Goals  Initiated 8/21/2018  1. Patient will move from supine to sit and sit to supine  in bed with minimal assistance/contact guard assist within 7 day(s). 2.  Patient will transfer from bed to chair and chair to bed with moderate assistance  using the least restrictive device within 7 day(s). 3.  Patient will perform sit to stand with moderate assistance  within 7 day(s). 4.  Patient will ambulate with moderate assistance  for 5 feet with the least restrictive device within 7 day(s). physical Therapy TREATMENT  Patient: Oleh Favre (29 y.o. female)  Date: 8/22/2018  Diagnosis: Inability to ambulate due to left ankle or foot  Inability to ambulate due to left ankle or foot Closed fracture of proximal end of left fibula       Precautions: Fall, WBAT (LLE in CAM boot)  Chart, physical therapy assessment, plan of care and goals were reviewed. ASSESSMENT:  Patient received supine in bed and agreeable to therapy. Pt tolerated session well and making progress towards goals. Patient completed supine to sit with mod assist x 2. Pt required assist at bilateral LEs (R>L) and at trunk to assume sitting EOB position. Provided verbal cues to reach across midline for railing to assist. Pt performed sit<>Stand with RW with MOD assist and verbal cues for proper hand placement. Pt transferred from bed to chair with mod assist demonstrating decreased step length bilaterally, step to gait pattern, antalgic gait. Pt reported her RLE felt like it was giving way when turning to sit, but did not require any additional support to prevent LOB. Pt will continue to benefit from PT to progress mobility. Pt will benefit from SNF upon discharge.    Progression toward goals:  [x]    Improving appropriately and progressing toward goals  []    Improving slowly and progressing toward goals  []    Not making progress toward goals and plan of care will be adjusted     PLAN:  Patient continues to benefit from skilled intervention to address the above impairments. Continue treatment per established plan of care. Discharge Recommendations:  Shane Ybarra  Further Equipment Recommendations for Discharge:  tbd     SUBJECTIVE:   Patient stated I am just feeling tired.     OBJECTIVE DATA SUMMARY:   Critical Behavior:  Neurologic State: Alert, Appropriate for age  Orientation Level: Oriented X4  Cognition: Appropriate for age attention/concentration, Appropriate safety awareness     Functional Mobility Training:  Bed Mobility:     Supine to Sit: Moderate assistance;Assist x2              Transfers:  Sit to Stand: Moderate assistance  Stand to Sit: Moderate assistance        Bed to Chair: Moderate assistance                    Balance:  Sitting: Intact  Standing: Impaired; With support  Standing - Static: Good;Constant support  Standing - Dynamic : Fair  Ambulation/Gait Training:  Distance (ft): 2 Feet (ft)  Assistive Device: Gait belt;Walker, rolling  Ambulation - Level of Assistance: Moderate assistance;Assist x1        Gait Abnormalities: Decreased step clearance; Step to gait     Left Side Weight Bearing: As tolerated (CAM boot)  Base of Support: Widened     Speed/Anni: Pace decreased (<100 feet/min)  Step Length: Right shortened;Left shortened                    Stairs:              Neuro Re-Education:    Therapeutic Exercises:     Pain:  Pain Scale 1: Numeric (0 - 10)  Pain Intensity 1: 3  Pain Location 1: Ankle  Pain Orientation 1: Left  Pain Description 1: Sore  Pain Intervention(s) 1: Medication (see MAR); Rest  Activity Tolerance:   Good. vss  Please refer to the flowsheet for vital signs taken during this treatment.   After treatment:   [x]    Patient left in no apparent distress sitting up in chair  []    Patient left in no apparent distress in bed  [x]    Call bell left within reach  [x]    Nursing notified  [] Caregiver present  []    Bed alarm activated    COMMUNICATION/COLLABORATION:   The patients plan of care was discussed with: Occupational Therapist and Registered Nurse    Akiko Patel, PT, DPT   Time Calculation: 14 mins

## 2018-08-22 NOTE — PROGRESS NOTES
Hospitalist Progress Note  Lauren Atkins MD  Answering service: [phone] from in house phone        Date of Service:  2018  NAME:  Anoop Almonte  :  1955  MRN:  [id number]      Admission Summary: \"Per HPI\"  55-year-old -American female with past medical history of functional multiple sclerosis. She presented from home to the ER on account of left leg pain. She said she was otherwise in her normal state of health until about 2 weeks ago when she fell. She said at that time, she called EMS, but she told them it was not an emergency, so she was not brought to the emergency department. She said since 2 weeks ago, she has been finding it even more difficult to do what she normally does. She normally moves around in a wheelchair, but she can walk on a walker for short distances. She could stand on a walker at baseline but she said today she realized that she could not stand and that she could fall. She was also experiencing severe pain on her left leg. So based on this, she decided to call EMS and she was brought to the emergency department for evaluation. Today in the emergency department, she had an x-ray of the left leg done which revealed fracture of the proximal shaft of the fibula and so based on this, the hospitalist group was subsequently consulted for evaluation. She, however, denies any chest pain or shortness of breath. No nausea, vomiting or abdominal pain. She is normally being followed by Dr. Pao Joel, neurology, for multiple sclerosis. Interval history / Subjective:   Patient seen and examined; no new complaint, pain controlled with current pain meds     Assessment & Plan:     #.  Closed fracture of the proximal end of the left fibula (POA) now with Inability to ambulate due to left ankle or foot pain  -s/p splint, orthopedics eval appreciated, PT/OT ordered - WBAT once fit for boot, to be fit for tall CAM boot. Pain management  PT/OT recommended rehab    #. History of multiple sclerosis: stable at this time.    -Patient stated she does not take Rebif any more, had allergic reaction (skin rash and difficulty breathing), medication removed from meds list.  -She should continue outpatient followup with neurology    #. Depression: stable, continue home meds    Code status: full  DVT prophylaxis: Lovenox    Care Plan discussed with: Patient/Family and Nurse  Disposition: SNF/LTC and TBD pending insurance authorization. Hospital Problems  Date Reviewed: 11/30/2017          Codes Class Noted POA    * (Principal)Closed fracture of proximal end of left fibula ICD-10-CM: S82.832A  ICD-9-CM: 823.01  8/20/2018 Yes        Inability to ambulate due to left ankle or foot ICD-10-CM: R26.2  ICD-9-CM: 719.7  8/20/2018 Unknown                Review of Systems:   A comprehensive review of systems was negative except for that written in the HPI. Vital Signs:    Last 24hrs VS reviewed since prior progress note. Most recent are:  Visit Vitals    /68 (BP 1 Location: Left arm, BP Patient Position: At rest;Head of bed elevated (Comment degrees))    Pulse 79    Temp 98.1 °F (36.7 °C)    Resp 17    Ht 5' 3\" (1.6 m)    Wt 72 kg (158 lb 11.7 oz)    SpO2 94%    BMI 28.12 kg/m2         Intake/Output Summary (Last 24 hours) at 08/22/18 1106  Last data filed at 08/22/18 0843   Gross per 24 hour   Intake              720 ml   Output                0 ml   Net              720 ml        Physical Examination:             Constitutional:  No acute distress, cooperative, pleasant    ENT:  Oral mucous moist, oropharynx benign. Neck supple,    Resp:  CTA bilaterally. No wheezing/rhonchi/rales. No accessory muscle use   CV:  Regular rhythm, normal rate, no murmurs, gallops, rubs    GI:  Soft, non distended, non tender. normoactive bowel sounds, no hepatosplenomegaly     Musculoskeletal:  left foot splint. Neurologic:  Moves all extremities. AAOx3, CN II-XII reviewed     Psych:  Good insight, Not anxious nor agitated. Data Review:    Review and/or order of clinical lab test  Review and/or order of tests in the radiology section of [facility]      Labs:     Recent Labs      08/20/18   1358   WBC  9.1   HGB  13.9   HCT  44.0   PLT  319     Recent Labs      08/20/18   1358   NA  142   K  4.4   CL  106   CO2  28   BUN  9   CREA  0.67   GLU  78   CA  9.0     Recent Labs      08/20/18   1358   SGOT  21   ALT  21   AP  67   TBILI  0.4   TP  8.0   ALB  3.7   GLOB  4.3*     No results for input(s): INR, PTP, APTT in the last 72 hours. No lab exists for component: INREXT, INREXT   No results for input(s): FE, TIBC, PSAT, FERR in the last 72 hours. Lab Results   Component Value Date/Time    Folate 15.9 04/04/2016 02:50 PM      No results for input(s): PH, PCO2, PO2 in the last 72 hours. No results for input(s): CPK, CKNDX, TROIQ in the last 72 hours.     No lab exists for component: CPKMB  Lab Results   Component Value Date/Time    Cholesterol, total 261 (H) 07/19/2016 03:51 PM    HDL Cholesterol 59 07/19/2016 03:51 PM    LDL, calculated 175 (H) 07/19/2016 03:51 PM    Triglyceride 136 07/19/2016 03:51 PM     No results found for: Rambo Hollis  Lab Results   Component Value Date/Time    Color YELLOW/STRAW 02/24/2017 05:39 PM    Appearance CLEAR 02/24/2017 05:39 PM    Specific gravity 1.020 02/24/2017 05:39 PM    pH (UA) 6.0 02/24/2017 05:39 PM    Protein NEGATIVE  02/24/2017 05:39 PM    Glucose NEGATIVE  02/24/2017 05:39 PM    Ketone NEGATIVE  02/24/2017 05:39 PM    Bilirubin NEGATIVE  02/24/2017 05:39 PM    Urobilinogen 0.2 02/24/2017 05:39 PM    Nitrites NEGATIVE  02/24/2017 05:39 PM    Leukocyte Esterase NEGATIVE  02/24/2017 05:39 PM    Epithelial cells FEW 02/24/2017 05:39 PM    Bacteria NEGATIVE  02/24/2017 05:39 PM    WBC 0-4 02/24/2017 05:39 PM    RBC 10-20 02/24/2017 05:39 PM         Medications Reviewed:     Current Facility-Administered Medications   Medication Dose Route Frequency    bisacodyl (DULCOLAX) suppository 10 mg  10 mg Rectal DAILY PRN    pantoprazole (PROTONIX) tablet 40 mg  40 mg Oral ACB    sodium chloride (NS) flush 5-10 mL  5-10 mL IntraVENous Q8H    sodium chloride (NS) flush 5-10 mL  5-10 mL IntraVENous PRN    enoxaparin (LOVENOX) injection 40 mg  40 mg SubCUTAneous Q24H    oxyCODONE-acetaminophen (PERCOCET) 5-325 mg per tablet 1 Tab  1 Tab Oral Q4H PRN    sertraline (ZOLOFT) tablet 50 mg  50 mg Oral QHS    tiZANidine (ZANAFLEX) tablet 4 mg  4 mg Oral Q8H PRN     ______________________________________________________________________  EXPECTED LENGTH OF STAY: 3d 0h  ACTUAL LENGTH OF STAY:          1                 Jorge A Cheema MD

## 2018-08-22 NOTE — PROGRESS NOTES
Bedside and Verbal shift change report given to Rachel Will (oncoming nurse) by Porter Drake (offgoing nurse). Report included the following information SBAR, Kardex, Intake/Output and MAR.

## 2018-08-22 NOTE — PROGRESS NOTES
Problem: Self Care Deficits Care Plan (Adult)  Goal: *Acute Goals and Plan of Care (Insert Text)  Occupational Therapy Goals  Initiated 8/22/2018  1. Patient will perform grooming standing x 2 mins at RW level with min A within 7 day(s). 2.  Patient will perform lower body dressing with moderate assistance  within 7 day(s). 3.  Patient will perform toilet transfers to [facility] level with contact guard assist within 7 day(s). 4.  Patient will perform bathing with best techniques with min A within 7 day(s). 5.  Patient will perform all aspects of toileting with CGA within 7 day(s). 6.  Patient will participate in upper extremity therapeutic exercise/activities with supervision/set-up for 8 minutes within 7 day(s). Occupational Therapy EVALUATION  Patient: Chapin Lamb (89 y.o. female)  Date: 8/22/2018  Primary Diagnosis: Inability to ambulate due to left ankle or foot  Inability to ambulate due to left ankle or foot        Precautions:  Fall, WBAT (LLE in CAM boot)    ASSESSMENT :  Based on the objective data described below, the patient presents with severe impairment in functional mobility, activity tolerance and balance necessary in ADL tasks s/p fall with L fib fx in cam boot WBAT with hx of MS.  Nursing cleared for therapy. She lives home alone and reports increased weakness in last two weeks. She reports mod indep for short house hold distances at RW level and mod indep at ADL tasks with compensatory techniques. She reports impaired vision at baseline as well as impaired tone in RLE. Completed UB bathing in sitting with set up, LB bathing for posterior at total A in standing requiring constant BUE support on RW, and self care transfer with mod A at RW level. She is presenting below her baseline of function. Recommend dc to rehab for additional skilled OT services, patient in agreement. Patient will benefit from skilled intervention to address the above impairments.   Patients rehabilitation potential is considered to be Fair  Factors which may influence rehabilitation potential include:   []             None noted  []             Mental ability/status  []             Medical condition  [x]             Home/family situation and support systems  []             Safety awareness  []             Pain tolerance/management  []             Other:      PLAN :  Recommendations and Planned Interventions:  [x]               Self Care Training                  [x]        Therapeutic Activities  [x]               Functional Mobility Training    []        Cognitive Retraining  [x]               Therapeutic Exercises           [x]        Endurance Activities  [x]               Balance Training                   []        Neuromuscular Re-Education  []               Visual/Perceptual Training     [x]   Home Safety Training  [x]               Patient Education                 [x]        Family Training/Education  []               Other (comment):    Frequency/Duration: Patient will be followed by occupational therapy 5 times a week to address goals. Discharge Recommendations: Rehab  Further Equipment Recommendations for Discharge: TBD rehab     SUBJECTIVE:   Patient stated I am just worried about what's next.     OBJECTIVE DATA SUMMARY:   HISTORY:   Past Medical History:   Diagnosis Date    Multiple sclerosis ([facility] [address]     Neurological disorder     Multiple sclerosis, in remission 2011    Other ill-defined conditions(799.89)     Mitral Value Prolapse     Past Surgical History:   Procedure Laterality Date    ABDOMEN SURGERY PROC UNLISTED      hernia repair    HX HYSTERECTOMY         Prior Level of Function/Environment/Context: Home alone. Mod indep with ADL tasks at RW level. Amb short household distances.  Standing at sink for grooming tasks, amb to bathroom for toileting, and light meal prep  Expanded or extensive additional review of patient history:   Home Situation  Home Environment: Private residence  Wheelchair Ramp: Yes  One/Two Story Residence: One story  Living Alone: Yes  Support Systems: Friends \ neighbors  Patient Expects to be Discharged to[de-identified] Rehabilitation facility  Current DME Used/Available at Home: Grab bars, Hospital bed, Belita Gallop, rolling, Wheelchair, power, Wheelchair  Tub or Shower Type:  (sponge baths)    Hand dominance: Right    EXAMINATION OF PERFORMANCE DEFICITS:  Cognitive/Behavioral Status:  Neurologic State: Alert; Appropriate for age  Orientation Level: Oriented X4         Hearing: Auditory  Auditory Impairment: None    Vision/Perceptual:                           Acuity: Impaired near vision; Impaired far vision (able to see close up blurry and shapes/shadows)         Range of Motion:  AROM: Generally decreased, functional  PROM: Generally decreased, functional                      Strength:   BUE: mild impairment, functional                 Coordination:     Fine Motor Skills-Upper: Left Intact; Right Intact    Gross Motor Skills-Upper: Left Intact; Right Intact    Tone & Sensation:  Tone: Abnormal (RLE hypertone)                         Balance:  Sitting: Intact  Standing: Impaired; With support  Standing - Static: Good;Constant support  Standing - Dynamic : Fair    Functional Mobility and Transfers for ADLs:  Bed Mobility:  Supine to Sit: Moderate assistance;Assist x2    Transfers:  Sit to Stand: Moderate assistance  Stand to Sit: Moderate assistance  Bed to Chair: Moderate assistance    ADL Assessment:  Feeding: Setup    Oral Facial Hygiene/Grooming: Setup (seated)    Bathing: Maximum assistance    Upper Body Dressing: Minimum assistance    Lower Body Dressing: Maximum assistance; Total assistance    Toileting: Maximum assistance                ADL Intervention and task modifications:       Grooming  Brushing Teeth: Supervision/set-up (seated)    Upper Body Bathing  Bathing Assistance: Supervision/set-up (seated- face, arms)    Lower Body Bathing  Perineal  :  (posterior- total A standing)    Provided education on sequencing, safety and body mechanics with ADL tasks. Unable to complete stnading ADLs secondary to need for constant BUE support on RW. Completed seated ADL tasks with set up and standing with min-mod A for standing support with total A for posterior perineal hygiene. Functional Measure:  Barthel Index:    Bathin  Bladder: 5  Bowels: 5  Groomin  Dressin  Feeding: 10  Mobility: 0  Stairs: 0  Toilet Use: 5  Transfer (Bed to Chair and Back): 10  Total: 40       Barthel and G-code impairment scale:  Percentage of impairment CH  0% CI  1-19% CJ  20-39% CK  40-59% CL  60-79% CM  80-99% CN  100%   Barthel Score 0-100 100 99-80 79-60 59-40 20-39 1-19   0   Barthel Score 0-20 20 17-19 13-16 9-12 5-8 1-4 0      The Barthel ADL Index: Guidelines  1. The index should be used as a record of what a patient does, not as a record of what a patient could do. 2. The main aim is to establish degree of independence from any help, physical or verbal, however minor and for whatever reason. 3. The need for supervision renders the patient not independent. 4. A patient's performance should be established using the best available evidence. Asking the patient, friends/relatives and nurses are the usual sources, but direct observation and common sense are also important. However direct testing is not needed. 5. Usually the patient's performance over the preceding 24-48 hours is important, but occasionally longer periods will be relevant. 6. Middle categories imply that the patient supplies over 50 per cent of the effort. 7. Use of aids to be independent is allowed. Fernando Yoo., Barthel, D.W. (3888). Functional evaluation: the Barthel Index. [address] [facility] (14)2. Ember Shin justina NEHAL Urias, Ammy Khan., Helga Costa., Di, [address] ().  Measuring the change indisability after inpatient rehabilitation; comparison of the responsiveness of the Barthel Index and Functional [place] Measure. Journal of Neurology, Neurosurgery, and Psychiatry, 66(4), 316-834. FATIMAH Jones, DONNIE Mars, & Olimpia Myrick M.A. (2004.) Assessment of post-stroke quality of life in cost-effectiveness studies: The usefulness of the Barthel Index and the EuroQoL-5D. Quality of Life Research, 13, 412-29         G codes: In compliance with CMSs Claims Based Outcome Reporting, the following G-code set was chosen for this patient based on their primary functional limitation being treated: The outcome measure chosen to determine the severity of the functional limitation was the Barthel Index with a score of 40/100 which was correlated with the impairment scale. ? Self Care:     - CURRENT STATUS: CK - 40%-59% impaired, limited or restricted    - GOAL STATUS: CJ - 20%-39% impaired, limited or restricted    - D/C STATUS:  ---------------To be determined---------------     Occupational Therapy Evaluation Charge Determination   History Examination Decision-Making   MEDIUM Complexity : Expanded review of history including physical, cognitive and psychosocial  history  MEDIUM Complexity : 3-5 performance deficits relating to physical, cognitive , or psychosocial skils that result in activity limitations and / or participation restrictions MEDIUM Complexity : Patient may present with comorbidities that affect occupational performnce. Miniml to moderate modification of tasks or assistance (eg, physical or verbal ) with assesment(s) is necessary to enable patient to complete evaluation       Based on the above components, the patient evaluation is determined to be of the following complexity level: MEDIUM  Pain:  Pain Scale 1: Numeric (0 - 10)  Pain Intensity 1: 3  Pain Location 1: Ankle  Pain Orientation 1: Left  Pain Description 1: Sore  Pain Intervention(s) 1: Medication (see MAR); Rest    Activity Tolerance:   Fair- overall.   LImited standing secondary to overall deconditioning and CAM boot. Good rehab potential     After treatment:   [x] Patient left in no apparent distress sitting up in chair  [] Patient left in no apparent distress in bed  [x] Call bell left within reach  [] Nursing notified  [] Caregiver present  [] Bed alarm activated    COMMUNICATION/EDUCATION:   The patients plan of care was discussed with: Physical Therapist, Registered Nurse and patient and case mgmt    [] Home safety education was provided and the patient/caregiver indicated understanding. [x] Patient/family have participated as able in goal setting and plan of care. [x] Patient/family agree to work toward stated goals and plan of care. [] Patient understands intent and goals of therapy, but is neutral about his/her participation. [] Patient is unable to participate in goal setting and plan of care. This patients plan of care is appropriate for delegation to [facility].     Thank you for this referral.  Juju Del Cid OT  Time Calculation: 19 mins

## 2018-08-23 VITALS
TEMPERATURE: 98.6 F | OXYGEN SATURATION: 97 % | HEIGHT: 63 IN | BODY MASS INDEX: 28.12 KG/M2 | WEIGHT: 158.73 LBS | DIASTOLIC BLOOD PRESSURE: 69 MMHG | SYSTOLIC BLOOD PRESSURE: 106 MMHG | HEART RATE: 89 BPM | RESPIRATION RATE: 16 BRPM

## 2018-08-23 PROCEDURE — 74011250637 HC RX REV CODE- 250/637: Performed by: HOSPITALIST

## 2018-08-23 PROCEDURE — 99218 HC RM OBSERVATION: CPT

## 2018-08-23 RX ORDER — OXYCODONE AND ACETAMINOPHEN 5; 325 MG/1; MG/1
1 TABLET ORAL
Qty: 20 TAB | Refills: 0 | Status: SHIPPED | OUTPATIENT
Start: 2018-08-23 | End: 2018-10-10 | Stop reason: ALTCHOICE

## 2018-08-23 RX ADMIN — PANTOPRAZOLE SODIUM 40 MG: 40 TABLET, DELAYED RELEASE ORAL at 07:34

## 2018-08-23 RX ADMIN — OXYCODONE HYDROCHLORIDE AND ACETAMINOPHEN 1 TABLET: 5; 325 TABLET ORAL at 08:41

## 2018-08-23 RX ADMIN — OXYCODONE HYDROCHLORIDE AND ACETAMINOPHEN 1 TABLET: 5; 325 TABLET ORAL at 14:25

## 2018-08-23 RX ADMIN — Medication 10 ML: at 07:35

## 2018-08-23 NOTE — PROGRESS NOTES
Bedside and Verbal shift change report given to Liberty Crawford RN (oncoming nurse) by Jaky Guy RN (offgoing nurse). Report included the following information SBAR, Kardex, Intake/Output, MAR, Accordion and Recent Results.

## 2018-08-23 NOTE — DISCHARGE INSTRUCTIONS
Discharge Summary      PATIENT ID: Michele Browne  MRN: [id number]   YOB: 1955    DATE OF ADMISSION: 8/20/2018  1:35 PM    DATE OF DISCHARGE: 8/23/2018  PRIMARY CARE PROVIDER: Ashely Flores MD   ATTENDING PHYSICIAN: Trino Bethea MD  DISCHARGING PROVIDER: Haroon Burton NP. To contact this individual call [phone] and ask the  to page. If unavailable ask to be transferred the Adult Hospitalist Department.     CONSULTATIONS: IP CONSULT TO [address] [place] [address] Rd:   Pt presented to the ED from home due to left leg pain.  per pt hx, she fell 2 weeks ago. She had called EMS but she then told them it was not an emergency, so was not brought to the ED. Since the fall, she had been finding it even more difficult to move and perform daily tasks. She indicated she usually moved around in a wheelchair and walk on a walker for short distances.  Due to the severe pain in her left leg and inability to stand without possibly falling, she called EMS once again and this time came to ED.     DISCHARGE DIAGNOSES / PLAN:       Closed fracture of the proximal end of the left fibula (POA):   - has been fitted for boot and now WBAT  - Pain management with Norco (noted that prescription was done while pt in ED but did not see paper copy in chart - will print another for nursing facility)  - continue PT/OT  - discharge to skilled nursing today      History of multiple sclerosis: stable at this time.    - Patient stated she does not take Rebif any more, had allergic reaction (skin rash and difficulty breathing), medication removed from meds list.  - She should continue outpatient followup with neurology      Depression: stable, continue home meds         FOLLOW UP APPOINTMENTS:    Follow-up Information     Follow up With Details Comments Contact Info     Ashely Flores MD   or facility provider in 2 weeks [address] [place] [address] [phone]     [facility]   If symptoms worsen [address] [facility]     [address] [address]  [phone]     Dereck Peguero MD In 2 weeks   [facility] [address]  [facility] [address] [facility] [address]  [phone]         ADDITIONAL CARE RECOMMENDATIONS:   Follow up with Orthopedics in 2 weeks                         DIET: Regular Diet     ACTIVITY: Activity as tolerated and PT/OT Eval and Treat  WBAT as tolerated while boot is on  May remove boot for bathing and sleep     EQUIPMENT needed: Boot on while OOB  - additional equipment as per PT/OT     DISCHARGE MEDICATIONS:       Current Discharge Medication List            START taking these medications     Details   HYDROcodone-acetaminophen (NORCO) 5-325 mg per tablet Take 1 Tab by mouth every four (4) hours as needed for Pain. Max Daily Amount: 6 Tabs. Qty: 20 Tab, Refills: 0     Associated Diagnoses: Closed fracture of both fibulae, initial encounter               CONTINUE these medications which have NOT CHANGED     Details   tiZANidine (ZANAFLEX) 4 mg tablet TAKE ONE TABLET BY MOUTH EVERY 8 HOURS AS NEEDED  Qty: 60 Tab, Refills: 0       sertraline (ZOLOFT) 50 mg tablet TAKE ONE TABLET BY MOUTH DAILY  Qty: 30 Tab, Refills: 2       pantoprazole (PROTONIX) 40 mg tablet Take 1 Tab by mouth daily. Qty: 1 Tab, Refills: 0       bisacodyl (DULCOLAX) 10 mg suppository Insert 10 mg into rectum daily as needed. Qty: 1 Each, Refills: 0              NOTIFY YOUR PHYSICIAN FOR ANY OF THE FOLLOWING:   Fever over 101 degrees for 24 hours. Chest pain, shortness of breath, fever, chills, nausea, vomiting, diarrhea, change in mentation, falling, weakness, bleeding. Severe pain or pain not relieved by medications.   Or, any other signs or symptoms that you may have questions about.     DISPOSITION:     Home With:    OT   PT   DARLENE   RN      xx Long term SNF/Inpatient Rehab     Independent/assisted living     Hospice     Other:      PATIENT CONDITION AT DISCHARGE:   Functional status   xx Poor    xx Deconditioned      Independent       Cognition   xx  Lucid      Forgetful      Dementia       Catheters/lines (plus indication)     Leon      PICC      PEG    xx None       Code status   xx  Full code      DNR       PHYSICAL EXAMINATION AT DISCHARGE:  /81 (BP 1 Location: Right arm, BP Patient Position: At rest)  Pulse 85  Temp 98.8 °F (37.1 °C)  Resp 16  Ht 5' 3\" (1.6 m)  Wt 72 kg (158 lb 11.7 oz)  SpO2 98%  BMI 28.12 kg/m2     Pt lying in bed eating lunch. Has no new complaints and aware plans for discharge today.     Constitutional:  No acute distress, cooperative, pleasant    ENT:  Oral mucous membranes moist, oropharynx benign. Resp:  CTA bilaterally. No accessory muscle use, on RA   CV:  Regular rhythm, normal rate, no murmurs    GI:  Soft, non distended, non tender. Normoactive bowel sounds     Musculoskeletal:  left foot in boot, moves toes.     Neurologic:  Moves all extremities.   AAOx3                           Psych:  Good insight, Not anxious nor agitated.     CHRONIC MEDICAL DIAGNOSES:  Problem List as of 8/23/2018  Date Reviewed: 11/30/2017             Codes Class Noted - Resolved     * (Principal)Closed fracture of proximal end of left fibula ICD-10-CM: S82.832A  ICD-9-CM: 823.01   8/20/2018 - Present           Inability to ambulate due to left ankle or foot ICD-10-CM: R26.2  ICD-9-CM: 719.7   8/20/2018 - Present           [facility] ICD-10-CM: G35  ICD-9-CM: 340   2/24/2017 - Present               Greater than 30 minutes were spent with the patient on counseling and coordination of care     Signed:   Gabriella Beckwith NP  8/23/2018  1:29 PM

## 2018-08-23 NOTE — PROGRESS NOTES
Problem: Falls - Risk of  Goal: *Absence of Falls  Document Aquiles Fall Risk and appropriate interventions in the flowsheet.    Outcome: Progressing Towards Goal  Fall Risk Interventions:  Mobility Interventions: Patient to call before getting OOB, Utilize walker, cane, or other assistive device         Medication Interventions: Patient to call before getting OOB, Teach patient to arise slowly    Elimination Interventions: Call light in reach    History of Falls Interventions: Evaluate medications/consider consulting pharmacy

## 2018-08-23 NOTE — DISCHARGE SUMMARY
Discharge Summary     PATIENT ID: Uday Tillman  MRN: [id number]   YOB: 1955    DATE OF ADMISSION: 8/20/2018  1:35 PM    DATE OF DISCHARGE: 8/23/2018  PRIMARY CARE PROVIDER: Ana Lopez MD   ATTENDING PHYSICIAN: Benjamin Mujica MD  DISCHARGING PROVIDER: Raya Spence NP. To contact this individual call [phone] and ask the  to page. If unavailable ask to be transferred the Adult Hospitalist Department. CONSULTATIONS: IP CONSULT TO [address] [facility] COURSE:   Pt presented to the ED from home due to left leg pain.  per pt hx, she fell 2 weeks ago. She had called EMS but she then told them it was not an emergency, so was not brought to the ED. Since the fall, she had been finding it even more difficult to move and perform daily tasks. She indicated she usually moved around in a wheelchair and walk on a walker for short distances.  Due to the severe pain in her left leg and inability to stand without possibly falling, she called EMS once again and this time came to ED.     DISCHARGE DIAGNOSES / PLAN:      Closed fracture of the proximal end of the left fibula (POA):   - has been fitted for boot and now WBAT  - Pain management with Norco (noted that prescription was done while pt in ED but did not see paper copy in chart - will print another for nursing facility)  - continue PT/OT  - discharge to skilled nursing today     History of multiple sclerosis: stable at this time.    - Patient stated she does not take Rebif any more, had allergic reaction (skin rash and difficulty breathing), medication removed from meds list.  - She should continue outpatient followup with neurology     Depression: stable, continue home meds        FOLLOW UP APPOINTMENTS:    Follow-up Information     Follow up With Details Comments Contact Info    Ana Lopez MD  or facility provider in 2 weeks [address] [place] [address]  [phone]      [address] [facility] [address] DEP  If symptoms worsen [address] [facility]    [facility]   [address] Marta Chakraborty MD In 2 weeks  [address] [facility] [address]            ADDITIONAL CARE RECOMMENDATIONS:   Follow up with Orthopedics in 2 weeks     DIET: Regular Diet    ACTIVITY: Activity as tolerated and PT/OT Eval and Treat  WBAT as tolerated while boot is on  May remove boot for bathing and sleep    EQUIPMENT needed: Boot on while OOB  - additional equipment as per PT/OT    DISCHARGE MEDICATIONS:  Current Discharge Medication List      START taking these medications    Details   HYDROcodone-acetaminophen (NORCO) 5-325 mg per tablet Take 1 Tab by mouth every four (4) hours as needed for Pain. Max Daily Amount: 6 Tabs. Qty: 20 Tab, Refills: 0    Associated Diagnoses: Closed fracture of both fibulae, initial encounter         CONTINUE these medications which have NOT CHANGED    Details   tiZANidine (ZANAFLEX) 4 mg tablet TAKE ONE TABLET BY MOUTH EVERY 8 HOURS AS NEEDED  Qty: 60 Tab, Refills: 0      sertraline (ZOLOFT) 50 mg tablet TAKE ONE TABLET BY MOUTH DAILY  Qty: 30 Tab, Refills: 2      pantoprazole (PROTONIX) 40 mg tablet Take 1 Tab by mouth daily. Qty: 1 Tab, Refills: 0      bisacodyl (DULCOLAX) 10 mg suppository Insert 10 mg into rectum daily as needed. Qty: 1 Each, Refills: 0           NOTIFY YOUR PHYSICIAN FOR ANY OF THE FOLLOWING:   Fever over 101 degrees for 24 hours. Chest pain, shortness of breath, fever, chills, nausea, vomiting, diarrhea, change in mentation, falling, weakness, bleeding. Severe pain or pain not relieved by medications. Or, any other signs or symptoms that you may have questions about.     DISPOSITION:    Home With:   OT  PT  HH  RN      xx Long term SNF/Inpatient Rehab    Independent/assisted living    Hospice    Other:     PATIENT CONDITION AT DISCHARGE:   Functional status   xx Poor    xx Deconditioned     Independent      Cognition   xx  Lucid     Forgetful     Dementia      Catheters/lines (plus indication)    Leon     PICC     PEG    xx None      Code status   xx  Full code     DNR      PHYSICAL EXAMINATION AT DISCHARGE:  /81 (BP 1 Location: Right arm, BP Patient Position: At rest)  Pulse 85  Temp 98.8 °F (37.1 °C)  Resp 16  Ht 5' 3\" (1.6 m)  Wt 72 kg (158 lb 11.7 oz)  SpO2 98%  BMI 28.12 kg/m2    Pt lying in bed eating lunch. Has no new complaints and aware plans for discharge today. Constitutional:  No acute distress, cooperative, pleasant    ENT:  Oral mucous membranes moist, oropharynx benign. Resp:  CTA bilaterally. No accessory muscle use, on RA   CV:  Regular rhythm, normal rate, no murmurs    GI:  Soft, non distended, non tender. Normoactive bowel sounds     Musculoskeletal:  left foot in boot, moves toes. Neurologic:  Moves all extremities. AAOx3                           Psych:  Good insight, Not anxious nor agitated.     CHRONIC MEDICAL DIAGNOSES:  Problem List as of 8/23/2018  Date Reviewed: 11/30/2017          Codes Class Noted - Resolved    * (Principal)Closed fracture of proximal end of left fibula ICD-10-CM: S82.832A  ICD-9-CM: 823.01  8/20/2018 - Present        Inability to ambulate due to left ankle or foot ICD-10-CM: R26.2  ICD-9-CM: 719.7  8/20/2018 - Present        Multiple sclerosis exacerbation ([facility] ICD-10-CM: G35  ICD-9-CM: 633  2/24/2017 - Present            Greater than 30 minutes were spent with the patient on counseling and coordination of care    Signed:   Raya Spence NP  8/23/2018  1:29 PM

## 2018-08-23 NOTE — PROGRESS NOTES
The patient will discharge today to [facility] SNF at 1:30pm. Via [facility]. CRM will do the [facility]  And submit to Medicaid. Helena from the facility is aware. Discharge orders pending. CARA Miller was contacted regarding the discharge. She is the patient's  with the [employer].   [phone]

## 2018-08-24 NOTE — PROGRESS NOTES
I called Frankie Dobbs at [phone] and gave verbal report to Shalonda Conley. All questions were answered and Shalonda Conley stated she would call our unit if she had any further questions.

## 2018-10-10 ENCOUNTER — OFFICE VISIT (OUTPATIENT)
Dept: INTERNAL MEDICINE CLINIC | Age: 63
End: 2018-10-10

## 2018-10-10 VITALS — HEIGHT: 63 IN

## 2018-10-10 DIAGNOSIS — R31.9 URINARY TRACT INFECTION WITH HEMATURIA, SITE UNSPECIFIED: ICD-10-CM

## 2018-10-10 DIAGNOSIS — S82.832A CLOSED FRACTURE OF PROXIMAL END OF LEFT FIBULA, UNSPECIFIED FRACTURE MORPHOLOGY, INITIAL ENCOUNTER: ICD-10-CM

## 2018-10-10 DIAGNOSIS — M94.9 DISORDER OF BONE AND CARTILAGE: ICD-10-CM

## 2018-10-10 DIAGNOSIS — Z13.6 SCREENING FOR ISCHEMIC HEART DISEASE: ICD-10-CM

## 2018-10-10 DIAGNOSIS — Z12.31 ENCOUNTER FOR SCREENING MAMMOGRAM FOR MALIGNANT NEOPLASM OF BREAST: ICD-10-CM

## 2018-10-10 DIAGNOSIS — N39.0 URINARY TRACT INFECTION WITH HEMATURIA, SITE UNSPECIFIED: ICD-10-CM

## 2018-10-10 DIAGNOSIS — M89.9 DISORDER OF BONE AND CARTILAGE: ICD-10-CM

## 2018-10-10 DIAGNOSIS — Z12.11 SCREEN FOR COLON CANCER: ICD-10-CM

## 2018-10-10 DIAGNOSIS — Z13.1 SCREENING FOR DIABETES MELLITUS: ICD-10-CM

## 2018-10-10 DIAGNOSIS — R35.0 URINARY FREQUENCY: ICD-10-CM

## 2018-10-10 DIAGNOSIS — Z00.00 MEDICARE ANNUAL WELLNESS VISIT, SUBSEQUENT: Primary | ICD-10-CM

## 2018-10-10 DIAGNOSIS — Z23 ENCOUNTER FOR IMMUNIZATION: ICD-10-CM

## 2018-10-10 DIAGNOSIS — Z13.39 SCREENING FOR ALCOHOLISM: ICD-10-CM

## 2018-10-10 DIAGNOSIS — Z13.31 SCREENING FOR DEPRESSION: ICD-10-CM

## 2018-10-10 LAB
BILIRUB UR QL STRIP: NEGATIVE
GLUCOSE UR-MCNC: NEGATIVE MG/DL
KETONES P FAST UR STRIP-MCNC: NEGATIVE MG/DL
PH UR STRIP: 6 [PH] (ref 4.6–8)
PROT UR QL STRIP: NEGATIVE
SP GR UR STRIP: 1.02 (ref 1–1.03)
UA UROBILINOGEN AMB POC: NORMAL (ref 0.2–1)
URINALYSIS CLARITY POC: CLEAR
URINALYSIS COLOR POC: YELLOW
URINE BLOOD POC: NORMAL
URINE LEUKOCYTES POC: NORMAL
URINE NITRITES POC: NEGATIVE

## 2018-10-10 RX ORDER — SULFAMETHOXAZOLE AND TRIMETHOPRIM 800; 160 MG/1; MG/1
1 TABLET ORAL 2 TIMES DAILY
Qty: 20 TAB | Refills: 0 | Status: SHIPPED | OUTPATIENT
Start: 2018-10-10 | End: 2018-10-20

## 2018-10-10 NOTE — LETTER
10/22/2018 1:11 PM 
 
Ms. Janae Betancourt [address] [place] [address] [address] [id number] Dear Janae Betancourt: Please find your most recent results below. Resulted Orders AMB POC URINALYSIS DIP STICK AUTO W/O MICRO Result Value Ref Range Color (UA POC) Yellow Clarity (UA POC) Clear Glucose (UA POC) Negative Negative Bilirubin (UA POC) Negative Negative Ketones (UA POC) Negative Negative Specific gravity (UA POC) 1.020 1.001 - 1.035 Blood (UA POC) Trace Negative pH (UA POC) 6 4.6 - 8.0 Protein (UA POC) Negative Negative Urobilinogen (UA POC) 0.2 mg/dL 0.2 - 1 Nitrites (UA POC) Negative Negative Leukocyte esterase (UA POC) 1+ Negative CULTURE, URINE Result Value Ref Range Urine Culture, Routine Mixed urogenital debra 
25,000-50,000 colony forming units per mL Narrative Performed at:  [address] [place] [address]  [id number] : Beryle Litten MD, Phone:  [phone] OCCULT BLOOD IMMUNOASSAY,DIAGNOSTIC Result Value Ref Range Occult blood fecal, by IA Negative Negative Narrative Performed at:  [address]  [id number] : Reina Priest MD, Phone:  [phone] LIPID PANEL Result Value Ref Range Cholesterol, total 247 (H) 100 - 199 mg/dL Triglyceride 203 (H) 0 - 149 mg/dL HDL Cholesterol 53 >39 mg/dL Comment: **Effective October 22, 2018, HDL Cholesterol** 
  reference interval will be changing to: Male        Female Go 24 VLDL, calculated 41 (H) 5 - 40 mg/dL LDL, calculated 153 (H) 0 - 99 mg/dL Narrative Performed at:  [address]  [id number] : Reina Priest MD, Phone:  [phone] GLUCOSE, RANDOM Result Value Ref Range Glucose 82 65 - 99 mg/dL Narrative Performed at:  [address]  [id number] : Fabby Rosas MD, Phone:  [phone] SPECIMEN STATUS REPORT Result Value Ref Range SPECIMEN STATUS REPORT COMMENT Comment:  
   No Test Indicated A lavender top tube was received with no test indicated Dear Doctor, The requisition we received for the above patient has no test 
indicated on the request form for one or more of the specimens 
submitted. The [place] Code of [employer] requires a 
written and signed request be forwarded to the testing laboratory 
following the verbal order of a laboratory test. 
Date:_________________________________ ICD-9/10 Diagnosis Code(s):___________________________________________ Physician or Authorized Designee Signature: 
______________________________________________________________________ Your signature confirms your order of the test(s) listed Required test name(s):________________________________________________ Required test number(s):______________________________________________ Please provide requested information and fax to [phone] 
to expedite testing. Narrative Performed at:  [address]  [id number] : Fabby Rosas MD, Phone:  [phone] RECOMMENDATIONS: 
None. Keep up the good work! Please call me if you have any questions: [phone] Sincerely, Katerina Garner MD

## 2018-10-10 NOTE — PATIENT INSTRUCTIONS
Medicare Wellness Visit, Female The best way to live healthy is to have a lifestyle where you eat a well-balanced diet, exercise regularly, limit alcohol use, and quit all forms of tobacco/nicotine, if applicable. Regular preventive services are another way to keep healthy. Preventive services (vaccines, screening tests, monitoring & exams) can help personalize your care plan, which helps you manage your own care. Screening tests can find health problems at the earliest stages, when they are easiest to treat. [facility] follows the current, evidence-based guidelines published by the [facility] Jose Andrew ([facility]) when recommending preventive services for our patients. Because we follow these guidelines, sometimes recommendations change over time as research supports it. (For example, mammograms used to be recommended annually. Even though Medicare will still pay for an annual mammogram, the newer guidelines recommend a mammogram every two years for women of average risk.) Of course, you and your doctor may decide to screen more often for some diseases, based on your risk and your health status. Preventive services for you include: - Medicare offers their members a free annual wellness visit, which is time for you and your primary care provider to discuss and plan for your preventive service needs. Take advantage of this benefit every year! 
-All adults over the age of 72 should receive the recommended pneumonia vaccines. Current USPSTF guidelines recommend a series of two vaccines for the best pneumonia protection.  
-All adults should have a flu vaccine yearly and a tetanus vaccine every 10 years. All adults age 61 and older should receive a shingles vaccine once in their lifetime.   
-A bone mass density test is recommended when a woman turns 65 to screen for osteoporosis. This test is only recommended one time, as a screening. Some providers will use this same test as a disease monitoring tool if you already have osteoporosis. -All adults age 38-68 who are overweight should have a diabetes screening test once every three years.  
-Other screening tests and preventive services for persons with diabetes include: an eye exam to screen for diabetic retinopathy, a kidney function test, a foot exam, and stricter control over your cholesterol.  
-Cardiovascular screening for adults with routine risk involves an electrocardiogram (ECG) at intervals determined by your doctor.  
-Colorectal cancer screenings should be done for adults age 54-65 with no increased risk factors for colorectal cancer. There are a number of acceptable methods of screening for this type of cancer. Each test has its own benefits and drawbacks. Discuss with your doctor what is most appropriate for you during your annual wellness visit. The different tests include: colonoscopy (considered the best screening method), a fecal occult blood test, a fecal DNA test, and sigmoidoscopy. -Breast cancer screenings are recommended every other year for women of normal risk, age 54-69. 
-Cervical cancer screenings for women over age 72 are only recommended with certain risk factors.  
-All adults born between [facility] should be screened once for Hepatitis C. Here is a list of your current Health Maintenance items (your personalized list of preventive services) with a due date: 
Health Maintenance Due Topic Date Due  Shingles Vaccine (1 of 2) 08/20/2003  Breast Cancer Screening  08/31/2013  DTaP/Tdap/Td  (1 - Tdap) 07/20/2016 Sara.Sic Annual Well Visit  03/14/2018  Flu Vaccine  08/01/2018  Glaucoma Screening   08/20/2018  Bone Mineral Density   08/20/2018  Pneumococcal Vaccine (1 of 2 - PCV13) 08/20/2018  Stool testing for trace blood  11/30/2018 Medicare Wellness Visit, Female The best way to live healthy is to have a lifestyle where you eat a well-balanced diet, exercise regularly, limit alcohol use, and quit all forms of tobacco/nicotine, if applicable. Regular preventive services are another way to keep healthy. Preventive services (vaccines, screening tests, monitoring & exams) can help personalize your care plan, which helps you manage your own care. Screening tests can find health problems at the earliest stages, when they are easiest to treat. [facility] follows the current, evidence-based guidelines published by the [facility] Jose Morales (USPSTF) when recommending preventive services for our patients. Because we follow these guidelines, sometimes recommendations change over time as research supports it. (For example, mammograms used to be recommended annually. Even though Medicare will still pay for an annual mammogram, the newer guidelines recommend a mammogram every two years for women of average risk.) Of course, you and your doctor may decide to screen more often for some diseases, based on your risk and your health status. Preventive services for you include: - Medicare offers their members a free annual wellness visit, which is time for you and your primary care provider to discuss and plan for your preventive service needs. Take advantage of this benefit every year! 
-All adults over the age of 72 should receive the recommended pneumonia vaccines. Current USPSTF guidelines recommend a series of two vaccines for the best pneumonia protection.  
-All adults should have a flu vaccine yearly and a tetanus vaccine every 10 years. All adults age 61 and older should receive a shingles vaccine once in their lifetime.   
-A bone mass density test is recommended when a woman turns 65 to screen for osteoporosis. This test is only recommended one time, as a screening.  Some providers will use this same test as a disease monitoring tool if you already have osteoporosis. -All adults age 38-68 who are overweight should have a diabetes screening test once every three years.  
-Other screening tests and preventive services for persons with diabetes include: an eye exam to screen for diabetic retinopathy, a kidney function test, a foot exam, and stricter control over your cholesterol.  
-Cardiovascular screening for adults with routine risk involves an electrocardiogram (ECG) at intervals determined by your doctor.  
-Colorectal cancer screenings should be done for adults age 54-65 with no increased risk factors for colorectal cancer. There are a number of acceptable methods of screening for this type of cancer. Each test has its own benefits and drawbacks. Discuss with your doctor what is most appropriate for you during your annual wellness visit. The different tests include: colonoscopy (considered the best screening method), a fecal occult blood test, a fecal DNA test, and sigmoidoscopy. -Breast cancer screenings are recommended every other year for women of normal risk, age 54-69. 
-Cervical cancer screenings for women over age 72 are only recommended with certain risk factors.  
-All adults born between [facility] should be screened once for Hepatitis C. Here is a list of your current Health Maintenance items (your personalized list of preventive services) with a due date: 
Health Maintenance Due Topic Date Due  Shingles Vaccine (1 of 2) 08/20/2003  Breast Cancer Screening  08/31/2013  DTaP/Tdap/Td  (1 - Tdap) 07/20/2016 [address] [place] [address] Annual Well Visit  03/14/2018  Flu Vaccine  08/01/2018  Glaucoma Screening   08/20/2018  Bone Mineral Density   08/20/2018  Pneumococcal Vaccine (1 of 2 - PCV13) 08/20/2018  Stool testing for trace blood  11/30/2018

## 2018-10-10 NOTE — MR AVS SNAPSHOT
[address] [place] [address]. [id number] 
[phone] Patient: Rubia Willett MRN: [id number] HTA:5/07/2812 Visit Information Date & Time Provider Department Dept. Phone Encounter #  
 10/10/2018  2:00 PM Anoop Russell MD [facility] [phone] [id number] Upcoming Health Maintenance Date Due Shingrix Vaccine Age 50> (1 of 2) 8/20/2003 BREAST CANCER SCRN MAMMOGRAM 8/31/2013 DTaP/Tdap/Td series (1 - Tdap) 7/20/2016 MEDICARE YEARLY EXAM 3/14/2018 Influenza Age 5 to Adult 8/1/2018 GLAUCOMA SCREENING Q2Y 8/20/2018 Bone Densitometry (Dexa) Screening 8/20/2018 Pneumococcal 65+ Low/Medium Risk (1 of 2 - PCV13) 8/20/2018 FOBT Q 1 YEAR AGE 50-75 11/30/2018 Allergies as of 10/10/2018  Review Complete On: 10/10/2018 By: Anoop Russell MD  
 No Known Allergies Current Immunizations  Never Reviewed Name Date Influenza High Dose Vaccine PF  Incomplete Td, Adsorbed PF 7/19/2016 Not reviewed this visit You Were Diagnosed With   
  
 Codes Comments Medicare annual wellness visit, subsequent    -  Primary ICD-10-CM: Z00.00 ICD-9-CM: V70.0 Closed fracture of proximal end of left fibula, unspecified fracture morphology, initial encounter     ICD-10-CM: X89.392U ICD-9-CM: 823.01 Encounter for immunization     ICD-10-CM: N57 ICD-9-CM: V03.89 Screening for alcoholism     ICD-10-CM: Z13.39 
ICD-9-CM: V79.1 Screening for depression     ICD-10-CM: Z13.31 
ICD-9-CM: V79.0 Screen for colon cancer     ICD-10-CM: Z12.11 ICD-9-CM: V76.51 Encounter for screening mammogram for malignant neoplasm of breast     ICD-10-CM: Z12.31 
ICD-9-CM: V76.12 Disorder of bone and cartilage     ICD-10-CM: M89.9, M94.9 ICD-9-CM: 733.90 Urinary frequency     ICD-10-CM: R35.0 ICD-9-CM: 788.41 Screening for ischemic heart disease     ICD-10-CM: Z13.6 ICD-9-CM: V81.0 Screening for diabetes mellitus     ICD-10-CM: Z13.1 ICD-9-CM: V77.1 Urinary tract infection with hematuria, site unspecified     ICD-10-CM: N39.0, R31.9 ICD-9-CM: 599.0, 599.70 Vitals Height(growth percentile) OB Status Smoking Status 5' 3\" (1.6 m) Hysterectomy Former Smoker Vitals History Preferred Pharmacy Pharmacy Name Phone [facility] [id number] [facility] [place] [phone] Your Updated Medication List  
  
   
This list is accurate as of 10/10/18  2:19 PM.  Always use your most recent med list.  
  
  
  
  
 bisacodyl 10 mg suppository Commonly known as:  DULCOLAX Insert 10 mg into rectum daily as needed. pantoprazole 40 mg tablet Commonly known as:  PROTONIX Take 1 Tab by mouth daily. pneumococcal 13 angela conj dip 0.5 mL Syrg injection Commonly known as:  PREVNAR-13  
0.5 mL by IntraMUSCular route once for 1 dose. sertraline 50 mg tablet Commonly known as:  ZOLOFT  
TAKE ONE TABLET BY MOUTH DAILY  
  
 tiZANidine 4 mg tablet Commonly known as:  Vivica Greener TAKE ONE TABLET BY MOUTH EVERY 8 HOURS AS NEEDED  
  
 trimethoprim-sulfamethoxazole 160-800 mg per tablet Commonly known as:  BACTRIM DS, SEPTRA DS Take 1 Tab by mouth two (2) times a day for 10 days. Prescriptions Sent to Pharmacy Refills  
 pneumococcal 13 angela conj dip (PREVNAR-13) 0.5 mL syrg injection 0 Si.5 mL by IntraMUSCular route once for 1 dose. Class: Normal  
 Pharmacy: [facility] [id number] [facility], [place] Ph #: [phone] Route: IntraMUSCular  
 trimethoprim-sulfamethoxazole (BACTRIM DS, SEPTRA DS) 160-800 mg per tablet 0 Sig: Take 1 Tab by mouth two (2) times a day for 10 days. Class: Normal  
 Pharmacy: [facility] [id number] [facility] [place] Ph #: [phone] Route: Oral  
  
We Performed the Following AMB POC URINALYSIS DIP STICK AUTO W/O MICRO [82298 CPT(R)] CULTURE, URINE [id number] CPT(R)] [address] [[id number] HCPCS] GLUCOSE, RANDOM [id number] CPT(R)] INFLUENZA VIRUS VACCINE, HIGH DOSE SEASONAL, PRESERVATIVE FREE [06156 CPT(R)] LIPID PANEL [91660 CPT(R)] OCCULT BLOOD IMMUNOASSAY,DIAGNOSTIC [23198 CPT(R)] NM ANNUAL ALCOHOL SCREEN 15 MIN [id number] HCPCS] NM IMMUNIZ ADMIN,1 SINGLE/COMB VAC/TOXOID [id number] CPT(R)] REFERRAL TO UROLOGY [DSZ155 Custom] To-Do List   
 10/13/2018 Imaging:  DEXA BONE DENSITY STUDY AXIAL   
  
 10/13/2018 Imaging:  FRANCHESCA MAMMO BI SCREENING INCL CAD Referral Information Referral ID Referred By Referred To  
  
 [id number] Mukesh MCCLELLAN MD   
   [address] [facility] [address] [place], [address] [place] [address] Phone: [phone] Fax: [phone] Visits Status Start Date End Date 1 New Request 10/10/18 10/10/19 If your referral has a status of pending review or denied, additional information will be sent to support the outcome of this decision. Patient Instructions Medicare Wellness Visit, Female The best way to live healthy is to have a lifestyle where you eat a well-balanced diet, exercise regularly, limit alcohol use, and quit all forms of tobacco/nicotine, if applicable. Regular preventive services are another way to keep healthy. Preventive services (vaccines, screening tests, monitoring & exams) can help personalize your care plan, which helps you manage your own care. Screening tests can find health problems at the earliest stages, when they are easiest to treat. [facility] follows the current, evidence-based guidelines published by the [place] (USPSTF) when recommending preventive services for our patients.  Because we follow these guidelines, sometimes recommendations change over time as research supports it. (For example, mammograms used to be recommended annually. Even though Medicare will still pay for an annual mammogram, the newer guidelines recommend a mammogram every two years for women of average risk.) Of course, you and your doctor may decide to screen more often for some diseases, based on your risk and your health status. Preventive services for you include: - Medicare offers their members a free annual wellness visit, which is time for you and your primary care provider to discuss and plan for your preventive service needs. Take advantage of this benefit every year! 
-All adults over the age of 72 should receive the recommended pneumonia vaccines. Current USPSTF guidelines recommend a series of two vaccines for the best pneumonia protection.  
-All adults should have a flu vaccine yearly and a tetanus vaccine every 10 years. All adults age 61 and older should receive a shingles vaccine once in their lifetime.   
-A bone mass density test is recommended when a woman turns 65 to screen for osteoporosis. This test is only recommended one time, as a screening. Some providers will use this same test as a disease monitoring tool if you already have osteoporosis. -All adults age 38-68 who are overweight should have a diabetes screening test once every three years.  
-Other screening tests and preventive services for persons with diabetes include: an eye exam to screen for diabetic retinopathy, a kidney function test, a foot exam, and stricter control over your cholesterol.  
-Cardiovascular screening for adults with routine risk involves an electrocardiogram (ECG) at intervals determined by your doctor.  
-Colorectal cancer screenings should be done for adults age 54-65 with no increased risk factors for colorectal cancer. There are a number of acceptable methods of screening for this type of cancer. Each test has its own benefits and drawbacks.  Discuss with your doctor what is most appropriate for you during your annual wellness visit. The different tests include: colonoscopy (considered the best screening method), a fecal occult blood test, a fecal DNA test, and sigmoidoscopy. -Breast cancer screenings are recommended every other year for women of normal risk, age 54-69. 
-Cervical cancer screenings for women over age 72 are only recommended with certain risk factors.  
-All adults born between [facility] should be screened once for Hepatitis C. Here is a list of your current Health Maintenance items (your personalized list of preventive services) with a due date: 
Health Maintenance Due Topic Date Due  Shingles Vaccine (1 of 2) 08/20/2003  Breast Cancer Screening  08/31/2013  DTaP/Tdap/Td  (1 - Tdap) 07/20/2016 [address] [place] [address] Annual Well Visit  03/14/2018  Flu Vaccine  08/01/2018  Glaucoma Screening   08/20/2018  Bone Mineral Density   08/20/2018  Pneumococcal Vaccine (1 of 2 - PCV13) 08/20/2018  Stool testing for trace blood  11/30/2018 Medicare Wellness Visit, Female The best way to live healthy is to have a lifestyle where you eat a well-balanced diet, exercise regularly, limit alcohol use, and quit all forms of tobacco/nicotine, if applicable. Regular preventive services are another way to keep healthy. Preventive services (vaccines, screening tests, monitoring & exams) can help personalize your care plan, which helps you manage your own care. Screening tests can find health problems at the earliest stages, when they are easiest to treat. [facility] follows the current, evidence-based guidelines published by the [place] (USPSTF) when recommending preventive services for our patients. Because we follow these guidelines, sometimes recommendations change over time as research supports it. (For example, mammograms used to be recommended annually. Even though Medicare will still pay for an annual mammogram, the newer guidelines recommend a mammogram every two years for women of average risk.) Of course, you and your doctor may decide to screen more often for some diseases, based on your risk and your health status. Preventive services for you include: - Medicare offers their members a free annual wellness visit, which is time for you and your primary care provider to discuss and plan for your preventive service needs. Take advantage of this benefit every year! 
-All adults over the age of 72 should receive the recommended pneumonia vaccines. Current USPSTF guidelines recommend a series of two vaccines for the best pneumonia protection.  
-All adults should have a flu vaccine yearly and a tetanus vaccine every 10 years. All adults age 61 and older should receive a shingles vaccine once in their lifetime.   
-A bone mass density test is recommended when a woman turns 65 to screen for osteoporosis. This test is only recommended one time, as a screening. Some providers will use this same test as a disease monitoring tool if you already have osteoporosis. -All adults age 38-68 who are overweight should have a diabetes screening test once every three years.  
-Other screening tests and preventive services for persons with diabetes include: an eye exam to screen for diabetic retinopathy, a kidney function test, a foot exam, and stricter control over your cholesterol.  
-Cardiovascular screening for adults with routine risk involves an electrocardiogram (ECG) at intervals determined by your doctor.  
-Colorectal cancer screenings should be done for adults age 54-65 with no increased risk factors for colorectal cancer. There are a number of acceptable methods of screening for this type of cancer. Each test has its own benefits and drawbacks. Discuss with your doctor what is most appropriate for you during your annual wellness visit.  The different tests include: colonoscopy (considered the best screening method), a fecal occult blood test, a fecal DNA test, and sigmoidoscopy. -Breast cancer screenings are recommended every other year for women of normal risk, age 54-69. 
-Cervical cancer screenings for women over age 72 are only recommended with certain risk factors.  
-All adults born between [facility] should be screened once for Hepatitis C. Here is a list of your current Health Maintenance items (your personalized list of preventive services) with a due date: 
Health Maintenance Due Topic Date Due  Shingles Vaccine (1 of 2) 08/20/2003  Breast Cancer Screening  08/31/2013  DTaP/Tdap/Td  (1 - Tdap) 07/20/2016 [facility] Annual Well Visit  03/14/2018  Flu Vaccine  08/01/2018  Glaucoma Screening   08/20/2018  Bone Mineral Density   08/20/2018  Pneumococcal Vaccine (1 of 2 - PCV13) 08/20/2018  Stool testing for trace blood  11/30/2018 Introducing [facility]! [employer] introduces [employer] patient portal. Now you can access parts of your medical record, email your doctor's office, and request medication refills online. 1. In your internet browser, go to [email or web address] 2. Click on the First Time User? Click Here link in the Sign In box. You will see the New Member Sign Up page. 3. Enter your [employer] Access Code exactly as it appears below. You will not need to use this code after youve completed the sign-up process. If you do not sign up before the expiration date, you must request a new code. · [employer] Access Code: [email or web address] Expires: 11/18/2018  1:40 PM 
 
4. Enter the last four digits of your Social Security Number (xxxx) and Date of Birth (mm/dd/yyyy) as indicated and click Submit. You will be taken to the next sign-up page. 5. Create a [employer] ID. This will be your [employer] login ID and cannot be changed, so think of one that is secure and easy to remember. 6. Create a Italia Online password. You can change your password at any time. 7. Enter your Password Reset Question and Answer. This can be used at a later time if you forget your password. 8. Enter your e-mail address. You will receive e-mail notification when new information is available in [address] 9. Click Sign Up. You can now view and download portions of your medical record. 10. Click the Download Summary menu link to download a portable copy of your medical information. If you have questions, please visit the Frequently Asked Questions section of the It[employer] website. Remember, It[employer] Online is NOT to be used for urgent needs. For medical emergencies, dial 911. Now available from your iPhone and Android! Please provide this summary of care documentation to your next provider. Your primary care clinician is listed as Kiran Kenny. If you have any questions after today's visit, please call [phone].

## 2018-10-10 NOTE — PROGRESS NOTES
Chief Complaint Patient presents with  Leg Pain  
 
she is a 70y.o. year old female who presents for follow up of injury. Follow Up Pain Assessment Encounter Onset of Symptoms: a couple weeks ago 
________________________________________________________________________ Description: Pain is now better since being in the hospital and rehab center. Patient is here to follow up with Dr. Shaina Gallegos. Pain Scale:(1-10): 8 Duration:  intermittent Radiation: none What makes it better?: ice, OTC meds and rest 
What makes it worse?:certain movements and positoins Medications tried: acetaminophen, ibuprofen Modalities tried: rehab and boot Reviewed and agree with Nurse Note and duplicated in this note. Reviewed PmHx, RxHx, FmHx, SocHx, AllgHx and updated and dated in the chart. History reviewed. No pertinent family history. Past Medical History:  
Diagnosis Date  Multiple sclerosis ([facility] [address]  Neurological disorder Multiple sclerosis, in remission 2011  Other ill-defined conditions(799.89) Mitral Value Prolapse Social History Social History  Marital status: SINGLE Spouse name: N/A  
 Number of children: N/A  
 Years of education: N/A Social History Main Topics  Smoking status: Former Smoker  Smokeless tobacco: Never Used  Alcohol use Yes Comment: social  
 Drug use: Yes Special: Marijuana Comment: occasional marijuana  Sexual activity: Not Currently Other Topics Concern  None Social History Narrative Review of Systems - negative except as listed above Objective:  
 
Vitals:  
 10/10/18 1319 Height: 5' 3\" (1.6 m) Physical Examination: General appearance - alert, well appearing, and in no distress Eyes - pupils equal and reactive, extraocular eye movements intact Ears - bilateral TM's and external ear canals normal 
Nose - normal and patent, no erythema, discharge or polyps Mouth - mucous membranes moist, pharynx normal without lesions Neck - supple, no significant adenopathy Chest - clear to auscultation, no wheezes, rales or rhonchi, symmetric air entry Heart - normal rate, regular rhythm, normal S1, S2, no murmurs, rubs, clicks or gallops Abdomen - soft, nontender, nondistended, no masses or organomegaly Back exam - full range of motion, no tenderness, palpable spasm or pain on motion Neurological - alert, oriented, normal speech, no focal findings or movement disorder noted Musculoskeletal - no joint tenderness, deformity or swelling Extremities - peripheral pulses normal, no pedal edema, no clubbing or cyanosis Skin - normal coloration and turgor, no rashes, no suspicious skin lesions noted Assessment/ Plan:  
Diagnoses and all orders for this visit: 
 
1. Medicare annual wellness visit, subsequent 2. Closed fracture of proximal end of left fibula, unspecified fracture morphology, initial encounter 3. Encounter for immunization -     Influenza virus vaccine ([address]) 72 years and older -     [place] IMMUNIZ ADMIN,1 SINGLE/COMB VAC/TOXOID 4. Screening for alcoholism -     Annual  Alcohol Screen 15 min () 5. Screening for depression -     Depression Screen Annual 
 
6. Screen for colon cancer 7. Encounter for screening mammogram for malignant neoplasm of breast 
-     Bilateral Digital Screening Mammography; Future 8. Disorder of bone and cartilage -     Dexa Bone Density Study Axial ([id number]); Future 9. Urinary frequency -     AMB POC URINALYSIS DIP STICK AUTO W/O MICRO 
-     CULTURE, URINE 
-     REFERRAL TO UROLOGY Follow-up Disposition: Not on File Pathophysiology, recovery and rehabilitation process discussed and questions answered Counseling for 30 Minutes of the total visit duration Pictures and figures used as necessary Provided reassurance Monitor response to home exercises Recommend activity modification Recommend  lower impact activities-walking, Eliptical, [employer], cycling or swimming Patient was informed/counseled on: There is no height or weight on file to calculate BMI. 1) Remember to stay active and/or exercise regularly (I suggest 30-45 minutes daily) 2) For reliable dietary information, go to www. EATRIGHT.org. You may wish to consider seeing the nutritionist at [facility] [phone], also consider the [address] I have discussed the diagnosis with the patient and the intended plan as seen in the above orders. The patient has received an after-visit summary and questions were answered concerning future plans. Medication Side Effects and Warnings were discussed with patient: yes Patient Labs were reviewed and or requested: yes Patient Past Records were reviewed and or requested  yes I have discussed the diagnosis with the patient and the intended plan as seen in the above orders. The patient has received an after-visit summary and questions were answered concerning future plans. Pt agrees to call or return to clinic and/or go to closest ER with any worsening of symptoms. This may include, but not limited to increased fever (>100.4) with NSAIDS or Tylenol, increased edema, confusion, rash, worsening of presenting symptoms. This is the Subsequent Medicare Annual Wellness Exam, performed 12 months or more after the Initial AWV or the last Subsequent AWV I have reviewed the patient's medical history in detail and updated the computerized patient record. History Past Medical History:  
Diagnosis Date  Multiple sclerosis ([facility] [address]  Neurological disorder Multiple sclerosis, in remission 2011  Other ill-defined conditions(799.89) Mitral Value Prolapse Past Surgical History:  
Procedure Laterality Date  ABDOMEN SURGERY PROC UNLISTED    
 hernia repair  HX HYSTERECTOMY Current Outpatient Prescriptions Medication Sig Dispense Refill  tiZANidine (ZANAFLEX) 4 mg tablet TAKE ONE TABLET BY MOUTH EVERY 8 HOURS AS NEEDED 60 Tab 0  
 sertraline (ZOLOFT) 50 mg tablet TAKE ONE TABLET BY MOUTH DAILY 30 Tab 2  
 pantoprazole (PROTONIX) 40 mg tablet Take 1 Tab by mouth daily. 1 Tab 0  
 bisacodyl (DULCOLAX) 10 mg suppository Insert 10 mg into rectum daily as needed. 1 Each 0 No Known Allergies History reviewed. No pertinent family history. Social History Substance Use Topics  Smoking status: Former Smoker  Smokeless tobacco: Never Used  Alcohol use Yes Comment: social  
 
Patient Active Problem List  
Diagnosis Code  Multiple sclerosis exacerbation ([facility] [address] G35  Closed fracture of proximal end of left fibula S82.832A  Inability to ambulate due to left ankle or foot R26.2 Depression Risk Factor Screening: PHQ over the last two weeks 10/10/2018 PHQ Not Done - Little interest or pleasure in doing things Not at all Feeling down, depressed, irritable, or hopeless Nearly every day Total Score PHQ 2 3 Alcohol Risk Factor Screening: On any occasion in the past three months you have had more than 3 drinks containing alcohol. Functional Ability and Level of Safety:  
Hearing Loss Hearing is good. Activities of Daily Living The home contains: handrails and grab bars Patient needs help with:  transportation, dressing, bathing, bathroom needs and walking Fall Risk Fall Risk Assessment, last 12 mths 10/10/2018 Able to walk? Yes Fall in past 12 months? Yes Fall with injury? Yes  
Number of falls in past 12 months 3 Fall Risk Score 4 Abuse Screen Patient is not abused Cognitive Screening Evaluation of Cognitive Function: 
Has your family/caregiver stated any concerns about your memory: no 
Normal 
 
Patient Care Team  
Patient Care Team: 
Miguel Black MD as PCP - General (Family Practice) Ashley Moffett RN as Ambulatory Care Navigator Assessment/Plan Education and counseling provided: 
Are appropriate based on today's review and evaluation Diagnoses and all orders for this visit: 1. Encounter for immunization -     Influenza virus vaccine ([address]) 72 years and older -     [place] IMMUNIZ ADMIN,1 SINGLE/COMB VAC/TOXOID 2. Medicare annual wellness visit, subsequent 3. Screening for alcoholism -     Annual  Alcohol Screen 15 min () 4. Screening for depression -     Depression Screen Annual 
 
5. Screen for colon cancer 6. Encounter for screening mammogram for malignant neoplasm of breast 
-     Bilateral Digital Screening Mammography; Future 7. Disorder of bone and cartilage Other orders -     Dexa Bone Density Study Axial ([id number]); Future Health Maintenance Due Topic Date Due  Shingrix Vaccine Age 50> (1 of 2) 08/20/2003  BREAST CANCER SCRN MAMMOGRAM  08/31/2013  DTaP/Tdap/Td series (1 - Tdap) 07/20/2016  MEDICARE YEARLY EXAM  03/14/2018  Influenza Age 5 to Adult  08/01/2018  GLAUCOMA SCREENING Q2Y  08/20/2018  Bone Densitometry (Dexa) Screening  08/20/2018  Pneumococcal 65+ Low/Medium Risk (1 of 2 - PCV13) 08/20/2018  FOBT Q 1 YEAR AGE 50-75  11/30/2018

## 2018-10-10 NOTE — ACP (ADVANCE CARE PLANNING)
Advance Care Planning (ACP) Provider Conversation Snapshot    Date of ACP Conversation: 10/10/18  Persons included in Conversation:  patient  Length of ACP Conversation in minutes:  <16 minutes (Non-Billable)    Authorized Decision Maker (if patient is incapable of making informed decisions): This person is:   Healthcare Agent/Medical Power of  under Advance Directive          For Patients with Decision Making Capacity:   Values/Goals: Exploration of values, goals, and preferences if recovery is not expected, even with continued medical treatment in the event of:  Imminent death  Severe, permanent brain injury  \"In these circumstances, what matters most to you? \"  Care focused more on comfort or quality of life.     Conversation Outcomes / Follow-Up Plan:   Recommended completion of Advance Directive form after review of ACP materials and conversation with prospective healthcare agent

## 2018-10-11 LAB
BACTERIA UR CULT: NORMAL
CHOLEST SERPL-MCNC: 247 MG/DL (ref 100–199)
GLUCOSE SERPL-MCNC: 82 MG/DL (ref 65–99)
HDLC SERPL-MCNC: 53 MG/DL
LDLC SERPL CALC-MCNC: 153 MG/DL (ref 0–99)
SPECIMEN STATUS REPORT, ROLRST: NORMAL
TRIGL SERPL-MCNC: 203 MG/DL (ref 0–149)
VLDLC SERPL CALC-MCNC: 41 MG/DL (ref 5–40)

## 2018-10-19 LAB — HEMOCCULT STL QL IA: NEGATIVE

## 2018-10-19 RX ORDER — SERTRALINE HYDROCHLORIDE 50 MG/1
TABLET, FILM COATED ORAL
Qty: 30 TAB | Refills: 1 | Status: SHIPPED | OUTPATIENT
Start: 2018-10-19 | End: 2018-12-19 | Stop reason: SDUPTHER

## 2018-10-24 ENCOUNTER — TELEPHONE (OUTPATIENT)
Dept: INTERNAL MEDICINE CLINIC | Age: 63
End: 2018-10-24

## 2018-10-24 NOTE — TELEPHONE ENCOUNTER
Aditya Mario called from [facility] and stated that the pt needs a new hospital bed and she asked that Dr. Tamra Meyer write a prescription for a hospital bed with and incontinence cover and a tub transfer bench. The prescription can be faxed to [facility] at [phone].     Paola's call back number for any questions is: [phone]

## 2018-10-24 NOTE — TELEPHONE ENCOUNTER
I called almaz back after talking with Dr. Andree Bergeron and notified her that neurology would need to write script for new bed.

## 2018-11-28 RX ORDER — TIZANIDINE 4 MG/1
TABLET ORAL
Qty: 60 TAB | Refills: 0 | Status: SHIPPED | OUTPATIENT
Start: 2018-11-28 | End: 2019-01-13 | Stop reason: SDUPTHER

## 2018-12-19 RX ORDER — SERTRALINE HYDROCHLORIDE 50 MG/1
TABLET, FILM COATED ORAL
Qty: 30 TAB | Refills: 0 | Status: SHIPPED | OUTPATIENT
Start: 2018-12-19 | End: 2019-01-17 | Stop reason: SDUPTHER

## 2019-01-14 RX ORDER — TIZANIDINE 4 MG/1
TABLET ORAL
Qty: 60 TAB | Refills: 0 | Status: SHIPPED | OUTPATIENT
Start: 2019-01-14 | End: 2019-02-25 | Stop reason: SDUPTHER

## 2019-01-17 RX ORDER — SERTRALINE HYDROCHLORIDE 50 MG/1
TABLET, FILM COATED ORAL
Qty: 30 TAB | Refills: 0 | Status: SHIPPED | OUTPATIENT
Start: 2019-01-17 | End: 2019-02-15 | Stop reason: SDUPTHER

## 2019-01-18 ENCOUNTER — HOSPITAL ENCOUNTER (EMERGENCY)
Age: 64
Discharge: HOME | End: 2019-01-18
Attending: EMERGENCY MEDICINE | Admitting: EMERGENCY MEDICINE
Payer: COMMERCIAL

## 2019-01-18 VITALS
TEMPERATURE: 98.5 F | HEIGHT: 63 IN | DIASTOLIC BLOOD PRESSURE: 95 MMHG | HEART RATE: 86 BPM | WEIGHT: 179.45 LBS | OXYGEN SATURATION: 99 % | BODY MASS INDEX: 31.8 KG/M2 | SYSTOLIC BLOOD PRESSURE: 115 MMHG | RESPIRATION RATE: 15 BRPM

## 2019-01-18 DIAGNOSIS — G35 MULTIPLE SCLEROSIS (HCC): ICD-10-CM

## 2019-01-18 DIAGNOSIS — R35.0 URINARY FREQUENCY: Primary | ICD-10-CM

## 2019-01-18 LAB
ALBUMIN SERPL-MCNC: 3.6 G/DL (ref 3.5–5)
ALBUMIN/GLOB SERPL: 0.9 {RATIO} (ref 1.1–2.2)
ALP SERPL-CCNC: 60 U/L (ref 45–117)
ALT SERPL-CCNC: 19 U/L (ref 12–78)
ANION GAP SERPL CALC-SCNC: 7 MMOL/L (ref 5–15)
APPEARANCE UR: ABNORMAL
AST SERPL-CCNC: 12 U/L (ref 15–37)
BACTERIA URNS QL MICRO: ABNORMAL /HPF
BASOPHILS # BLD: 0 K/UL (ref 0–0.1)
BASOPHILS NFR BLD: 0 % (ref 0–1)
BILIRUB SERPL-MCNC: 0.3 MG/DL (ref 0.2–1)
BILIRUB UR QL: NEGATIVE
BUN SERPL-MCNC: 11 MG/DL (ref 6–20)
BUN/CREAT SERPL: 17 (ref 12–20)
CALCIUM SERPL-MCNC: 9 MG/DL (ref 8.5–10.1)
CHLORIDE SERPL-SCNC: 107 MMOL/L (ref 97–108)
CO2 SERPL-SCNC: 27 MMOL/L (ref 21–32)
COLOR UR: ABNORMAL
COMMENT, HOLDF: NORMAL
CREAT SERPL-MCNC: 0.65 MG/DL (ref 0.55–1.02)
DIFFERENTIAL METHOD BLD: NORMAL
EOSINOPHIL # BLD: 0 K/UL (ref 0–0.4)
EOSINOPHIL NFR BLD: 1 % (ref 0–7)
EPITH CASTS URNS QL MICRO: ABNORMAL /LPF
ERYTHROCYTE [DISTWIDTH] IN BLOOD BY AUTOMATED COUNT: 13.5 % (ref 11.5–14.5)
GLOBULIN SER CALC-MCNC: 4.2 G/DL (ref 2–4)
GLUCOSE SERPL-MCNC: 112 MG/DL (ref 65–100)
GLUCOSE UR STRIP.AUTO-MCNC: NEGATIVE MG/DL
HCT VFR BLD AUTO: 43.9 % (ref 35–47)
HGB BLD-MCNC: 13.8 G/DL (ref 11.5–16)
HGB UR QL STRIP: NEGATIVE
HYALINE CASTS URNS QL MICRO: ABNORMAL /LPF (ref 0–5)
IMM GRANULOCYTES # BLD AUTO: 0 K/UL (ref 0–0.04)
IMM GRANULOCYTES NFR BLD AUTO: 0 % (ref 0–0.5)
KETONES UR QL STRIP.AUTO: ABNORMAL MG/DL
LEUKOCYTE ESTERASE UR QL STRIP.AUTO: ABNORMAL
LYMPHOCYTES # BLD: 1.8 K/UL (ref 0.8–3.5)
LYMPHOCYTES NFR BLD: 23 % (ref 12–49)
MCH RBC QN AUTO: 26.8 PG (ref 26–34)
MCHC RBC AUTO-ENTMCNC: 31.4 G/DL (ref 30–36.5)
MCV RBC AUTO: 85.4 FL (ref 80–99)
MONOCYTES # BLD: 0.6 K/UL (ref 0–1)
MONOCYTES NFR BLD: 8 % (ref 5–13)
NEUTS SEG # BLD: 5.3 K/UL (ref 1.8–8)
NEUTS SEG NFR BLD: 68 % (ref 32–75)
NITRITE UR QL STRIP.AUTO: NEGATIVE
NRBC # BLD: 0 K/UL (ref 0–0.01)
NRBC BLD-RTO: 0 PER 100 WBC
PH UR STRIP: 6.5 [PH] (ref 5–8)
PLATELET # BLD AUTO: 318 K/UL (ref 150–400)
PMV BLD AUTO: 10.3 FL (ref 8.9–12.9)
POTASSIUM SERPL-SCNC: 3.6 MMOL/L (ref 3.5–5.1)
PROT SERPL-MCNC: 7.8 G/DL (ref 6.4–8.2)
PROT UR STRIP-MCNC: NEGATIVE MG/DL
RBC # BLD AUTO: 5.14 M/UL (ref 3.8–5.2)
RBC #/AREA URNS HPF: ABNORMAL /HPF (ref 0–5)
SAMPLES BEING HELD,HOLD: NORMAL
SODIUM SERPL-SCNC: 141 MMOL/L (ref 136–145)
SP GR UR REFRACTOMETRY: 1.02 (ref 1–1.03)
UR CULT HOLD, URHOLD: NORMAL
UROBILINOGEN UR QL STRIP.AUTO: 1 EU/DL (ref 0.2–1)
WBC # BLD AUTO: 7.8 K/UL (ref 3.6–11)
WBC URNS QL MICRO: ABNORMAL /HPF (ref 0–4)

## 2019-01-18 PROCEDURE — 36415 COLL VENOUS BLD VENIPUNCTURE: CPT

## 2019-01-18 PROCEDURE — 81001 URINALYSIS AUTO W/SCOPE: CPT

## 2019-01-18 PROCEDURE — 99285 EMERGENCY DEPT VISIT HI MDM: CPT

## 2019-01-18 PROCEDURE — 80053 COMPREHEN METABOLIC PANEL: CPT

## 2019-01-18 PROCEDURE — 85025 COMPLETE CBC W/AUTO DIFF WBC: CPT

## 2019-01-18 NOTE — ED NOTES
1857: Received update from [address] [place] [address] on transportation being set up.  
 
1940: [facility] at the bedside.

## 2019-01-18 NOTE — DISCHARGE INSTRUCTIONS
Patient Education        Multiple Sclerosis (MS): Care Instructions  Your Care Instructions  Multiple sclerosis, also called MS, is a disease that can affect the brain, spinal cord, and nerves to the eyes. MS can cause problems with muscle control and strength, vision, balance, feeling, and thinking. Whatever your symptoms are, taking medicine correctly and following your doctor's advice for home care can help you maintain your quality of life. Follow-up care is a key part of your treatment and safety. Be sure to make and go to all appointments, and call your doctor if you are having problems. It's also a good idea to know your test results and keep a list of the medicines you take. How can you care for yourself at home? General care  · Take your medicines exactly as prescribed. Call your doctor if you think you are having a problem with your medicine. · Use a cane, walker, or scooter if your doctor suggests it. · Keep doing your normal activities as much as you can. · If you have problems urinating, press or tap your bladder area to help start urine flow. If you have trouble controlling your urine, plan your fluid intake and activities so that a toilet will be available when you need it. · Spend time with family and friends. Join a support group for people with MS if you want extra help. · Depression is common with this condition. Tell your doctor if you have trouble sleeping, are eating too much or are not hungry, or feel sad or tearful all the time. Depression can be treated with medicine and counseling. Diet and exercise  · Eat a balanced diet. · If you have problems swallowing, change how and what you eat:  ? Try thick drinks, such as milk shakes. They are easier to swallow than other fluids. ? Do not eat foods that crumble easily. These can cause choking. ? Use a  to prepare food. Soft foods need less chewing.   ? Eat small meals often so that you do not get tired from eating larger meals.  · Get exercise on most days. Work with your doctor to set up a program of walking, swimming, or other exercise that you are able to do. A physical therapist can teach you exercises if you cannot walk but can move your limbs and trunk. Or you can do exercises to help with coordination and balance. You can help improve muscle stiffness by doing exercises while lying in certain positions. When should you call for help? Call your doctor now or seek immediate medical care if:    · You have a change in symptoms.     · You fall or have another injury.     · You have symptoms of a urinary infection. For example:  ? You have blood or pus in your urine. ? You have pain in your back just below your rib cage. This is called flank pain. ? You have a fever, chills, or body aches. ? It hurts to urinate. ? You have groin or belly pain.    Watch closely for changes in your health, and be sure to contact your doctor if:    · You want more information about MS or medicines.     · You have questions about alternative treatments. Do not use any other treatments without talking to your doctor first.   Where can you learn more? Go to http://sudheer-chemo.info/. Enter B038 in the search box to learn more about \"Multiple Sclerosis (MS): Care Instructions. \"  Current as of: Desire 3, 2018  Content Version: 11.9  © 4791-5483 [employer]wise, [employer]orporated. Care instructions adapted under license by [employer] (which disclaims liability or warranty for this information). If you have questions about a medical condition or this instruction, always ask your healthcare professional. [place] [address] any warranty or liability for your use of this information.

## 2019-01-18 NOTE — ED NOTES
Patient incontinent of urine. Pericare given and linens changed. Clair Ontiveros in place to help with incontinence and urinalysis collection. Patient repositioned for comfort.

## 2019-01-18 NOTE — ED PROVIDER NOTES
HPI  
 
  68y F with hx of MS here with urinary frequency x 2 months. Feels some slight pressure in the suprapubic region but no dysuria or hematuria. Feels like she's been more thirsty lately as well but can't really remember for how long. No fever. No nausea or vomiting. No hx of similar. No rash. No chest pain. No trouble breathing. Past Medical History:  
Diagnosis Date  Multiple sclerosis ([facility] [address]  Neurological disorder Multiple sclerosis, in remission 2011  Other ill-defined conditions(799.89) Mitral Value Prolapse Past Surgical History:  
Procedure Laterality Date  ABDOMEN SURGERY PROC UNLISTED    
 hernia repair  HX HYSTERECTOMY History reviewed. No pertinent family history. Social History Socioeconomic History  Marital status: SINGLE Spouse name: Not on file  Number of children: Not on file  Years of education: Not on file  Highest education level: Not on file Social Needs  Financial resource strain: Not on file  Food insecurity - worry: Not on file  Food insecurity - inability: Not on file  Transportation needs - medical: Not on file  Transportation needs - non-medical: Not on file Occupational History  Not on file Tobacco Use  Smoking status: Former Smoker  Smokeless tobacco: Never Used Substance and Sexual Activity  Alcohol use: Yes Comment: social  
 Drug use: Yes Types: Marijuana Comment: occasional marijuana  Sexual activity: Not Currently Other Topics Concern  Not on file Social History Narrative  Not on file ALLERGIES: Patient has no known allergies. Review of Systems Review of Systems Constitutional: (-) weight loss. HEENT: (-) stiff neck Eyes: (-) discharge. Respiratory: (-) cough. Cardiovascular: (-) syncope. Gastrointestinal: (-) blood in stool. Genitourinary: (-) hematuria. Musculoskeletal: (-) myalgias. Neurological: (-) seizure. Skin: (-) petechiae Lymph/Immunologic: (-) enlarged lymph nodes All other systems reviewed and are negative. Vitals:  
 01/18/19 1512 BP: 112/66 Pulse: 78 Resp: 16 Temp: 98.3 °F (36.8 °C) SpO2: 100% Weight: 81.4 kg (179 lb 7.3 oz) Height: 5' 3\" (1.6 m) Physical Exam Nursing note and vitals reviewed. Constitutional: oriented to person, place, and time. appears elderly and frail. No distress. Head: Normocephalic and atraumatic. Sclera anicteric Nose: No rhinorrhea Mouth/Throat: Oropharynx is clear and moist. Pharynx normal 
Eyes: Conjunctivae are normal. Pupils are equal, round, and reactive to light. Right eye exhibits no discharge. Left eye exhibits no discharge. Neck: Painless normal range of motion. Neck supple. No LAD. Cardiovascular: Normal rate, regular rhythm, normal heart sounds and intact distal pulses. Exam reveals no gallop and no friction rub. No murmur heard. Pulmonary/Chest:  No respiratory distress. No wheezes. No rales. No rhonchi. No increased work of breathing. No accessory muscle use. Good air exchange throughout. Abdominal: soft, non-tender, no rebound or guarding. No hepatosplenomegaly. Normal bowel sounds throughout. Back: no tenderness to palpation, no deformities, no CVA tenderness Extremities/Musculoskeletal: Normal range of motion. no tenderness. No edema. Distal extremities are neurovasc intact. Lymphadenopathy:   No adenopathy. Neurological:  Alert and oriented to person, place, and time. CN 2-12 intact. Sensory function intact. Limited movement of RLE (baseline). Skin: Skin is warm and dry. No rash noted. No pallor. [place] [id number] LUZ YAP here with urinary frequency x 2 months. Will check labs and urine. Procedures 5:42 PM 
Labs ok. Could be related to her MS. Is going to be seeing neurology here. Asked her to call tomorrow to see about getting an appt sooner. Return precautions discussed.

## 2019-01-18 NOTE — ED TRIAGE NOTES
Patient arrives vis EMS from home for frequent urination for 2 months. Patient denies abdominal pain. Denies pain with urination. Patient reports urinating every 15 minutes.
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
## 2019-01-19 NOTE — ED NOTES
Patient verbalizes understanding of discharge instructions given by provider. Opportunity for questions provided. Patient in no apparent distress. Patient taken to facility via [facility] upon discharge. Visit Vitals BP (!) 115/95 (BP 1 Location: Right arm, BP Patient Position: At rest;Sitting) Pulse 86 Temp 98.5 °F (36.9 °C) Resp 15 Ht 5' 3\" (1.6 m) Wt 81.4 kg (179 lb 7.3 oz) SpO2 99% BMI 31.79 kg/m²

## 2019-02-04 ENCOUNTER — TELEPHONE (OUTPATIENT)
Dept: NEUROLOGY | Age: 64
End: 2019-02-04

## 2019-02-04 ENCOUNTER — OFFICE VISIT (OUTPATIENT)
Dept: NEUROLOGY | Age: 64
End: 2019-02-04

## 2019-02-04 VITALS
HEART RATE: 89 BPM | RESPIRATION RATE: 18 BRPM | DIASTOLIC BLOOD PRESSURE: 76 MMHG | WEIGHT: 165 LBS | BODY MASS INDEX: 29.23 KG/M2 | HEIGHT: 63 IN | OXYGEN SATURATION: 98 % | SYSTOLIC BLOOD PRESSURE: 122 MMHG

## 2019-02-04 DIAGNOSIS — R32 URINARY INCONTINENCE, UNSPECIFIED TYPE: ICD-10-CM

## 2019-02-04 DIAGNOSIS — G35 MULTIPLE SCLEROSIS EXACERBATION (HCC): Primary | ICD-10-CM

## 2019-02-04 RX ORDER — OXYBUTYNIN CHLORIDE 5 MG/1
5 TABLET ORAL 3 TIMES DAILY
Qty: 90 TAB | Refills: 1 | Status: ON HOLD | OUTPATIENT
Start: 2019-02-04 | End: 2021-03-26
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
## 2019-02-04 NOTE — PROGRESS NOTES
Pt here for follow up. Cannot control her bladder, cannot use her R leg. She feels her weakness has increased. She does not feel like she is getting enough air. Had a reaction to the Rebif. (rash and trouble breathing.) Depression screen completed.

## 2019-02-04 NOTE — TELEPHONE ENCOUNTER
Okay.  I will take a look at her if she comes to her appointment. I have not seen her since 2016 and based on that visit she seemed fine. This sounds like a broad-spectrum condition and she is probably going to have to engage with primary care for a lot of these needs. One cannot say this is exclusively an MS process. She also has heart disease.  needs to encourage her to see primary care as well.

## 2019-02-05 ENCOUNTER — TELEPHONE (OUTPATIENT)
Dept: NEUROLOGY | Age: 64
End: 2019-02-05

## 2019-02-05 NOTE — TELEPHONE ENCOUNTER
----- Message from Ann Gonzales sent at 2/5/2019  2:13 PM EST -----  Regarding: Dr. Kaylene Herrera., from [facility], requested a call back from Ysabel with results/details regarding most recent visit. Best contact [phone].

## 2019-02-06 NOTE — TELEPHONE ENCOUNTER
I spoke with [facility] and advised we will need a release of information from pt/[employer] to share information.

## 2019-02-07 LAB
ALBUMIN SERPL-MCNC: 4.4 G/DL (ref 3.6–4.8)
ALBUMIN/GLOB SERPL: 1.4 {RATIO} (ref 1.2–2.2)
ALP SERPL-CCNC: 64 IU/L (ref 39–117)
ALT SERPL-CCNC: 13 IU/L (ref 0–32)
AST SERPL-CCNC: 15 IU/L (ref 0–40)
BILIRUB SERPL-MCNC: 0.2 MG/DL (ref 0–1.2)
BUN SERPL-MCNC: 11 MG/DL (ref 8–27)
BUN/CREAT SERPL: 21 (ref 12–28)
CALCIUM SERPL-MCNC: 10 MG/DL (ref 8.7–10.3)
CHLORIDE SERPL-SCNC: 104 MMOL/L (ref 96–106)
CO2 SERPL-SCNC: 24 MMOL/L (ref 20–29)
CREAT SERPL-MCNC: 0.52 MG/DL (ref 0.57–1)
ERYTHROCYTE [DISTWIDTH] IN BLOOD BY AUTOMATED COUNT: 13.8 % (ref 12.3–15.4)
GLOBULIN SER CALC-MCNC: 3.2 G/DL (ref 1.5–4.5)
GLUCOSE SERPL-MCNC: 79 MG/DL (ref 65–99)
HCT VFR BLD AUTO: 41.6 % (ref 34–46.6)
HGB BLD-MCNC: 13.5 G/DL (ref 11.1–15.9)
JCPYV DNA SPEC QL NAA+PROBE: NEGATIVE
MCH RBC QN AUTO: 27.3 PG (ref 26.6–33)
MCHC RBC AUTO-ENTMCNC: 32.5 G/DL (ref 31.5–35.7)
MCV RBC AUTO: 84 FL (ref 79–97)
PLATELET # BLD AUTO: 349 X10E3/UL (ref 150–379)
POTASSIUM SERPL-SCNC: 4.2 MMOL/L (ref 3.5–5.2)
PROT SERPL-MCNC: 7.6 G/DL (ref 6–8.5)
RBC # BLD AUTO: 4.94 X10E6/UL (ref 3.77–5.28)
SODIUM SERPL-SCNC: 145 MMOL/L (ref 134–144)
WBC # BLD AUTO: 7.8 X10E3/UL (ref 3.4–10.8)

## 2019-02-08 ENCOUNTER — TELEPHONE (OUTPATIENT)
Dept: NEUROLOGY | Age: 64
End: 2019-02-08

## 2019-02-08 NOTE — TELEPHONE ENCOUNTER
LM on VM that the fax I sent to them included one for PT and one for OT as well as physical med rehab.

## 2019-02-08 NOTE — TELEPHONE ENCOUNTER
----- Message from Jerry Welch sent at 2/8/2019 10:55 AM EST -----  Regarding: Dr. Andreas Nuñez ([facility]) requesting a New order for the pt. for a PT and OT due to insurance company requiring a consult to be done first before the pt can be approved for in pt. Rehab stay.  Best contact [phone]

## 2019-02-14 ENCOUNTER — TELEPHONE (OUTPATIENT)
Dept: NEUROLOGY | Age: 64
End: 2019-02-14

## 2019-02-14 NOTE — TELEPHONE ENCOUNTER
----- Message from Kaylyn Dance sent at 2/13/2019  4:32 PM EST -----  Regarding: Dr. Storm Benitez  Contact: [phone]  Paola ([address] [employer]) would like to let  know that pt will be seen for all 3 OT visits to assist pt with equipment.

## 2019-02-15 RX ORDER — SERTRALINE HYDROCHLORIDE 50 MG/1
TABLET, FILM COATED ORAL
Qty: 30 TAB | Refills: 0 | Status: SHIPPED | OUTPATIENT
Start: 2019-02-15 | End: 2019-03-20 | Stop reason: SDUPTHER

## 2019-02-21 ENCOUNTER — TELEPHONE (OUTPATIENT)
Dept: NEUROLOGY | Age: 64
End: 2019-02-21

## 2019-02-21 DIAGNOSIS — G35 MULTIPLE SCLEROSIS (HCC): Primary | ICD-10-CM

## 2019-02-21 NOTE — TELEPHONE ENCOUNTER
John Fowler from [address] archibald to see if Dr. Barbara Hayward is willing to write order for patient to have a home health aid 2-3 times a week for 4 weeks. Please advise.       Best # John Fowler (patient's ) [phone]

## 2019-02-22 NOTE — TELEPHONE ENCOUNTER
has talked with PT Liliana Ybarra) and she will reach out to him on Monday to ask if he agrees with home health and if he does, to please put it in his report that he sends to us so Doctor can sign off on it. CM also states she has been leaving messages for Deondre Griffin with [facility] for a week and can not get a call back.

## 2019-02-22 NOTE — TELEPHONE ENCOUNTER
When I saw the patient on February 4 I ordered physical therapy and occupational therapy. I also ordered an evaluation for inpatient rehabilitation. She needs to move forward with those recommendations and if PT OT think she needs home health they can certainly order it or send it to me and have me sign it.  should reach out to therapy that has already been consulted.

## 2019-02-25 ENCOUNTER — TELEPHONE (OUTPATIENT)
Dept: NEUROLOGY | Age: 64
End: 2019-02-25

## 2019-02-25 RX ORDER — TIZANIDINE 4 MG/1
TABLET ORAL
Qty: 60 TAB | Refills: 0 | Status: SHIPPED | OUTPATIENT
Start: 2019-02-25 | End: 2019-04-05 | Stop reason: SDUPTHER

## 2019-02-25 NOTE — TELEPHONE ENCOUNTER
[place]ia calling bout pt's insurance not covering it for the rehab. Peer to peer is needed.  Please advise

## 2019-02-25 NOTE — TELEPHONE ENCOUNTER
True Darryl calling again with an option of a peer to peer, they said they can do the peer to peer tomorrow

## 2019-02-25 NOTE — TELEPHONE ENCOUNTER
I will place a new order for home health. Matheus has denied inpatient rehab before when I have attempted to do the same with her before.

## 2019-02-25 NOTE — TELEPHONE ENCOUNTER
Since his [employer] they will not cover inpatient. She is better off with home health. Continue home health.

## 2019-02-25 NOTE — TELEPHONE ENCOUNTER
[place] calling bout pt's insurance not covering it for the rehab. Peer to peer is needed.  Please advise         Chalo Hope   [phone]   Att: [phone]  Policy: [id number]

## 2019-02-26 NOTE — TELEPHONE ENCOUNTER
Caroline Chan from [facility] returning Maggi's call in regards to peer to peer. Please advise.      Best # [phone]

## 2019-02-27 ENCOUNTER — OFFICE VISIT (OUTPATIENT)
Dept: INTERNAL MEDICINE CLINIC | Age: 64
End: 2019-02-27

## 2019-02-27 VITALS
DIASTOLIC BLOOD PRESSURE: 77 MMHG | SYSTOLIC BLOOD PRESSURE: 123 MMHG | WEIGHT: 162.4 LBS | RESPIRATION RATE: 18 BRPM | TEMPERATURE: 97.4 F | HEIGHT: 63 IN | BODY MASS INDEX: 28.77 KG/M2 | HEART RATE: 62 BPM | OXYGEN SATURATION: 94 %

## 2019-02-27 DIAGNOSIS — H25.9 SENILE CATARACT OF LEFT EYE, UNSPECIFIED AGE-RELATED CATARACT TYPE: Primary | ICD-10-CM

## 2019-02-27 DIAGNOSIS — Z01.811 PRE-OP CHEST EXAM: ICD-10-CM

## 2019-02-27 NOTE — TELEPHONE ENCOUNTER
Devika Chavez from [facility] calling in regards to patient peer to peer per previous messages about patient receiving home health. Please advise.     Best # Devika Chavez [facility]) --> [phone] ext [phone]

## 2019-02-27 NOTE — PATIENT INSTRUCTIONS
Cataract Surgery: Before Your Surgery What is cataract surgery? Cataracts are cloudy areas in the lens of your eye. Your lens is behind the colored part of your eye (iris). Its job is to focus light onto the back of your eye. In some people, cataracts prevent light from reaching the back of the eye. This can cause vision problems. Cataract surgery helps you see better. It replaces your natural lens, which has become cloudy, with a clear artificial one. There are two types of cataract surgery. Phacoemulsification (say \"krdh-wk-vl-bck-xum-lhq-MICHELLE-shun\") is the most common type. The doctor makes a small cut in your eye. This cut is called an incision. The doctor uses a special ultrasound tool to break your cloudy lens apart. Sometimes a laser is used too. Then he or she removes the small pieces of the lens through the incision. In most cases, the doctor then inserts an artificial lens through the incision. Most people do not need stitches, because the incision is so small. If the doctor is not able to put in an artificial lens, you can wear a contact lens or thick glasses in place of your natural lens. Extracapsular extraction is a less common type of cataract surgery. The doctor makes a larger incision to remove the whole lens at once. After the doctor removes the lens, he or she stitches up the incision. Recovery from this type of surgery takes longer. Before either surgery, the doctor puts numbing drops in your eye. Some doctors use a shot instead. You may also get medicine to make you feel relaxed. You probably will not feel much pain. The surgery takes about 20 to 40 minutes. After surgery, you may have a bandage or shield on your eye. You will probably go home from surgery after 1 hour in the recovery room. Most people see better in 1 to 3 days. You may be able to go back to work or your normal routine in a few days.  It could take 3 to 10 weeks for your eye to completely heal. After your eye heals, you may still need to wear glasses, especially for reading. Follow-up care is a key part of your treatment and safety. Be sure to make and go to all appointments, and call your doctor if you are having problems. It's also a good idea to know your test results and keep a list of the medicines you take. What happens before surgery? 
 Surgery can be stressful. This information will help you understand what you can expect. And it will help you safely prepare for surgery. 
 Preparing for surgery 
  · Understand exactly what surgery is planned, along with the risks, benefits, and other options. · Tell your doctors ALL the medicines, vitamins, supplements, and herbal remedies you take. Some of these can increase the risk of bleeding or interact with anesthesia.  
  · If you take blood thinners, such as warfarin (Coumadin), clopidogrel (Plavix), or aspirin, be sure to talk to your doctor. He or she will tell you if you should stop taking these medicines before your surgery. Make sure that you understand exactly what your doctor wants you to do.  
  · Your doctor will tell you which medicines to take or stop before your surgery. You may need to stop taking certain medicines a week or more before surgery. So talk to your doctor as soon as you can.  
  · If you have an advance directive, let your doctor know. It may include a living will and a durable power of  for health care. Bring a copy to the hospital. If you don't have one, you may want to prepare one. It lets your doctor and loved ones know your health care wishes. Doctors advise that everyone prepare these papers before any type of surgery or procedure. What happens on the day of surgery? · Follow the instructions exactly about when to stop eating and drinking. If you don't, your surgery may be canceled.  If your doctor told you to take your medicines on the day of surgery, take them with only a sip of water.  
  · Take a bath or shower before you come in for your surgery. Do not apply lotions, perfumes, deodorants, or nail polish.  
  · Take off all jewelry and piercings. And take out contact lenses, if you wear them.  
 At the hospital or surgery center · Bring a picture ID.  
  · The area for surgery is often marked to make sure there are no errors.  
  · You will be kept comfortable and safe by your anesthesia provider. The anesthesia may make you sleep. Or it may just numb the area being worked on.  
  · The surgery will take about 20 to 40 minutes. Going home · Be sure you have someone to drive you home. Anesthesia and pain medicine make it unsafe for you to drive.  
  · You will be given more specific instructions about recovering from your surgery. They will cover things like diet, wound care, follow-up care, driving, and getting back to your normal routine.  
  · You may have a bandage or patch over your eye. You may also have a clear shield over your eye. This prevents you from rubbing it. When should you call your doctor? · You have questions or concerns.  
  · You don't understand how to prepare for your surgery.  
  · You become ill before the surgery (such as fever, flu, or a cold).  
  · You need to reschedule or have changed your mind about having the surgery. Where can you learn more? Go to http://sudheer-chemo.info/. Enter K474 in the search box to learn more about \"Cataract Surgery: Before Your Surgery. \" Current as of: July 17, 2018 Content Version: 11.9 © 7670-9498 [employer]. Care instructions adapted under license by [employer] (which disclaims liability or warranty for this information). If you have questions about a medical condition or this instruction, always ask your healthcare professional. [address] any warranty or liability for your use of this information.

## 2019-02-27 NOTE — PROGRESS NOTES
Chief Complaint Patient presents with  Pre-op Exam  
 
she is a 70y.o. year old female who presents for evaluation of pre op exam 
Preoperative Evaluation Date of Exam: 2/27/2019 Annabel Ormond is a 70 y.o. female who presents for preoperative evaluation. Procedure/Surgery: Cataract Surgery Date of Procedure/Surgery: Tuesday 3/5/19 Surgeon: Dr. Jose Cummins Hospital/Surgical Facility: [facility] Primary Physician: Fabby Caldwell Latex Allergy: no 
 
Problem List:    
Patient Active Problem List  
 Diagnosis Date Noted  Closed fracture of proximal end of left fibula 08/20/2018  Inability to ambulate due to left ankle or foot 08/20/2018  Multiple sclerosis exacerbation ([facility] [address] 02/24/2017 Medical History:    
Past Medical History:  
Diagnosis Date  Multiple sclerosis ([facility] [address]  Neurological disorder Multiple sclerosis, in remission 2011  Other ill-defined conditions(799.89) Mitral Value Prolapse Allergies:   No Known Allergies Medications:    
Current Outpatient Medications Medication Sig  tiZANidine (ZANAFLEX) 4 mg tablet TAKE ONE TABLET BY MOUTH EVERY 8 HOURS AS NEEDED  sertraline (ZOLOFT) 50 mg tablet TAKE ONE TABLET BY MOUTH DAILY  oxybutynin (DITROPAN) 5 mg tablet Take 1 Tab by mouth three (3) times daily.  bisacodyl (DULCOLAX) 10 mg suppository Insert 10 mg into rectum daily as needed. No current facility-administered medications for this visit. Surgical History:    
Past Surgical History:  
Procedure Laterality Date  ABDOMEN SURGERY PROC UNLISTED    
 hernia repair  HX HYSTERECTOMY Social History:    
Social History Socioeconomic History  Marital status: SINGLE Spouse name: Not on file  Number of children: Not on file  Years of education: Not on file  Highest education level: Not on file Tobacco Use  Smoking status: Former Smoker  Smokeless tobacco: Never Used Substance and Sexual Activity  Alcohol use: Yes Comment: social  
 Drug use: Yes Types: Marijuana Comment: occasional marijuana  Sexual activity: Not Currently Recent use of: No recent use of aspirin (ASA), NSAIDS or steroids Tetanus up to date: last tetanus booster within 10 years Anesthesia Complications: None History of abnormal bleeding : None History of Blood Transfusions: no 
Health Care Directive or Living Will: no 
 
 
Past Surgical History:  
Procedure Laterality Date  ABDOMEN SURGERY PROC UNLISTED    
 hernia repair  HX HYSTERECTOMY History reviewed. No pertinent family history. Past Medical History:  
Diagnosis Date  Multiple sclerosis ([facility] [address]  Neurological disorder Multiple sclerosis, in remission 2011  Other ill-defined conditions(799.89) Mitral Value Prolapse Social History Socioeconomic History  Marital status: SINGLE Spouse name: Not on file  Number of children: Not on file  Years of education: Not on file  Highest education level: Not on file Tobacco Use  Smoking status: Former Smoker  Smokeless tobacco: Never Used Substance and Sexual Activity  Alcohol use: Yes Comment: social  
 Drug use: Yes Types: Marijuana Comment: occasional marijuana  Sexual activity: Not Currently Reviewed and agree with Nurse Note and duplicated in this note. Reviewed PmHx, RxHx, FmHx, SocHx, AllgHx and updated and dated in the chart. Review of Systems - negative except as listed above Objective:  
 
Vitals:  
 02/27/19 2624 Pulse: 62 Resp: 18 Temp: 97.4 °F (36.3 °C) TempSrc: Oral  
SpO2: 94% Weight: 162 lb 6.4 oz (73.7 kg) Height: 5' 3\" (1.6 m) Physical Examination: General appearance - alert, well appearing, and in no distress Eyes - pupils equal and reactive, extraocular eye movements intact Ears - bilateral TM's and external ear canals normal 
 Nose - normal and patent, no erythema, discharge or polyps Mouth - mucous membranes moist, pharynx normal without lesions Neck - supple, no significant adenopathy Chest - clear to auscultation, no wheezes, rales or rhonchi, symmetric air entry Heart - normal rate, regular rhythm, normal S1, S2, no murmurs, rubs, clicks or gallops Abdomen - soft, nontender, nondistended, no masses or organomegaly Extremities - peripheral pulses normal, no pedal edema, no clubbing or cyanosis Skin - normal coloration and turgor, no rashes, no suspicious skin lesions noted Assessment/ Plan:  
Diagnoses and all orders for this visit: 1. Senile cataract of left eye, unspecified age-related cataract type 2. Pre-op chest exam 
 
3. BMI 28.0-28.9,adult Preop form filled for patient Follow-up Disposition: Not on File I have reviewed/discussed the above normal BMI with the patient. I have recommended the following interventions: dietary management education, guidance, and counseling . I have discussed the diagnosis with the patient and the intended plan as seen in the above orders. The patient has received an after-visit summary and questions were answered concerning future plans. Medication Side Effects and Warnings were discussed with patient, Patient Labs were reviewed and or requested, and 
Patient Past Records were reviewed and or requested  yes Pt agrees to call or return to clinic and/or go to closest ER with any worsening of symptoms. This may include, but not limited to increased fever (>100.4) with NSAIDS or Tylenol, increased edema, confusion, rash, worsening of presenting symptoms.

## 2019-03-20 ENCOUNTER — HOSPITAL ENCOUNTER (OUTPATIENT)
Dept: MRI IMAGING | Age: 64
Discharge: HOME | End: 2019-03-20
Attending: PSYCHIATRY & NEUROLOGY
Payer: MEDICARE

## 2019-03-20 VITALS — BODY MASS INDEX: 29.23 KG/M2 | WEIGHT: 165 LBS

## 2019-03-20 DIAGNOSIS — G35 MULTIPLE SCLEROSIS EXACERBATION (HCC): ICD-10-CM

## 2019-03-20 PROCEDURE — A9575 INJ GADOTERATE MEGLUMI 0.1ML: HCPCS | Performed by: PSYCHIATRY & NEUROLOGY

## 2019-03-20 PROCEDURE — 74011250636 HC RX REV CODE- 250/636: Performed by: PSYCHIATRY & NEUROLOGY

## 2019-03-20 PROCEDURE — 72156 MRI NECK SPINE W/O & W/DYE: CPT

## 2019-03-20 PROCEDURE — 72157 MRI CHEST SPINE W/O & W/DYE: CPT

## 2019-03-20 PROCEDURE — 70553 MRI BRAIN STEM W/O & W/DYE: CPT

## 2019-03-20 RX ORDER — GADOTERATE MEGLUMINE 376.9 MG/ML
15 INJECTION INTRAVENOUS
Status: COMPLETED | OUTPATIENT
Start: 2019-03-20 | End: 2019-03-20

## 2019-03-20 RX ADMIN — GADOTERATE MEGLUMINE 15 ML: 376.9 INJECTION INTRAVENOUS at 12:14

## 2019-03-20 NOTE — PROGRESS NOTES
MRI brain looks good. No change which is reassuring. No new lesions compared to the previous scan. No active disease either. All good news.

## 2019-03-21 NOTE — PROGRESS NOTES
Attempted to call patient again, voicemail picked up and asked for a voicemail code. Unable to leave a message at this time.

## 2019-04-05 RX ORDER — TIZANIDINE 4 MG/1
TABLET ORAL
Qty: 60 TAB | Refills: 0 | Status: SHIPPED | OUTPATIENT
Start: 2019-04-05 | End: 2019-05-16 | Stop reason: SDUPTHER

## 2019-05-16 RX ORDER — TIZANIDINE 4 MG/1
TABLET ORAL
Qty: 60 TAB | Refills: 0 | Status: SHIPPED | OUTPATIENT
Start: 2019-05-16 | End: 2019-06-19 | Stop reason: SDUPTHER

## 2019-05-19 ENCOUNTER — APPOINTMENT (OUTPATIENT)
Dept: CT IMAGING | Age: 64
End: 2019-05-19
Attending: STUDENT IN AN ORGANIZED HEALTH CARE EDUCATION/TRAINING PROGRAM
Payer: MEDICARE

## 2019-05-19 ENCOUNTER — HOSPITAL ENCOUNTER (EMERGENCY)
Age: 64
Discharge: HOME | End: 2019-05-19
Attending: STUDENT IN AN ORGANIZED HEALTH CARE EDUCATION/TRAINING PROGRAM | Admitting: STUDENT IN AN ORGANIZED HEALTH CARE EDUCATION/TRAINING PROGRAM
Payer: MEDICARE

## 2019-05-19 VITALS
RESPIRATION RATE: 16 BRPM | DIASTOLIC BLOOD PRESSURE: 70 MMHG | HEART RATE: 74 BPM | TEMPERATURE: 98.2 F | SYSTOLIC BLOOD PRESSURE: 128 MMHG | OXYGEN SATURATION: 97 %

## 2019-05-19 DIAGNOSIS — S09.90XA MINOR HEAD INJURY, INITIAL ENCOUNTER: ICD-10-CM
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
## 2019-05-19 DIAGNOSIS — S00.12XA CONTUSION OF LEFT PERIOCULAR REGION, INITIAL ENCOUNTER: ICD-10-CM

## 2019-05-19 PROCEDURE — 99285 EMERGENCY DEPT VISIT HI MDM: CPT

## 2019-05-19 PROCEDURE — 74011250637 HC RX REV CODE- 250/637: Performed by: STUDENT IN AN ORGANIZED HEALTH CARE EDUCATION/TRAINING PROGRAM

## 2019-05-19 PROCEDURE — 70450 CT HEAD/BRAIN W/O DYE: CPT

## 2019-05-19 RX ORDER — ACETAMINOPHEN 325 MG/1
650 TABLET ORAL ONCE
Status: COMPLETED | OUTPATIENT
Start: 2019-05-19 | End: 2019-05-19

## 2019-05-19 RX ADMIN — ACETAMINOPHEN 650 MG: 325 TABLET ORAL at 07:22

## 2019-05-19 NOTE — ED TRIAGE NOTES
Triage: Pt arrives from home via EMS with CC of ground level fall from bed. Pt hit her head and sustained periorbital bruising and swelling to left eye. Pt denies LOC. Pt denies use of blood thinners.   Pt A+Ox4 on arrival.

## 2019-05-19 NOTE — PROGRESS NOTES
Date of previous inpatient admission/ ED visit? No recent admission What brought the patient back to ED? Fall in the home Did patient decline recommended services during last admission/ ED visit (if yes, what)? No 
 
Has patient seen a provider since their last inpatient admission/ED visit (if yes, when)? PCP /Dr. Candance Broaden 2/27/19 CM Interventions: 
 
Niko Hurd is a 70 y.o. female presents via EMS from home with chief complaint of evaluation after GLF. She stayed on floor for hour, EMS had difficulties getting into home. ED workup completed, medically stable for discharge. Verified demographics, [facility] set up for transport back to home/stretcher transport. PCP: Niko Rangel MD 
 
Neurology:  Dr. Roro Benavidez Care Management Interventions PCP Verified by CM: Yes() Mode of Transport at Discharge: BLS([facility] - ETA 1000) Hospital Transport Time of Discharge: 1000 Transition of Care Consult (CM Consult): Discharge Planning(Stretcher transport) Plan discussed with Pt/Family/Caregiver: Yes Discharge Location Discharge Placement: Home(Discharging back to home) Reina Collier RN, BSN, [facility] 
ED Care Management [phone]

## 2019-05-19 NOTE — DISCHARGE INSTRUCTIONS
Patient Education        Bruised Face: Care Instructions  Your Care Instructions  You can get a bruise on your face if you fall or if something hits you in the face. The medical term for a bruise is \"contusion. \" Small blood vessels get torn and leak blood under the skin. Most people think of a bruise as a black-and-blue spot. But bones and muscles can also get bruised. This may damage deep tissues but not cause a bruise you can see. Your doctor will examine you and will gently press on your face to find areas that are tender. He or she will check your eyes, how well you can move face muscles near the bruise, and your feeling around the area to make sure there isn't a more serious injury, such as a broken bone or nerve damage. You may have tests, including X-rays or other imaging tests like a CT scan. Bruises may cause pain and swelling. But if there is no other damage, they will usually get better in a few weeks with home treatment. Follow-up care is a key part of your treatment and safety. Be sure to make and go to all appointments, and call your doctor if you are having problems. It's also a good idea to know your test results and keep a list of the medicines you take. How can you care for yourself at home? · Put ice or a cold pack on your face for 10 to 20 minutes at a time. Put a thin cloth between the ice and your skin. Try to do this every 1 to 2 hours for the first 3 days (when you are awake) or until the swelling goes down. · Sleep with your head slightly raised until the swelling goes down. Prop up your head and shoulders on pillows. · If you play contact sports, ask your doctor when it's okay to play again. It's safest to wait at least 6 weeks. · Be safe with medicines. Read and follow all instructions on the label. ? If the doctor gave you a prescription medicine for pain, take it as prescribed.   ? If you are not taking a prescription pain medicine, ask your doctor if you can take an over-the-counter medicine. When should you call for help? Call your doctor now or seek immediate medical care if:    · Your pain gets worse.     · You have new or worse swelling.     · You have new or worse bleeding.     · The area near the bruise is tingly, weak, or numb.     · You have vision changes.    Watch closely for changes in your health, and be sure to contact your doctor if:    · You do not get better as expected. Where can you learn more? Go to http://sudheer-chemo.info/. Enter T266 in the search box to learn more about \"Bruised Face: Care Instructions. \"  Current as of: September 23, 2018  Content Version: 11.9  © 6596-6913 [employer]. Care instructions adapted under license by [employer] (which disclaims liability or warranty for this information). If you have questions about a medical condition or this instruction, always ask your healthcare professional. [place] [address] any warranty or liability for your use of this information. Patient Education        Preventing Falls: Care Instructions  Your Care Instructions    Getting around your home safely can be a challenge if you have injuries or health problems that make it easy for you to fall. Loose rugs and furniture in walkways are among the dangers for many older people who have problems walking or who have poor eyesight. People who have conditions such as arthritis, osteoporosis, or dementia also have to be careful not to fall. You can make your home safer with a few simple measures. Follow-up care is a key part of your treatment and safety. Be sure to make and go to all appointments, and call your doctor if you are having problems. It's also a good idea to know your test results and keep a list of the medicines you take. How can you care for yourself at home? Taking care of yourself  · You may get dizzy if you do not drink enough water.  To prevent dehydration, drink plenty of fluids, enough so that your urine is light yellow or clear like water. Choose water and other caffeine-free clear liquids. If you have kidney, heart, or liver disease and have to limit fluids, talk with your doctor before you increase the amount of fluids you drink. · Exercise regularly to improve your strength, muscle tone, and balance. Walk if you can. Swimming may be a good choice if you cannot walk easily. · Have your vision and hearing checked each year or any time you notice a change. If you have trouble seeing and hearing, you might not be able to avoid objects and could lose your balance. · Know the side effects of the medicines you take. Ask your doctor or pharmacist whether the medicines you take can affect your balance. Sleeping pills or sedatives can affect your balance. · Limit the amount of alcohol you drink. Alcohol can impair your balance and other senses. · Ask your doctor whether calluses or corns on your feet need to be removed. If you wear loose-fitting shoes because of calluses or corns, you can lose your balance and fall. · Talk to your doctor if you have numbness in your feet. Preventing falls at home  · Remove raised doorway thresholds, throw rugs, and clutter. Repair loose carpet or raised areas in the floor. · Move furniture and electrical cords to keep them out of walking paths. · Use nonskid floor wax, and wipe up spills right away, especially on ceramic tile floors. · If you use a walker or cane, put rubber tips on it. If you use crutches, clean the bottoms of them regularly with an abrasive pad, such as steel wool. · Keep your house well lit, especially [employer], and outside walkways. Use night-lights in areas such as hallways and bathrooms. Add extra light switches or use remote switches (such as switches that go on or off when you clap your hands) to make it easier to turn lights on if you have to get up during the night.   · Install sturdy handrails on stairways. · Move items in your cabinets so that the things you use a lot are on the lower shelves (about waist level). · Keep a cordless phone and a flashlight with new batteries by your bed. If possible, put a phone in each of the main rooms of your house, or carry a cell phone in case you fall and cannot reach a phone. Or, you can wear a device around your neck or wrist. You push a button that sends a signal for help. · Wear low-heeled shoes that fit well and give your feet good support. Use footwear with nonskid soles. Check the heels and soles of your shoes for wear. Repair or replace worn heels or soles. · Do not wear socks without shoes on wood floors. · Walk on the grass when the sidewalks are slippery. If you live in an area that gets snow and ice in the winter, sprinkle salt on slippery steps and sidewalks. Preventing falls in the bath  · Install grab bars and nonskid mats inside and outside your shower or tub and near the toilet and sinks. · Use shower chairs and bath benches. · Use a hand-held shower head that will allow you to sit while showering. · Get into a tub or shower by putting the weaker leg in first. Get out of a tub or shower with your strong side first.  · Repair loose toilet seats and consider installing a raised toilet seat to make getting on and off the toilet easier. · Keep your bathroom door unlocked while you are in the shower. Where can you learn more? Go to http://sudheer-chemo.info/. Enter 0476 [id number] in the search box to learn more about \"Preventing Falls: Care Instructions. \"  Current as of: March 16, 2018  Content Version: 11.8  © 6630-7555 [employer]. Care instructions adapted under license by [employer] (which disclaims liability or warranty for this information).  If you have questions about a medical condition or this instruction, always ask your healthcare professional. [place] disclaims any warranty or liability for your use of this information.

## 2019-05-19 NOTE — ED PROVIDER NOTES
70 y.o. female with past medical history significant for MS and Mitral Valve Prolapse who presents via EMS from home with chief complaint of evaluation after GLF. Patient states prior to arrival she rolled out of her bed onto the carpeted floor. Patient endorses head trauma as she struck her head on a \"ballerina bar\" that is beside her bed to help her ambulate, but denies LOC. Per EMS, patient was on the floor for ~1 hour due to difficulties getting into the patient's private residence. Per EMS, en route to [facility] ED patient's blood sugar 111 and blood pressure 474 systolic. Patient presents to [facility] ED this morning with mild headache rated as 2/10 in severity, as well as slight pain at the site of impact over the left eyebrow with associated swelling. Patient also notes her neck feels \"a little stiff\", but maintains full ROM. Patient ambulates with a wheelchair at baseline. Patient endorses history of MS. Patient denies taking a blood thinner or anticoagulation therapy. Patient specifically denies neck pain, SOB, hip pain, wrist pain, nausea, vomiting, diarrhea, abdominal pain, or fever. There are no other acute medical concerns at this time. PCP: Holly Lechuga MD 
 
Note written by Humbetro Gayle, as dictated by Orin Martínez MD 7:13 AM 
 
 
The history is provided by the patient and the EMS personnel. Past Medical History:  
Diagnosis Date  Multiple sclerosis ([facility] [address]  Neurological disorder Multiple sclerosis, in remission 2011  Other ill-defined conditions(799.89) Mitral Value Prolapse Past Surgical History:  
Procedure Laterality Date  ABDOMEN SURGERY PROC UNLISTED    
 hernia repair  HX HYSTERECTOMY No family history on file. Social History Socioeconomic History  Marital status: SINGLE Spouse name: Not on file  Number of children: Not on file  Years of education: Not on file  Highest education level: Not on file Occupational History  Not on file Social Needs  Financial resource strain: Not on file  Food insecurity:  
  Worry: Not on file Inability: Not on file  Transportation needs:  
  Medical: Not on file Non-medical: Not on file Tobacco Use  Smoking status: Former Smoker  Smokeless tobacco: Never Used Substance and Sexual Activity  Alcohol use: Yes Comment: social  
 Drug use: Yes Types: Marijuana Comment: occasional marijuana  Sexual activity: Not Currently Lifestyle  Physical activity:  
  Days per week: Not on file Minutes per session: Not on file  Stress: Not on file Relationships  Social connections:  
  Talks on phone: Not on file Gets together: Not on file Attends Uats[employer] service: Not on file Active member of club or organization: Not on file Attends meetings of clubs or organizations: Not on file Relationship status: Not on file  Intimate partner violence:  
  Fear of current or ex partner: Not on file Emotionally abused: Not on file Physically abused: Not on file Forced sexual activity: Not on file Other Topics Concern  Not on file Social History Narrative  Not on file ALLERGIES: Patient has no known allergies. Review of Systems Constitutional: Negative for chills and fever. HENT: Negative for sore throat. Respiratory: Negative for cough and shortness of breath. Cardiovascular: Negative for chest pain. Gastrointestinal: Negative for abdominal pain, diarrhea, nausea and vomiting. Genitourinary: Negative for difficulty urinating and dysuria. Musculoskeletal: Positive for neck stiffness. Negative for arthralgias (hip pain, wrist pain), back pain and neck pain. Skin: Positive for wound (hematoma). Negative for rash. Neurological: Positive for headaches. Negative for syncope. Psychiatric/Behavioral: Negative for confusion. All other systems reviewed and are negative. There were no vitals filed for this visit. Physical Exam  
Constitutional: She is oriented to person, place, and time. She appears well-developed. No distress. HENT:  
Small hematoma over left eyebrow Eyes: Conjunctivae and EOM are normal.  
Neck: Normal range of motion and full passive range of motion without pain. Neck supple. Cardiovascular: Normal rate, regular rhythm and normal heart sounds. Pulmonary/Chest: Effort normal and breath sounds normal. No respiratory distress. Abdominal: Soft. There is no tenderness. There is no guarding. Musculoskeletal: Normal range of motion. She exhibits no edema. No C spine tenderness to palpation, pelvis stable Neurological: She is alert and oriented to person, place, and time. She exhibits normal muscle tone. Skin: Skin is warm and dry. No open wounds Nursing note and vitals reviewed. Note written by Humberto Escamilla, as dictated by Marvie Soulier, MD 7:12 AM 
  
 
MDM Procedures The patient presented with a complaint of a fall or minor trauma. The patient is now resting comfortably and feels better, is alert and in no distress. The patient has a normal mental status and is neurologically intact. The history, exam, diagnostic testing (if any) and current condition do not demonstrate signs of clinically significant intra-cranial, intra-thoracic, intra-abdominal, or musculoskeletal trauma. The vital signs have been stable. The patient's condition is stable and appropriate for discharge. The patient will pursue further outpatient evaluation with the primary care physician or other designated or consulting physician as indicated in the discharge instructions.

## 2019-06-19 RX ORDER — TIZANIDINE 4 MG/1
TABLET ORAL
Qty: 60 TAB | Refills: 0 | Status: SHIPPED | OUTPATIENT
Start: 2019-06-19 | End: 2019-07-30 | Stop reason: SDUPTHER

## 2019-07-30 RX ORDER — TIZANIDINE 4 MG/1
TABLET ORAL
Qty: 60 TAB | Refills: 0 | Status: SHIPPED | OUTPATIENT
Start: 2019-07-30 | End: 2019-09-03 | Stop reason: SDUPTHER

## 2019-08-19 DIAGNOSIS — Z12.39 BREAST CANCER SCREENING: Primary | ICD-10-CM

## 2019-09-03 RX ORDER — TIZANIDINE 4 MG/1
TABLET ORAL
Qty: 60 TAB | Refills: 0 | Status: SHIPPED | OUTPATIENT
Start: 2019-09-03 | End: 2019-10-08 | Stop reason: SDUPTHER

## 2019-10-07 ENCOUNTER — HOSPITAL ENCOUNTER (OUTPATIENT)
Dept: MAMMOGRAPHY | Age: 64
Discharge: HOME | End: 2019-10-07
Attending: FAMILY MEDICINE
Payer: MEDICARE

## 2019-10-07 DIAGNOSIS — Z12.39 BREAST CANCER SCREENING: ICD-10-CM

## 2019-10-07 PROCEDURE — 77067 SCR MAMMO BI INCL CAD: CPT

## 2019-10-08 RX ORDER — TIZANIDINE 4 MG/1
TABLET ORAL
Qty: 60 TAB | Refills: 0 | Status: SHIPPED | OUTPATIENT
Start: 2019-10-08 | End: 2019-11-09 | Stop reason: SDUPTHER

## 2019-10-23 ENCOUNTER — OFFICE VISIT (OUTPATIENT)
Dept: NEUROLOGY | Age: 64
End: 2019-10-23

## 2019-10-23 VITALS
HEIGHT: 63 IN | DIASTOLIC BLOOD PRESSURE: 76 MMHG | BODY MASS INDEX: 30.12 KG/M2 | OXYGEN SATURATION: 98 % | WEIGHT: 170 LBS | RESPIRATION RATE: 14 BRPM | HEART RATE: 79 BPM | SYSTOLIC BLOOD PRESSURE: 120 MMHG

## 2019-10-23 DIAGNOSIS — G35 MULTIPLE SCLEROSIS (HCC): Primary | ICD-10-CM

## 2019-10-23 RX ORDER — INTERFERON BETA-1A 30 UG/.5ML
30 INJECTION, SOLUTION INTRAMUSCULAR ONCE
Qty: 1 KIT | Refills: 0
Start: 2019-10-23 | End: 2019-10-23

## 2019-10-23 NOTE — PROGRESS NOTES
Chief Complaint   Patient presents with    Multiple Sclerosis       HPI    68-year-old woman here to follow-up. She has MS. We completed neuroimaging earlier this year of the brain, cervical, thoracic spines all with stable disease and no enhancement or progression. Since I saw her last she saw urogynecology and now has a permanent catheter. She has incontinence. Right leg remains essentially paralyzed without change. Nonpainful. She is interested in resuming disease modifying treatment. BKGD:  Ms. Johnnette Ahumada is a 68-year-old woman with a history of MS diagnosed in 1989 after suffering from left sided optic neuritis. She underwent testing to include MRI which confirmed the diagnosis. She was initially started on Avonex injections weekly and had recurrent episodes requiring steroid treatments. She stopped disease modifying drug treatment in 2005 and opted for a more natural alternative based approach. However, in the last few months she has noticed that her MS symptoms have flared and not recovering well. She has right leg numbness and weakness ongoing for 2 months. MS treatment history, Avonex, Rebif        Review of Systems   Eyes: Negative for double vision. Musculoskeletal: Negative for falls. Neurological: Positive for focal weakness. All other systems reviewed and are negative.       Past Medical History:   Diagnosis Date    Depression     Menopause     Multiple sclerosis ([facility] [address]     Neurological disorder     Multiple sclerosis, in remission 2011    Other ill-defined conditions(799.89)     Mitral Value Prolapse     Family History   Problem Relation Age of Onset    Breast Cancer Mother         52's     Social History     Socioeconomic History    Marital status: SINGLE     Spouse name: Not on file    Number of children: Not on file    Years of education: Not on file    Highest education level: Not on file   Occupational History    Not on file   Social Needs    Financial resource strain: Not on file    Food insecurity:     Worry: Not on file     Inability: Not on file    Transportation needs:     Medical: Not on file     Non-medical: Not on file   Tobacco Use    Smoking status: Former Smoker    Smokeless tobacco: Never Used   Substance and Sexual Activity    Alcohol use: Yes     Alcohol/week: 1.0 standard drinks     Types: 1 Glasses of wine per week     Comment: rarely    Drug use: Yes     Types: Marijuana     Comment: occasional marijuana    Sexual activity: Not Currently   Lifestyle    Physical activity:     Days per week: Not on file     Minutes per session: Not on file    Stress: Not on file   Relationships    Social connections:     Talks on phone: Not on file     Gets together: Not on file     Attends Jew service: Not on file     Active member of club or organization: Not on file     Attends meetings of clubs or organizations: Not on file     Relationship status: Not on file    Intimate partner violence:     Fear of current or ex partner: Not on file     Emotionally abused: Not on file     Physically abused: Not on file     Forced sexual activity: Not on file   Other Topics Concern    Not on file   Social History Narrative    Not on file     No Known Allergies      Current Outpatient Medications   Medication Sig    interferon beta-1a (AVONEX) 30 mcg/0.5 mL injection 0.5 mL by IntraMUSCular route once for 1 dose.  tiZANidine (ZANAFLEX) 4 mg tablet TAKE ONE TABLET BY MOUTH EVERY 8 HOURS AS NEEDED    sertraline (ZOLOFT) 50 mg tablet TAKE ONE TABLET BY MOUTH DAILY    bisacodyl (DULCOLAX) 10 mg suppository Insert 10 mg into rectum daily as needed.  oxybutynin (DITROPAN) 5 mg tablet Take 1 Tab by mouth three (3) times daily. No current facility-administered medications for this visit. Neurologic Exam     Mental Status     Mental Status   WD/WN adult in NAD, normal grooming, smells of urine  VSS  A&O, pleasant and appropriate.       PERRL, nonicteric  Face is asymmetric, tongue midline  Speech is fluent and clear  No limb ataxia. No abnl movements. Globally weak upper extremities 4/5  Right lower extremity 1/5  Left lower extremity 4/5  Reduced sensation nondermatomal throughout  Moving all extemities spontaneously and symmetric  Gait deferred due to focal right leg weakness, w/c bound    CVS RRR  Lungs nonlabored  Skin is warm and dry       Visit Vitals  /76   Pulse 79   Resp 14   Ht 5' 3\" (1.6 m)   Wt 77.1 kg (170 lb)   SpO2 98%   BMI 30.11 kg/m²       Assessment and Plan   Diagnoses and all orders for this visit:    1. Multiple sclerosis ([facility] [address]    Other orders  -     interferon beta-1a (AVONEX) 30 mcg/0.5 mL injection; 0.5 mL by IntraMUSCular route once for 1 dose. 47-year-old woman with MS. We had a long conversation about her imaging which was overall stable without new disease. She would like to resume Avonex in this case. We talked about multiple options but she prefers Avonex which I think is reasonable. We will initiate the paperwork. We will repeat imaging in about a year to reassess as if there are changes we might need to escalate our level of treatment. Right leg paralysis is probably permanent from MS disease. I reviewed and decided to continue the current medications. This clinical note was dictated with an electronic dictation software that can make unintentional errors. If there are any questions, please contact me directly for clarification.       2 [facility], [facility] [address]  Diplomate JAME

## 2019-10-23 NOTE — PROGRESS NOTES
Pt here to f/u up on her MS. Having trouble controlling her bladder so she now has indwelling catheter. Unable to move right leg and she had her cataracts removed.

## 2019-10-24 ENCOUNTER — TELEPHONE (OUTPATIENT)
Dept: NEUROLOGY | Age: 64
End: 2019-10-24

## 2019-10-24 NOTE — TELEPHONE ENCOUNTER
Re: Avonex Denial    Denial rec'd from [facility]    The drug is non-formulary. Requesting a trial of one of the preferred drugs, including Betaseron subcutaneous kit, Copaxone subcutaneous syringe, Gilenya capsule, glatiramer subcutaneous syringe, and Tecfidera capsule delayed release. Copy of denial letter scanned into chart.

## 2019-11-11 RX ORDER — TIZANIDINE 4 MG/1
TABLET ORAL
Qty: 60 TAB | Refills: 0 | Status: SHIPPED | OUTPATIENT
Start: 2019-11-11 | End: 2019-12-12 | Stop reason: SDUPTHER

## 2019-12-12 RX ORDER — TIZANIDINE 4 MG/1
TABLET ORAL
Qty: 60 TAB | Refills: 0 | Status: SHIPPED | OUTPATIENT
Start: 2019-12-12 | End: 2020-01-24

## 2020-01-13 RX ORDER — SERTRALINE HYDROCHLORIDE 50 MG/1
TABLET, FILM COATED ORAL
Qty: 30 TAB | Refills: 1 | Status: SHIPPED | OUTPATIENT
Start: 2020-01-13 | End: 2020-03-12

## 2020-01-24 RX ORDER — TIZANIDINE 4 MG/1
TABLET ORAL
Qty: 60 TAB | Refills: 0 | Status: SHIPPED | OUTPATIENT
Start: 2020-01-24 | End: 2020-03-02

## 2020-03-02 RX ORDER — TIZANIDINE 4 MG/1
TABLET ORAL
Qty: 60 TAB | Refills: 0 | Status: SHIPPED | OUTPATIENT
Start: 2020-03-02 | End: 2020-04-06

## 2020-03-12 RX ORDER — SERTRALINE HYDROCHLORIDE 50 MG/1
TABLET, FILM COATED ORAL
Qty: 30 TAB | Refills: 0 | Status: SHIPPED | OUTPATIENT
Start: 2020-03-12 | End: 2020-04-06

## 2020-04-06 RX ORDER — SERTRALINE HYDROCHLORIDE 50 MG/1
TABLET, FILM COATED ORAL
Qty: 30 TAB | Refills: 0 | Status: SHIPPED | OUTPATIENT
Start: 2020-04-06 | End: 2020-05-15 | Stop reason: SDUPTHER

## 2020-04-06 RX ORDER — TIZANIDINE 4 MG/1
TABLET ORAL
Qty: 60 TAB | Refills: 0 | Status: SHIPPED | OUTPATIENT
Start: 2020-04-06 | End: 2020-05-15 | Stop reason: SDUPTHER

## 2020-05-15 ENCOUNTER — VIRTUAL VISIT (OUTPATIENT)
Dept: INTERNAL MEDICINE CLINIC | Age: 65
End: 2020-05-15

## 2020-05-15 DIAGNOSIS — Z13.39 SCREENING FOR ALCOHOLISM: ICD-10-CM

## 2020-05-15 DIAGNOSIS — Z12.11 SCREEN FOR COLON CANCER: ICD-10-CM

## 2020-05-15 DIAGNOSIS — Z78.0 POSTMENOPAUSAL STATE: ICD-10-CM

## 2020-05-15 DIAGNOSIS — Z13.31 SCREENING FOR DEPRESSION: ICD-10-CM

## 2020-05-15 DIAGNOSIS — Z00.00 MEDICARE ANNUAL WELLNESS VISIT, SUBSEQUENT: Primary | ICD-10-CM

## 2020-05-15 DIAGNOSIS — Z13.6 SCREENING FOR ISCHEMIC HEART DISEASE: ICD-10-CM

## 2020-05-15 DIAGNOSIS — Z23 NEED FOR SHINGLES VACCINE: ICD-10-CM

## 2020-05-15 DIAGNOSIS — F32.0 CURRENT MILD EPISODE OF MAJOR DEPRESSIVE DISORDER WITHOUT PRIOR EPISODE (HCC): ICD-10-CM

## 2020-05-15 RX ORDER — SERTRALINE HYDROCHLORIDE 50 MG/1
TABLET, FILM COATED ORAL
Qty: 90 TAB | Refills: 1 | Status: SHIPPED | OUTPATIENT
Start: 2020-05-15 | End: 2020-05-15 | Stop reason: SDUPTHER

## 2020-05-15 RX ORDER — ZOSTER VACCINE RECOMBINANT, ADJUVANTED 50 MCG/0.5
KIT INTRAMUSCULAR
Qty: 0.5 ML | Refills: 1 | Status: SHIPPED | OUTPATIENT
Start: 2020-05-15

## 2020-05-15 RX ORDER — TIZANIDINE 4 MG/1
TABLET ORAL
Qty: 60 TAB | Refills: 0 | Status: SHIPPED | OUTPATIENT
Start: 2020-05-15 | End: 2020-06-29

## 2020-05-15 NOTE — PROGRESS NOTES
Zakiya Hoover is a 75 y.o. female who was seen by synchronous (real-time) audio-video technology on 5/15/2020. Consent: Zakiya Hoover, who was seen by synchronous (real-time) audio-video technology, and/or her healthcare decision maker, is aware that this patient-initiated, Telehealth encounter on 5/15/2020 is a billable service, with coverage as determined by her insurance carrier. She is aware that she may receive a bill and has provided verbal consent to proceed: Yes. Assessment & Plan:   Diagnoses and all orders for this visit:    1. Medicare annual wellness visit, subsequent    2. Need for shingles vaccine  -     varicella-zoster recombinant, PF, (Shingrix, PF,) 50 mcg/0.5 mL susr injection; 0.5mL by IntraMUSCular route once now and then repeat in 2-6 months    3. Current mild episode of major depressive disorder without prior episode ([facility] [address] [facility]    4. Screening for alcoholism  -     [place] ANNUAL ALCOHOL SCREEN 15 MIN    5. Screening for depression  -     DEPRESSION SCREEN ANNUAL    6. Screen for colon cancer  -     COLOGUARD TEST (FECAL DNA COLORECTAL CANCER SCREENING)    7. Screening for ischemic heart disease  -     LIPID PANEL    8. Postmenopausal state  -     DEXA BONE DENSITY STUDY AXIAL; Future    Other orders  -     tiZANidine (ZANAFLEX) 4 mg tablet; TAKE ONE TABLET BY MOUTH EVERY 8 HOURS AS NEEDED  -     sertraline (ZOLOFT) 50 mg tablet; TAKE ONE TABLET BY MOUTH DAILY              Subjective:   Zakiya Hoover is a 75 y.o. female who was seen for anxiety depression. Patient states that she is been out of medications for 2 days, denies any worsening symptoms or side effects. Patient states that she has no suicidal homicidal ideations and does not feel depressed currently. She would like to keep the medication going due to the current coronavirus as her anxiety does increase on occasion. Prior to Admission medications    Medication Sig Start Date End Date Taking?  Authorizing Provider   tiZANidine (ZANAFLEX) 4 mg tablet TAKE ONE TABLET BY MOUTH EVERY 8 HOURS AS NEEDED 5/15/20  Yes Mesfin Dong MD   sertraline (ZOLOFT) 50 mg tablet TAKE ONE TABLET BY MOUTH DAILY 5/15/20  Yes Mesfin Dong MD   oxybutynin (DITROPAN) 5 mg tablet Take 1 Tab by mouth three (3) times daily. 2/4/19   Anastacia Serna,    bisacodyl (DULCOLAX) 10 mg suppository Insert 10 mg into rectum daily as needed. 3/1/17   Kiera Trivedi MD     No Known Allergies    Patient Active Problem List    Diagnosis Date Noted    Closed fracture of proximal end of left fibula 08/20/2018    Inability to ambulate due to left ankle or foot 08/20/2018    Multiple sclerosis exacerbation ([facility] 02/24/2017     Current Outpatient Medications   Medication Sig Dispense Refill    tiZANidine (ZANAFLEX) 4 mg tablet TAKE ONE TABLET BY MOUTH EVERY 8 HOURS AS NEEDED 60 Tab 0    sertraline (ZOLOFT) 50 mg tablet TAKE ONE TABLET BY MOUTH DAILY 90 Tab 1    varicella-zoster recombinant, PF, (Shingrix, PF,) 50 mcg/0.5 mL susr injection 0.5mL by IntraMUSCular route once now and then repeat in 2-6 months 0.5 mL 1    oxybutynin (DITROPAN) 5 mg tablet Take 1 Tab by mouth three (3) times daily. 90 Tab 1    bisacodyl (DULCOLAX) 10 mg suppository Insert 10 mg into rectum daily as needed. 1 Each 0     No Known Allergies  Past Medical History:   Diagnosis Date    Depression     Menopause     [facility]     Neurological disorder     Multiple sclerosis, in remission 2011    Other ill-defined conditions(799.89)     Mitral Value Prolapse     Past Surgical History:   Procedure Laterality Date    ABDOMEN SURGERY PROC UNLISTED      hernia repair    HX HEENT      HX HYSTERECTOMY       Family History   Problem Relation Age of Onset   [place] Breast Cancer Mother         52's       ROS    Objective:   Vital Signs: (As obtained by patient/caregiver at home)  There were no vitals taken for this visit.      [INSTRUCTIONS:  \"[x]\" Indicates a positive item  \"[]\" Indicates a negative item  -- DELETE ALL ITEMS NOT EXAMINED]    Constitutional: [x] Appears well-developed and well-nourished [x] No apparent distress      [] Abnormal -     Mental status: [x] Alert and awake  [x] Oriented to person/place/time [x] Able to follow commands    [] Abnormal -     Eyes:   EOM    [x]  Normal    [] Abnormal -   Sclera  [x]  Normal    [] Abnormal -          Discharge [x]  None visible   [] Abnormal -     HENT: [x] Normocephalic, atraumatic  [] Abnormal -   [x] Mouth/Throat: Mucous membranes are moist    External Ears [x] Normal  [] Abnormal -    Neck: [x] No visualized mass [] Abnormal -     Pulmonary/Chest: [x] Respiratory effort normal   [x] No visualized signs of difficulty breathing or respiratory distress        [] Abnormal -      Musculoskeletal:   [x] Normal gait with no signs of ataxia         [x] Normal range of motion of neck        [] Abnormal -     Neurological:        [x] No Facial Asymmetry (Cranial nerve 7 motor function) (limited exam due to video visit)          [x] No gaze palsy        [] Abnormal -          Skin:        [x] No significant exanthematous lesions or discoloration noted on facial skin         [] Abnormal -            Psychiatric:       [x] Normal Affect [] Abnormal -        [x] No Hallucinations    Other pertinent observable physical exam findings:-        We discussed the expected course, resolution and complications of the diagnosis(es) in detail. Medication risks, benefits, costs, interactions, and alternatives were discussed as indicated. I advised her to contact the office if her condition worsens, changes or fails to improve as anticipated. She expressed understanding with the diagnosis(es) and plan. Sean Warren is a 75 y.o. female who was evaluated by a video visit encounter for concerns as above. Patient identification was verified prior to start of the visit. A caregiver was present when appropriate.  Due to this being a TeleHealth encounter (During HZGEL-72 public health emergency), evaluation of the following organ systems was limited: Vitals/Constitutional/EENT/Resp/CV/GI//MS/Neuro/Skin/Heme-Lymph-Imm. Pursuant to the emergency declaration under the [facility] waiver authority and the [employer] and [employer] Act, this Virtual  Visit was conducted, with patient's (and/or legal guardian's) consent, to reduce the patient's risk of exposure to COVID-19 and provide necessary medical care. Services were provided through a video synchronous discussion virtually to substitute for in-person clinic visit. Patient and provider were located at their individual homes. Rodney Mckeon MD    This is the Subsequent Medicare Annual Wellness Exam, performed 12 months or more after the Initial AWV or the last Subsequent AWV    Consent: Jaz Calderon, who was seen by synchronous (real-time) audio-video technology, and/or her healthcare decision maker, is aware that this patient-initiated, Telehealth encounter on 5/15/2020 is a billable service. While AWVs are fully covered by Medicare, any services rendered on this date that are not included in an AWV are subject to additional billing, with coverage as determined by her insurance carrier. She is aware that she may receive a bill for any such additional services and has provided verbal consent to proceed: Yes. I have reviewed the patient's medical history in detail and updated the computerized patient record.      History     Patient Active Problem List   Diagnosis Code    Multiple sclerosis exacerbation ([facility] G35    Closed fracture of proximal end of left fibula S82.832A    Inability to ambulate due to left ankle or foot R26.2     Past Medical History:   Diagnosis Date    Depression     Menopause     Multiple sclerosis ([facility]     Neurological disorder     Multiple sclerosis, in remission 2011    Other ill-defined conditions(799.89)     Mitral Value Prolapse      Past Surgical History:   Procedure Laterality Date    ABDOMEN SURGERY PROC UNLISTED      hernia repair    HX HEENT      HX HYSTERECTOMY       Current Outpatient Medications   Medication Sig Dispense Refill    tiZANidine (ZANAFLEX) 4 mg tablet TAKE ONE TABLET BY MOUTH EVERY 8 HOURS AS NEEDED 60 Tab 0    sertraline (ZOLOFT) 50 mg tablet TAKE ONE TABLET BY MOUTH DAILY 90 Tab 1    varicella-zoster recombinant, PF, (Shingrix, PF,) 50 mcg/0.5 mL susr injection 0.5mL by IntraMUSCular route once now and then repeat in 2-6 months 0.5 mL 1    oxybutynin (DITROPAN) 5 mg tablet Take 1 Tab by mouth three (3) times daily. 90 Tab 1    bisacodyl (DULCOLAX) 10 mg suppository Insert 10 mg into rectum daily as needed. 1 Each 0     No Known Allergies    Family History   Problem Relation Age of Onset    Breast Cancer Mother         52's     Social History     Tobacco Use    Smoking status: Former Smoker    Smokeless tobacco: Never Used   Substance Use Topics    Alcohol use: Yes     Alcohol/week: 1.0 standard drinks     Types: 1 Glasses of wine per week     Comment: rarely       Depression Risk Factor Screening:     3 most recent PHQ Screens 2/27/2019   PHQ Not Done -   Little interest or pleasure in doing things Not at all   Feeling down, depressed, irritable, or hopeless Not at all   Total Score PHQ 2 0       Alcohol Risk Factor Screening:   Do you average 1 drink per night or more than 7 drinks a week:  No    On any one occasion in the past three months have you have had more than 3 drinks containing alcohol:  No      Functional Ability and Level of Safety:   Hearing: Hearing is good. Activities of Daily Living: The home contains: no safety equipment. Patient does total self care    Ambulation: with no difficulty    Fall Risk:  Fall Risk Assessment, last 12 mths 10/23/2019   Able to walk?  No   Fall in past 12 months? -   Fall with injury? -   Number of falls in past 15 months -   Fall Risk Score -       Abuse Screen:  Patient is not abused    Cognitive Screening   Has your family/caregiver stated any concerns about your memory: no  Cognitive Screening: Normal - MMSE (Mini Mental Status Exam)    Patient Care Team   Patient Care Team:  Lauren Huff MD as PCP - General (Family Practice)  Lauren Huff MD as PCP [facility]    Assessment/Plan   Education and counseling provided:  Are appropriate based on today's review and evaluation    Diagnoses and all orders for this visit:    1. Medicare annual wellness visit, subsequent    2. Need for shingles vaccine  -     varicella-zoster recombinant, PF, (Shingrix, PF,) 50 mcg/0.5 mL susr injection; 0.5mL by IntraMUSCular route once now and then repeat in 2-6 months    3. Current mild episode of major depressive disorder without prior episode ([facility]    4. Screening for alcoholism  -     [place] ANNUAL ALCOHOL SCREEN 15 MIN    5. Screening for depression  -     DEPRESSION SCREEN ANNUAL    6. Screen for colon cancer  -     COLOGUARD TEST (FECAL DNA COLORECTAL CANCER SCREENING)    7. Screening for ischemic heart disease  -     LIPID PANEL    8. Postmenopausal state  -     DEXA BONE DENSITY STUDY AXIAL; Future    Other orders  -     tiZANidine (ZANAFLEX) 4 mg tablet; TAKE ONE TABLET BY MOUTH EVERY 8 HOURS AS NEEDED  -     sertraline (ZOLOFT) 50 mg tablet; TAKE ONE TABLET BY MOUTH DAILY        Health Maintenance Due   Topic Date Due    DTaP/Tdap/Td series (1 - Tdap) 08/20/1974    Shingrix Vaccine Age 50> (1 of 2) 08/20/2003    GLAUCOMA SCREENING Q2Y  08/20/2018    Bone Densitometry (Dexa) Screening  08/20/2018    Pneumococcal 65+ years (1 of 1 - PPSV23) 08/20/2018    FOBT Q1Y Age 50-75  10/10/2019    Medicare Yearly Exam  10/11/2019       Elba Lipscomb is a 75 y.o. female who was evaluated by an audio-video encounter for concerns as above. Patient identification was verified prior to start of the visit.  A caregiver was present when appropriate. Due to this being a TeleHealth encounter (During TWOJP-03 public health emergency), evaluation of the following organ systems was limited: Vitals/Constitutional/EENT/Resp/CV/GI//MS/Neuro/Skin/Heme-Lymph-Imm. Pursuant to the emergency declaration under the [address] [place] [address] waiver authority and the [email or web address] and [employer] Act, this Virtual Visit was conducted, with patient's (and/or legal guardian's) consent, to reduce the patient's risk of exposure to COVID-19 and provide necessary medical care. Services were provided through a synchronous discussion virtually to substitute for in-person clinic visit. I was at home. The patient was at home.       Lamar Castillo MD

## 2020-05-15 NOTE — PATIENT INSTRUCTIONS
Medicare Wellness Visit, Female The best way to live healthy is to have a lifestyle where you eat a well-balanced diet, exercise regularly, limit alcohol use, and quit all forms of tobacco/nicotine, if applicable. Regular preventive services are another way to keep healthy. Preventive services (vaccines, screening tests, monitoring & exams) can help personalize your care plan, which helps you manage your own care. Screening tests can find health problems at the earliest stages, when they are easiest to treat. [facility] follows the current, evidence-based guidelines published by the [facility] ([facility]) when recommending preventive services for our patients. Because we follow these guidelines, sometimes recommendations change over time as research supports it. (For example, mammograms used to be recommended annually. Even though Medicare will still pay for an annual mammogram, the newer guidelines recommend a mammogram every two years for women of average risk). Of course, you and your doctor may decide to screen more often for some diseases, based on your risk and your co-morbidities (chronic disease you are already diagnosed with). Preventive services for you include: - Medicare offers their members a free annual wellness visit, which is time for you and your primary care provider to discuss and plan for your preventive service needs. Take advantage of this benefit every year! 
-All adults over the age of 72 should receive the recommended pneumonia vaccines. Current USPSTF guidelines recommend a series of two vaccines for the best pneumonia protection.  
-All adults should have a flu vaccine yearly and a tetanus vaccine every 10 years.  
-All adults age 48 and older should receive the shingles vaccines (series of two vaccines). -All adults age 38-68 who are overweight should have a diabetes screening test once every three years. -All adults born between [birth year] and 1965 should be screened once for Hepatitis C. 
-Other screening tests and preventive services for persons with diabetes include: an eye exam to screen for diabetic retinopathy, a kidney function test, a foot exam, and stricter control over your cholesterol.  
-Cardiovascular screening for adults with routine risk involves an electrocardiogram (ECG) at intervals determined by your doctor.  
-Colorectal cancer screenings should be done for adults age 54-65 with no increased risk factors for colorectal cancer. There are a number of acceptable methods of screening for this type of cancer. Each test has its own benefits and drawbacks. Discuss with your doctor what is most appropriate for you during your annual wellness visit. The different tests include: colonoscopy (considered the best screening method), a fecal occult blood test, a fecal DNA test, and sigmoidoscopy. 
 
-A bone mass density test is recommended when a woman turns 65 to screen for osteoporosis. This test is only recommended one time, as a screening. Some providers will use this same test as a disease monitoring tool if you already have osteoporosis. -Breast cancer screenings are recommended every other year for women of normal risk, age 54-69. 
-Cervical cancer screenings for women over age 72 are only recommended with certain risk factors. Here is a list of your current Health Maintenance items (your personalized list of preventive services) with a due date: 
Health Maintenance Due Topic Date Due  
 DTaP/Tdap/Td  (1 - Tdap) 08/20/1974  Shingles Vaccine (1 of 2) 08/20/2003  Glaucoma Screening   08/20/2018  Bone Mineral Density   08/20/2018  Pneumococcal Vaccine (1 of 1 - PPSV23) 08/20/2018  Colon Cancer Stool Test  10/10/2019 [facility] Annual Well Visit  10/11/2019

## 2020-05-18 RX ORDER — SERTRALINE HYDROCHLORIDE 50 MG/1
TABLET, FILM COATED ORAL
Qty: 90 TAB | Refills: 1 | Status: SHIPPED | OUTPATIENT
Start: 2020-05-18 | End: 2020-06-18 | Stop reason: SDUPTHER

## 2020-05-18 RX ORDER — ESCITALOPRAM OXALATE 10 MG/1
10 TABLET ORAL DAILY
Qty: 30 TAB | Refills: 0 | Status: ON HOLD | OUTPATIENT
Start: 2020-05-18 | End: 2021-03-26

## 2020-06-17 RX ORDER — BUPROPION HYDROCHLORIDE 150 MG/1
150 TABLET ORAL
Qty: 30 TAB | Refills: 0 | Status: SHIPPED | OUTPATIENT
Start: 2020-06-17 | End: 2020-06-25

## 2020-06-18 RX ORDER — SERTRALINE HYDROCHLORIDE 50 MG/1
TABLET, FILM COATED ORAL
Qty: 90 TAB | Refills: 1 | Status: SHIPPED | OUTPATIENT
Start: 2020-06-18 | End: 2021-08-03 | Stop reason: SDUPTHER

## 2020-06-22 ENCOUNTER — TELEPHONE (OUTPATIENT)
Dept: NEUROLOGY | Age: 65
End: 2020-06-22

## 2020-06-23 NOTE — PROGRESS NOTES
Pt was unable to obtain COVID testing or transportation. Pt asked that procedure be rescheduled. Procedure to be done next Tuesday at 1300 per [facility], .

## 2020-06-23 NOTE — PROGRESS NOTES
Do you have a personal history of COVID-19 within the past 28 days? If Yes, What was the method of testing: clinical assumption or test result? Not tested    Have you had close contact with a known to be positive COVID-19 patient within the past 14 days? No    Are you a healthcare worker or ? No  If Yes, have you been exposed to COVID-19 without proper PPE? Do you live in a SNF, adult home or other institutional setting? If Yes, have they experienced a flood of COVID-19 positive patients?     In the past 2-14 days have you had any of the following symptoms    Cough No   New onset Shortness of breath or difficulty breathing  No    Or at least two of these symptoms:   Fever greater than 100 F No   Chills  No   Repeated shaking with chills  No   Muscle pain  No   Headache  No   Sore throat  No   New loss of taste or smell  NO   New onset diarrhea  No    Travel negative

## 2020-06-24 ENCOUNTER — HOSPITAL ENCOUNTER (OUTPATIENT)
Dept: INTERVENTIONAL RADIOLOGY/VASCULAR | Age: 65
Discharge: HOME | End: 2020-06-24
Attending: OBSTETRICS & GYNECOLOGY

## 2020-06-25 RX ORDER — BUPROPION HYDROCHLORIDE 150 MG/1
TABLET ORAL
Qty: 30 TAB | Refills: 0 | Status: ON HOLD | OUTPATIENT
Start: 2020-06-25 | End: 2021-03-26

## 2020-06-26 ENCOUNTER — OFFICE VISIT (OUTPATIENT)
Dept: PRIMARY CARE CLINIC | Age: 65
End: 2020-06-26

## 2020-06-26 VITALS — HEART RATE: 93 BPM | TEMPERATURE: 99 F | OXYGEN SATURATION: 98 %

## 2020-06-26 DIAGNOSIS — Z01.818 PRE-OP EXAM: Primary | ICD-10-CM

## 2020-06-26 DIAGNOSIS — Z11.59 SPECIAL SCREENING EXAMINATION FOR UNSPECIFIED VIRAL DISEASE: ICD-10-CM

## 2020-06-26 NOTE — PROGRESS NOTES
Patient is being seen at the [facility]. Please see scanned documentation as well for further information. S:  Ms. Gurinder Estrada presents for pre-op or pre-procedure testing for Covid 19. Patient has procedure or surgery by Dr. Inocente Pacheco scheduled for 6/30/20. Patient denies current Covid type symptoms. O:    Visit Vitals  Pulse 93   Temp 99 °F (37.2 °C)   SpO2 98%     Alert and oriented  No acute distress, no increased work of breathing  Normocephalic, atraumatic  Skin color normal  Calm and cooperative  Voice clear, conversant without shortness of breath    A/P:  Pre-op, Pre-procedure exam    Covid 19 testing performed  Patient understands she will be contacted if the results are positive. Follow up prn.

## 2020-06-29 RX ORDER — TIZANIDINE 4 MG/1
TABLET ORAL
Qty: 60 TAB | Refills: 0 | Status: SHIPPED | OUTPATIENT
Start: 2020-06-29 | End: 2020-07-29

## 2020-06-29 NOTE — PROGRESS NOTES
Pre call & IR screening form completed with patient regadring upcoming procedure. Patient had Covid testing done on Friday; results still pending. Have you been tested for COVID-19 in the past 7 days? Yes- Friday; results pending    Do you have a personal history of COVID-19 within the past 28 days? no  If Yes, What was the method of testing: clinical assumption or test result?no    Have you had close contact with a known to be positive COVID-19 patient within the past 14 days? no    Are you a healthcare worker or ? no  If Yes, have you been exposed to COVID-19 without proper PPE?no    Do you live in a SNF, adult home or other institutional setting? no  If Yes, have they experienced a flood of COVID-19 positive patients?no    In the past 2-14 days have you had any of the following symptoms : \\\none   Cough   New onset Shortness of breath or difficulty breathing    Or at least two of these symptoms:   Fever greater than 100 F   Chills   Repeated shaking with chills   Muscle pain   Headache   Sore throat   New loss of taste or smell   New onset diarrhea

## 2020-06-30 ENCOUNTER — HOSPITAL ENCOUNTER (OUTPATIENT)
Dept: INTERVENTIONAL RADIOLOGY/VASCULAR | Age: 65
Discharge: HOME | End: 2020-06-30
Attending: OBSTETRICS & GYNECOLOGY | Admitting: OBSTETRICS & GYNECOLOGY
Payer: MEDICARE

## 2020-06-30 VITALS
HEIGHT: 63 IN | HEART RATE: 87 BPM | OXYGEN SATURATION: 98 % | DIASTOLIC BLOOD PRESSURE: 87 MMHG | SYSTOLIC BLOOD PRESSURE: 142 MMHG | TEMPERATURE: 99.1 F | WEIGHT: 170 LBS | BODY MASS INDEX: 30.12 KG/M2 | RESPIRATION RATE: 18 BRPM

## 2020-06-30 DIAGNOSIS — R33.9 URINARY RETENTION: ICD-10-CM

## 2020-06-30 PROCEDURE — 77030040831 HC BAG URINE DRNG MDII -A

## 2020-06-30 PROCEDURE — C1729 CATH, DRAINAGE: HCPCS

## 2020-06-30 PROCEDURE — C1769 GUIDE WIRE: HCPCS

## 2020-06-30 PROCEDURE — 77002 NEEDLE LOCALIZATION BY XRAY: CPT

## 2020-06-30 PROCEDURE — C1892 INTRO/SHEATH,FIXED,PEEL-AWAY: HCPCS

## 2020-06-30 PROCEDURE — 77030012865 HC BG URIN LEG MDII -A

## 2020-06-30 PROCEDURE — C2627 CATH, SUPRAPUBIC/CYSTOSCOPIC: HCPCS

## 2020-06-30 PROCEDURE — 77030003526 HC NDL BLNT PERC MDT -B

## 2020-06-30 PROCEDURE — 74011250636 HC RX REV CODE- 250/636: Performed by: RADIOLOGY

## 2020-06-30 PROCEDURE — 74011636320 HC RX REV CODE- 636/320: Performed by: RADIOLOGY

## 2020-06-30 PROCEDURE — 74011000250 HC RX REV CODE- 250: Performed by: RADIOLOGY

## 2020-06-30 RX ORDER — LIDOCAINE HYDROCHLORIDE 20 MG/ML
0.3 INJECTION, SOLUTION INFILTRATION; PERINEURAL ONCE
Status: COMPLETED | OUTPATIENT
Start: 2020-06-30 | End: 2020-06-30

## 2020-06-30 RX ORDER — FENTANYL CITRATE 50 UG/ML
200 INJECTION, SOLUTION INTRAMUSCULAR; INTRAVENOUS
Status: DISCONTINUED | OUTPATIENT
Start: 2020-06-30 | End: 2020-06-30 | Stop reason: HOSPADM

## 2020-06-30 RX ORDER — SODIUM CHLORIDE 9 MG/ML
50 INJECTION, SOLUTION INTRAVENOUS CONTINUOUS
Status: DISCONTINUED | OUTPATIENT
Start: 2020-06-30 | End: 2020-06-30 | Stop reason: HOSPADM

## 2020-06-30 RX ORDER — MIDAZOLAM HYDROCHLORIDE 1 MG/ML
5 INJECTION, SOLUTION INTRAMUSCULAR; INTRAVENOUS
Status: DISCONTINUED | OUTPATIENT
Start: 2020-06-30 | End: 2020-06-30 | Stop reason: HOSPADM

## 2020-06-30 RX ORDER — CEFAZOLIN SODIUM/WATER 2 G/20 ML
2 SYRINGE (ML) INTRAVENOUS
Status: COMPLETED | OUTPATIENT
Start: 2020-06-30 | End: 2020-06-30

## 2020-06-30 RX ADMIN — FENTANYL CITRATE 50 MCG: 50 INJECTION INTRAMUSCULAR; INTRAVENOUS at 14:12

## 2020-06-30 RX ADMIN — CEFAZOLIN SODIUM 2 G: 100 INJECTION, POWDER, LYOPHILIZED, FOR SOLUTION INTRAVENOUS at 13:28

## 2020-06-30 RX ADMIN — MIDAZOLAM 1 MG: 1 INJECTION INTRAMUSCULAR; INTRAVENOUS at 14:12

## 2020-06-30 RX ADMIN — IOPAMIDOL 20 ML: 612 INJECTION, SOLUTION INTRAVENOUS at 14:43

## 2020-06-30 RX ADMIN — LIDOCAINE HYDROCHLORIDE 6 MG: 20 INJECTION, SOLUTION INFILTRATION; PERINEURAL at 14:13

## 2020-06-30 RX ADMIN — FENTANYL CITRATE 50 MCG: 50 INJECTION INTRAMUSCULAR; INTRAVENOUS at 14:21

## 2020-06-30 RX ADMIN — SODIUM CHLORIDE 50 ML/HR: 900 INJECTION, SOLUTION INTRAVENOUS at 14:00

## 2020-06-30 RX ADMIN — MIDAZOLAM 1 MG: 1 INJECTION INTRAMUSCULAR; INTRAVENOUS at 14:21

## 2020-06-30 NOTE — H&P
Interventional and Vascular Radiology History and Physical    Patient: Magda Cummins 75 y.o. female       Chief Complaint: No chief complaint on file. History of Present Illness: urinary retention     History:    Past Medical History:   Diagnosis Date    Depression     Menopause     Multiple sclerosis ([facility]     Neurological disorder     Multiple sclerosis, in remission     Other ill-defined conditions(799.89)     Mitral Value Prolapse     Family History   Problem Relation Age of Onset    Breast Cancer Mother         52's     Social History     Socioeconomic History    Marital status: SINGLE     Spouse name: Not on file    Number of children: Not on file    Years of education: Not on file    Highest education level: Not on file   Occupational History    Not on file   Social Needs    Financial resource strain: Not on file    Food insecurity     Worry: Never true     Inability: Never true   VSE EVAKUATORY ROSSII needs     Medical: No     Non-medical: No   Tobacco Use    Smoking status: Former Smoker     Last attempt to quit: 2016     Years since quittin.4    Smokeless tobacco: Never Used   Substance and Sexual Activity    Alcohol use:  Yes     Alcohol/week: 3.0 standard drinks     Types: 3 Shots of liquor per week     Comment: 3 x week    Drug use: Yes     Frequency: 1.0 times per week     Types: Marijuana     Comment: occasional marijuana    Sexual activity: Not Currently   Lifestyle    Physical activity     Days per week: Not on file     Minutes per session: Not on file    Stress: Not on file   Relationships    Social connections     Talks on phone: Not on file     Gets together: Not on file     Attends Hoahaoism service: Not on file     Active member of club or organization: Not on file     Attends meetings of clubs or organizations: Not on file     Relationship status: Not on file    Intimate partner violence     Fear of current or ex partner: Not on file     Emotionally abused: Not on file     Physically abused: Not on file     Forced sexual activity: Not on file   Other Topics Concern     Service Not Asked    Blood Transfusions Not Asked    Caffeine Concern Not Asked    Occupational Exposure Not Asked    Hobby Hazards Not Asked    Sleep Concern Not Asked    Stress Concern Not Asked    Weight Concern Not Asked    Special Diet Not Asked    Back Care Not Asked    Exercise Not Asked    Bike Helmet Not Asked   [address] Not Asked    Self-Exams Not Asked   Social History Narrative    Not on file       Allergies: No Known Allergies    Current Medications:  Current Facility-Administered Medications   Medication Dose Route Frequency    0.9% sodium chloride infusion  50 mL/hr IntraVENous CONTINUOUS    midazolam (VERSED) injection 5 mg  5 mg IntraVENous Rad Multiple    fentaNYL citrate (PF) injection 200 mcg  200 mcg IntraVENous Rad Multiple        Physical Exam:  Blood pressure 142/87, pulse 87, temperature 99.1 °F (37.3 °C), resp. rate 18, height 5' 3\" (1.6 m), weight 77.1 kg (170 lb), SpO2 98 %. LUNG: clear to auscultation bilaterally, HEART: regular rate and rhythm, S1, S2 normal, no murmur, click, rub or gallop      Alerts:    Hospital Problems  Date Reviewed: 5/15/2020    None          Laboratory:    No results for input(s): HGB, HCT, WBC, PLT, INR, BUN, CREA, K, CRCLT, HGBEXT, HCTEXT, PLTEXT, INREXT in the last 72 hours. No lab exists for component: PTT, PT      Plan of Care/Planned Procedure:  Risks, benefits, and alternatives reviewed with patient and she agrees to proceed with the procedure. Conscious sedation will be performed with IV fentanyl and versed.  Plan is for suprapubic catheter placement       Ankit Cross MD

## 2020-06-30 NOTE — PROGRESS NOTES
Pt had transportation company arranged for discharge. RN explained to pt that she would need a responsbile adult to discharge and go home with her per Angio procedure. Pt was able to call [facility] ([facility] and arrange ride. RN spoke with [place] and friend stated she would  and stay with patient. Provider notified.

## 2020-06-30 NOTE — PROGRESS NOTES
1515 pm- Patient taken to front of hospital via wheelchair for discharge by RADHA Ayala RN to meet her ride-Neisha Toy Marker for discharge.

## 2020-06-30 NOTE — PROGRESS NOTES
Pt arrives via hweelchair to angio department accompanied by none for insertin of suprapubic catheter procedure. All assessments completed and consent was reviewed. Education given was regarding procedure, conscious sedation, post-procedure care and  management/follow-up. Opportunity for questions was provided and all questions and concerns were addressed.

## 2020-07-02 LAB — SARS-COV-2, NAA: NOT DETECTED

## 2020-07-06 ENCOUNTER — OFFICE VISIT (OUTPATIENT)
Dept: NEUROLOGY | Age: 65
End: 2020-07-06

## 2020-07-06 VITALS
SYSTOLIC BLOOD PRESSURE: 130 MMHG | BODY MASS INDEX: 30.12 KG/M2 | OXYGEN SATURATION: 97 % | HEIGHT: 63 IN | DIASTOLIC BLOOD PRESSURE: 78 MMHG | TEMPERATURE: 98.1 F | HEART RATE: 88 BPM | WEIGHT: 170 LBS

## 2020-07-06 DIAGNOSIS — R53.1 WEAKNESS GENERALIZED: ICD-10-CM

## 2020-07-06 DIAGNOSIS — G35 MULTIPLE SCLEROSIS (HCC): ICD-10-CM

## 2020-07-06 DIAGNOSIS — E53.8 LOW SERUM VITAMIN B12: Primary | ICD-10-CM

## 2020-07-06 DIAGNOSIS — E55.9 VITAMIN D DEFICIENCY: ICD-10-CM

## 2020-07-06 NOTE — PROGRESS NOTES
Chief Complaint   Patient presents with    Multiple Sclerosis     patient states \"I would like to discuss getting on some medicine for my MS. \"       HPI    69-year-old woman following up. She has MS. Last battery of imaging last year without any progressive disease. We attempted to start Avonex but her insurance denied the medication. She has not been on any disease modifying treatment since I saw her last.  Medically she now has a suprapubic catheter in place. No urinary infections. She has been mostly homebound due to the pandemic. She is feeling generalized weakness with some depression but no suicidal ideation. ADLs are intact. Her right leg is paretic. BKGD:  Ms. Eulas Olszewski is a 69-year-old woman with a history of MS diagnosed in 1989 after suffering from left sided optic neuritis. She underwent testing to include MRI which confirmed the diagnosis. She was initially started on Avonex injections weekly and had recurrent episodes requiring steroid treatments. She stopped disease modifying drug treatment in 2005 and opted for a more natural alternative based approach. However, in the last few months she has noticed that her MS symptoms have flared and not recovering well. She has right leg numbness and weakness ongoing for 2 months. MS treatment history, Avonex, Rebif      Review of Systems   Constitutional: Positive for malaise/fatigue. Neurological: Positive for weakness. Psychiatric/Behavioral: Positive for depression. Negative for suicidal ideas. All other systems reviewed and are negative.       Past Medical History:   Diagnosis Date    Depression     Menopause     Multiple sclerosis ([facility] [address]     Neurological disorder     Multiple sclerosis, in remission 2011    Other ill-defined conditions(799.89)     Mitral Value Prolapse     Family History   Problem Relation Age of Onset    Breast Cancer Mother         52's     Social History     Socioeconomic History    Marital status: SINGLE Spouse name: Not on file    Number of children: Not on file    Years of education: Not on file    Highest education level: Not on file   Occupational History    Not on file   Social Needs    Financial resource strain: Not on file    Food insecurity     Worry: Never true     Inability: Never true    Transportation needs     Medical: No     Non-medical: No   Tobacco Use    Smoking status: Former Smoker     Last attempt to quit: 2016     Years since quittin.5    Smokeless tobacco: Never Used   Substance and Sexual Activity    Alcohol use:  Yes     Alcohol/week: 3.0 standard drinks     Types: 3 Shots of liquor per week     Comment: 3 x week    Drug use: Yes     Frequency: 1.0 times per week     Types: Marijuana     Comment: occasional marijuana    Sexual activity: Not Currently   Lifestyle    Physical activity     Days per week: Not on file     Minutes per session: Not on file    Stress: Not on file   Relationships    Social connections     Talks on phone: Not on file     Gets together: Not on file     Attends Nondenominational service: Not on file     Active member of club or organization: Not on file     Attends meetings of clubs or organizations: Not on file     Relationship status: Not on file    Intimate partner violence     Fear of current or ex partner: Not on file     Emotionally abused: Not on file     Physically abused: Not on file     Forced sexual activity: Not on file   Other Topics Concern     Service Not Asked    Blood Transfusions Not Asked    Caffeine Concern Not Asked    Occupational Exposure Not Asked   Geoffery Fillers Hazards Not Asked    Sleep Concern Not Asked    Stress Concern Not Asked    Weight Concern Not Asked    Special Diet Not Asked    Back Care Not Asked    Exercise Not Asked    Bike Helmet Not Asked    [address] Not Asked    Self-Exams Not Asked   Social History Narrative    Not on file     No Known Allergies      Current Outpatient Medications   Medication Sig  tiZANidine (ZANAFLEX) 4 mg tablet TAKE ONE TABLET BY MOUTH EVERY 8 HOURS AS NEEDED    sertraline (ZOLOFT) 50 mg tablet TAKE ONE TABLET BY MOUTH DAILY    bisacodyl (DULCOLAX) 10 mg suppository Insert 10 mg into rectum daily as needed.  buPROPion XL (WELLBUTRIN XL) 150 mg tablet TAKE ONE TABLET BY MOUTH EVERY MORNING    escitalopram oxalate (LEXAPRO) 10 mg tablet Take 1 Tab by mouth daily.  varicella-zoster recombinant, PF, (Shingrix, PF,) 50 mcg/0.5 mL susr injection 0.5mL by IntraMUSCular route once now and then repeat in 2-6 months    oxybutynin (DITROPAN) 5 mg tablet Take 1 Tab by mouth three (3) times daily. No current facility-administered medications for this visit. Neurologic Exam     Mental Status   WD/WN adult in NAD, normal grooming  VSS  A&O x 3    PERRL, nonicteric  Face is symmetric, tongue midline  Speech is fluent and clear  No limb ataxia. No abnl movements. Moving all extemities spontaneously and symmetric with the exception of right leg  Bilateral deltoids 5/5 with good effort  Deferred gaitwheelchair    CVS RRR  Lungs nonlabored  Skin is warm and dry         Visit Vitals  /78 (BP 1 Location: Left arm, BP Patient Position: Sitting)   Pulse 88   Temp 98.1 °F (36.7 °C) (Core)   Ht 5' 3\" (1.6 m)   Wt 77.1 kg (170 lb)   SpO2 97%   BMI 30.11 kg/m²       Assessment and Plan   Diagnoses and all orders for this visit:    1. Low serum vitamin B12  -     VITAMIN B12 & FOLATE    2. Vitamin D deficiency  -     VITAMIN D, 25 HYDROXY; Future    3.  Multiple sclerosis ([facility] [address]  -     MRI BRAIN WO CONT; Future  -     MRI CERV SPINE WO CONT; Future  -     CBC W/O DIFF  -     METABOLIC PANEL, COMPREHENSIVE  -     QUANTIFERON-TB GOLD PLUS  -     VITAMIN D, 25 HYDROXY; Future  -     VITAMIN B12 & FOLATE  -     TSH AND FREE T4  -     REFERRAL TO PHYSICAL THERAPY    4. Weakness generalized  -     MRI BRAIN WO CONT; Future  -     MRI CERV SPINE WO CONT; Future  -     CBC W/O DIFF  - METABOLIC PANEL, COMPREHENSIVE  -     QUANTIFERON-TB GOLD PLUS  -     VITAMIN D, 25 HYDROXY; Future  -     VITAMIN B12 & FOLATE  -     TSH AND FREE T4  -     REFERRAL TO PHYSICAL THERAPY      67-year-old woman with multiple sclerosis. It is time we complete interval imaging to assess for any progression of disease given that she has been off of any disease modifying treatment. Depending on those results we should consider reinitiation of treatment in this case either Avonex or Aubagio. I would be hesitant with some other agents given the current pandemic and the effect on possibly lowering immunity protection. We talked about this. I am also going to have her see outpatient PT OT for evaluation for the weakness which could be due to overall debility. B12 in the past was low recheck new B12 level and also vitamin D given the MS. Need to check TB status if we decide to pursue Aubagio so I have placed those orders as well as LFTs. I would like her to call me after she completes the MRI so we can plan on neck steps. Otherwise I will see her in 6 months. I reviewed and decided to continue the current medications. This clinical note was dictated with an electronic dictation software that can make unintentional errors. If there are any questions, please contact me directly for clarification.       [address] [facility], [facility] [address]  Diplomate CHANTALN

## 2020-07-06 NOTE — PROGRESS NOTES
Chief Complaint   Patient presents with    Multiple Sclerosis     patient states \"I would like to discuss getting on some medicine for my MS. \"     Visit Vitals  /78 (BP 1 Location: Left arm, BP Patient Position: Sitting)   Pulse 88   Temp 98.1 °F (36.7 °C) (Core)   Ht 5' 3\" (1.6 m)   Wt 77.1 kg (170 lb)   SpO2 97%   BMI 30.11 kg/m²

## 2020-07-06 NOTE — Clinical Note
We will be attempting to order either Avonex or Aubagio. Last year Avonex was denied.   She was on Rebif in the past.

## 2020-07-07 ENCOUNTER — DOCUMENTATION ONLY (OUTPATIENT)
Dept: NEUROLOGY | Age: 65
End: 2020-07-07

## 2020-07-07 RX ORDER — ERGOCALCIFEROL 1.25 MG/1
50000 CAPSULE ORAL
Qty: 7 CAP | Refills: 0 | Status: SHIPPED | OUTPATIENT
Start: 2020-07-07 | End: 2020-08-19

## 2020-07-07 NOTE — PROGRESS NOTES
Faxed referral to [facility] and received confirmation.
I have reviewed and confirmed nurses' notes for patient's medications, allergies, medical history, and surgical history.

## 2020-07-07 NOTE — PROGRESS NOTES
Vitamin D is very low, 9.9. Start high-dose once a week for 7 weeks and then after she completes that medication please continue daily over-the-counter 2000 units a day.

## 2020-07-09 ENCOUNTER — TELEPHONE (OUTPATIENT)
Dept: NEUROLOGY | Age: 65
End: 2020-07-09

## 2020-07-09 LAB
25(OH)D3+25(OH)D2 SERPL-MCNC: 9.9 NG/ML (ref 30–100)
ALBUMIN SERPL-MCNC: 4.4 G/DL (ref 3.8–4.8)
ALBUMIN/GLOB SERPL: 1.5 {RATIO} (ref 1.2–2.2)
ALP SERPL-CCNC: 72 IU/L (ref 39–117)
ALT SERPL-CCNC: 15 IU/L (ref 0–32)
AST SERPL-CCNC: 15 IU/L (ref 0–40)
BILIRUB SERPL-MCNC: 0.3 MG/DL (ref 0–1.2)
BUN SERPL-MCNC: 7 MG/DL (ref 8–27)
BUN/CREAT SERPL: 11 (ref 12–28)
CALCIUM SERPL-MCNC: 9.9 MG/DL (ref 8.7–10.3)
CHLORIDE SERPL-SCNC: 105 MMOL/L (ref 96–106)
CO2 SERPL-SCNC: 23 MMOL/L (ref 20–29)
CREAT SERPL-MCNC: 0.64 MG/DL (ref 0.57–1)
ERYTHROCYTE [DISTWIDTH] IN BLOOD BY AUTOMATED COUNT: 13 % (ref 11.7–15.4)
FOLATE SERPL-MCNC: 11.9 NG/ML
GAMMA INTERFERON BACKGROUND BLD IA-ACNC: 0.05 IU/ML
GLOBULIN SER CALC-MCNC: 3 G/DL (ref 1.5–4.5)
GLUCOSE SERPL-MCNC: 78 MG/DL (ref 65–99)
HCT VFR BLD AUTO: 43.1 % (ref 34–46.6)
HGB BLD-MCNC: 13.7 G/DL (ref 11.1–15.9)
M TB IFN-G BLD-IMP: NEGATIVE
M TB IFN-G CD4+ BCKGRND COR BLD-ACNC: 0.05 IU/ML
MCH RBC QN AUTO: 26.4 PG (ref 26.6–33)
MCHC RBC AUTO-ENTMCNC: 31.8 G/DL (ref 31.5–35.7)
MCV RBC AUTO: 83 FL (ref 79–97)
MITOGEN IGNF BLD-ACNC: 7.71 IU/ML
PLATELET # BLD AUTO: 377 X10E3/UL (ref 150–450)
POTASSIUM SERPL-SCNC: 4.3 MMOL/L (ref 3.5–5.2)
PROT SERPL-MCNC: 7.4 G/DL (ref 6–8.5)
QUANTIFERON INCUBATION, QF1T: NORMAL
QUANTIFERON TB2 AG: 0.04 IU/ML
RBC # BLD AUTO: 5.19 X10E6/UL (ref 3.77–5.28)
SERVICE CMNT-IMP: NORMAL
SODIUM SERPL-SCNC: 144 MMOL/L (ref 134–144)
T4 FREE SERPL-MCNC: 1.11 NG/DL (ref 0.82–1.77)
TSH SERPL DL<=0.005 MIU/L-ACNC: 3.98 UIU/ML (ref 0.45–4.5)
VIT B12 SERPL-MCNC: 469 PG/ML (ref 232–1245)
WBC # BLD AUTO: 9.8 X10E3/UL (ref 3.4–10.8)

## 2020-07-15 NOTE — TELEPHONE ENCOUNTER
Re: Avonex approved    Approval rec'd from [address] [place] [address] via [address] [place] [id number]  Effective 01/01/20-12/31/20    Forwarding to nurse.

## 2020-07-20 ENCOUNTER — HOSPITAL ENCOUNTER (OUTPATIENT)
Dept: MRI IMAGING | Age: 65
Discharge: HOME | End: 2020-07-20
Attending: PSYCHIATRY & NEUROLOGY
Payer: MEDICARE

## 2020-07-20 DIAGNOSIS — G35 MULTIPLE SCLEROSIS (HCC): ICD-10-CM

## 2020-07-20 DIAGNOSIS — R53.1 WEAKNESS GENERALIZED: ICD-10-CM

## 2020-07-20 PROCEDURE — 72141 MRI NECK SPINE W/O DYE: CPT

## 2020-07-20 PROCEDURE — 70551 MRI BRAIN STEM W/O DYE: CPT

## 2020-07-27 ENCOUNTER — TELEPHONE (OUTPATIENT)
Dept: NEUROLOGY | Age: 65
End: 2020-07-27

## 2020-07-28 NOTE — TELEPHONE ENCOUNTER
Called the patient she states her insurance approved the medication avenox/pen and she is asking can the prescription be sent to her pharmacy now.

## 2020-07-29 RX ORDER — TIZANIDINE 4 MG/1
TABLET ORAL
Qty: 60 TAB | Refills: 0 | Status: SHIPPED | OUTPATIENT
Start: 2020-07-29 | End: 2020-08-26

## 2020-07-31 ENCOUNTER — TELEPHONE (OUTPATIENT)
Dept: NEUROLOGY | Age: 65
End: 2020-07-31

## 2020-07-31 NOTE — TELEPHONE ENCOUNTER
Message from hello below: \"Pls cl: Her insurance approved a medication that the Dr Richard Stinson needs a walk through how to inject said rx. \"

## 2020-08-04 ENCOUNTER — TELEPHONE (OUTPATIENT)
Dept: NEUROLOGY | Facility: CLINIC | Age: 65
End: 2020-08-04

## 2020-08-04 NOTE — TELEPHONE ENCOUNTER
Pt stated that [address] said that an Avonex start form needs to be faxed to:    [phone]    Please call.

## 2020-08-04 NOTE — TELEPHONE ENCOUNTER
Spoke with patient. Provided contact information for [facility] to have her medication shipped and instructions on how to give herself medication.

## 2020-08-07 ENCOUNTER — HOSPITAL ENCOUNTER (OUTPATIENT)
Dept: MAMMOGRAPHY | Age: 65
Discharge: HOME | End: 2020-08-07
Attending: FAMILY MEDICINE
Payer: MEDICARE

## 2020-08-07 DIAGNOSIS — Z78.0 POSTMENOPAUSAL STATE: ICD-10-CM

## 2020-08-07 PROCEDURE — 77080 DXA BONE DENSITY AXIAL: CPT

## 2020-08-26 RX ORDER — TIZANIDINE 4 MG/1
TABLET ORAL
Qty: 60 TAB | Refills: 0 | Status: SHIPPED | OUTPATIENT
Start: 2020-08-26 | End: 2020-09-28

## 2020-08-31 ENCOUNTER — TELEPHONE (OUTPATIENT)
Dept: NEUROLOGY | Facility: CLINIC | Age: 65
End: 2020-08-31

## 2020-09-01 ENCOUNTER — TELEPHONE (OUTPATIENT)
Dept: NEUROLOGY | Facility: CLINIC | Age: 65
End: 2020-09-01

## 2020-09-18 ENCOUNTER — TELEPHONE (OUTPATIENT)
Dept: NEUROLOGY | Facility: CLINIC | Age: 65
End: 2020-09-18

## 2020-09-18 NOTE — TELEPHONE ENCOUNTER
Called Madelene Canavan back, she requested verbal order to do in home training per patient's request, Dr. Jade Wallace states ok to do in home training. Also nurse calling stating patient received one injector, but to titrate, \"titration pack would need to be sent in,\" Will fill out form and put on Dr. Yadira Anthony desk for Monday.

## 2020-09-22 ENCOUNTER — TELEPHONE (OUTPATIENT)
Dept: NEUROLOGY | Facility: CLINIC | Age: 65
End: 2020-09-22

## 2020-09-22 NOTE — TELEPHONE ENCOUNTER
Calling back to let them know that a script for pre filled syringes for titration for avonex, needs to sent to wherever original script was sent.  If you have any questions can call if you have any questions

## 2020-09-23 ENCOUNTER — TELEPHONE (OUTPATIENT)
Dept: NEUROLOGY | Facility: CLINIC | Age: 65
End: 2020-09-23

## 2020-09-23 NOTE — TELEPHONE ENCOUNTER
Connie August with [facility] calling, needs an order for pt,ot; office notes,med list and demographic sheet faxed to [phone].

## 2020-09-24 NOTE — TELEPHONE ENCOUNTER
Augustine calling back to follow up on previous request, if you have any questions can call [phone].

## 2020-09-25 ENCOUNTER — TELEPHONE (OUTPATIENT)
Dept: NEUROLOGY | Facility: CLINIC | Age: 65
End: 2020-09-25

## 2020-09-25 NOTE — TELEPHONE ENCOUNTER
Sapna Phipps,  w/[facility] is calling regarding the referral to [address] [facility]. There is no doctor signature on the form and a couple of other items need to be addressed before they are able to contact the patient.

## 2020-09-28 RX ORDER — TIZANIDINE 4 MG/1
TABLET ORAL
Qty: 60 TAB | Refills: 0 | Status: SHIPPED | OUTPATIENT
Start: 2020-09-28 | End: 2020-10-28

## 2020-09-29 ENCOUNTER — TELEPHONE (OUTPATIENT)
Dept: NEUROLOGY | Facility: CLINIC | Age: 65
End: 2020-09-29

## 2020-09-29 NOTE — TELEPHONE ENCOUNTER
Malo Madison calling, pt was evaluated for PT. Wanted to confirm Dr. Tello Blanton will sign home health orders. Pt reported she fell last night transferring from toilet to wheelchair-no apparent injury. Also requesting verbal order for OT to help in bathroom. Please call Yesi Wayne back.

## 2020-09-29 NOTE — TELEPHONE ENCOUNTER
Shea Adkins with [address] calling pt needs prefilled syringe for Avonex sent to pharmacy.     [facility]  [phone] (Fax)  [phone] (Phone)

## 2020-09-29 NOTE — TELEPHONE ENCOUNTER
Marla start , devices that go on the syringe to help titrate. Please call so this can get straighten.

## 2020-10-01 ENCOUNTER — TELEPHONE (OUTPATIENT)
Dept: NEUROLOGY | Facility: CLINIC | Age: 65
End: 2020-10-01

## 2020-10-01 NOTE — TELEPHONE ENCOUNTER
Amber Vincent calling to see if presription for Avonex needs to be sent to [facility].    [phone]  [phone]

## 2020-10-01 NOTE — TELEPHONE ENCOUNTER
So the Avonex titration does not exist in our EMR pharmacy list.  I would recommend the rep send us the preprinted paperwork for starting a prescription which usually has a section for checking off titration kit versus refill.

## 2020-10-01 NOTE — TELEPHONE ENCOUNTER
Avonex rep calling back, routed to the pharmacist, per Dr. Nikki Ellis, caled in the Avonex pro  titration kit.

## 2020-10-07 ENCOUNTER — TELEPHONE (OUTPATIENT)
Dept: NEUROLOGY | Facility: CLINIC | Age: 65
End: 2020-10-07

## 2020-10-08 ENCOUNTER — TELEPHONE (OUTPATIENT)
Dept: NEUROLOGY | Facility: CLINIC | Age: 65
End: 2020-10-08

## 2020-10-08 NOTE — TELEPHONE ENCOUNTER
----- Message from Shaina Forrester sent at 10/8/2020 10:59 AM EDT -----  Regarding: Dr. Chao Yao first and last name: Yang Lopez with [facility]   Reason for call: Patient refused therapy  Callback required yes/no and why: N  Best contact number(s): [phone]  Details to clarify the request: Kwan Alejandra was calling to notify Dr. Emmanuelle Britton that the patient refused therapy today, 10/08/2020, due to her being seen in the ER yesterday for difficulty breathing and didn't feel well enough to proceed with the physical therapy visit today. Kwan Alejandra does not need a call back unless Dr. Emmanuelle Britton has any questions or concerns.

## 2020-10-08 NOTE — TELEPHONE ENCOUNTER
I can even order this in the EMR. All it gives me our options of 30 mcg and a titration kit there are 3 different dosing amounts. I am not sure who the rep is. We need to find out so they can fix this.

## 2020-10-09 NOTE — TELEPHONE ENCOUNTER
Patient called, verified, clarified with the patient that \" I only wanted to hold on PT yesterday because I went to the ER, but I did not mean for good. \" Patient also stated \"The Avonex people called saying they need Dr. Camilo Ferris to send the medicine in, I thought that had already happened, but they said they were going to try and reach her again. \"

## 2020-10-13 ENCOUNTER — TELEPHONE (OUTPATIENT)
Dept: NEUROLOGY | Facility: CLINIC | Age: 65
End: 2020-10-13

## 2020-10-13 NOTE — TELEPHONE ENCOUNTER
Called and spoke with Josi Stephens who stated he will fax over an order form in the next day or so.  Fax number provided

## 2020-10-13 NOTE — TELEPHONE ENCOUNTER
Julio Rueda physical therapists calling to see if he can get a doctors order for trapeze bar for 's hospital bed.     If order is approved can [facility] be [facility]   [phone]

## 2020-10-23 ENCOUNTER — TELEPHONE (OUTPATIENT)
Dept: NEUROLOGY | Facility: CLINIC | Age: 65
End: 2020-10-23

## 2020-10-23 NOTE — TELEPHONE ENCOUNTER
Rupesh calling needing prefilled syringe for Avonex called into pharmacy.       Jone Morse  [phone]

## 2020-10-27 ENCOUNTER — TELEPHONE (OUTPATIENT)
Dept: NEUROLOGY | Facility: CLINIC | Age: 65
End: 2020-10-27

## 2020-10-27 NOTE — TELEPHONE ENCOUNTER
I advised Laygregg Harley, with [facility], to continue with patient's OT, per Dr. Reina Cons.

## 2020-10-27 NOTE — TELEPHONE ENCOUNTER
Original order that has been faxed THREE previous times and called in refaxed with the pre filled syringes marked as previous and highlighted. Confirmation received.

## 2020-10-27 NOTE — TELEPHONE ENCOUNTER
Nonda Captain w/ [facility] calling to get verbal order to continue occupational therapy. Pt is making progress but still needs more.

## 2020-10-28 RX ORDER — TIZANIDINE 4 MG/1
TABLET ORAL
Qty: 60 TAB | Refills: 0 | Status: SHIPPED | OUTPATIENT
Start: 2020-10-28 | End: 2020-10-30

## 2020-10-29 ENCOUNTER — TELEPHONE (OUTPATIENT)
Dept: NEUROLOGY | Facility: CLINIC | Age: 65
End: 2020-10-29

## 2020-10-29 NOTE — TELEPHONE ENCOUNTER
Thalia Madden w/ [facility] calling to find out if order was received for trapeze bar to help pt out of bed.  Please call

## 2020-10-30 RX ORDER — TIZANIDINE 4 MG/1
TABLET ORAL
Qty: 60 TAB | Refills: 0 | Status: SHIPPED | OUTPATIENT
Start: 2020-10-30 | End: 2020-12-02

## 2020-11-05 ENCOUNTER — TELEPHONE (OUTPATIENT)
Dept: NEUROLOGY | Age: 65
End: 2020-11-05

## 2020-11-05 NOTE — TELEPHONE ENCOUNTER
Physical therapy calling to inform doctor that pt fell this morning at home with no apparent injury. Also, faxed orders for trapeze bar for the hospital bed, that needed to be sent to [facility] hasn't received the orders yet.

## 2020-12-02 RX ORDER — TIZANIDINE 4 MG/1
TABLET ORAL
Qty: 60 TAB | Refills: 0 | Status: SHIPPED | OUTPATIENT
Start: 2020-12-02 | End: 2021-01-11

## 2020-12-15 ENCOUNTER — TELEPHONE (OUTPATIENT)
Dept: NEUROLOGY | Age: 65
End: 2020-12-15

## 2020-12-15 NOTE — TELEPHONE ENCOUNTER
----- Message from Gregg Pratt sent at 12/15/2020 12:39 PM EST -----  Regarding: Dr Foster Mast first and last name:Francis George Physical Therapist  with [facility]      Reason for call:wants to let the Dr Jairo Lamar or the nurse  know that pt fell from transfer from her bed to her wheel chair last Friday 12/11/2020 , and there are no apparent injuries  , just maybe little soreness on her (R) knee      Callback required yes/no and why:no just  FYI  unless there are further questions       Best contact number(s):[phone]      Details to clarify the request:      Gregg Pratt

## 2020-12-24 NOTE — TELEPHONE ENCOUNTER
Lucy Ram with [facility] calling to let Dr Mehran Jo know they were not able to see pt this week, due to family in town, will see her next week for physical therapy.

## 2021-01-11 RX ORDER — TIZANIDINE 4 MG/1
TABLET ORAL
Qty: 60 TAB | Refills: 0 | Status: SHIPPED | OUTPATIENT
Start: 2021-01-11 | End: 2021-01-12

## 2021-01-12 ENCOUNTER — TELEPHONE (OUTPATIENT)
Dept: NEUROLOGY | Age: 66
End: 2021-01-12

## 2021-01-12 RX ORDER — TIZANIDINE 4 MG/1
TABLET ORAL
Qty: 60 TAB | Refills: 0 | Status: SHIPPED | OUTPATIENT
Start: 2021-01-12 | End: 2021-03-15

## 2021-01-12 NOTE — TELEPHONE ENCOUNTER
----- Message from Itzel Quiroz sent at 1/12/2021 12:17 PM EST -----  Regarding: Dr. Cyrus Stein Message/Vendor Calls    Caller's first and last name: Jerry Workman- [address] [place] [address]        Reason for call: Update on patient , Order request      Callback required yes/no and why: Yes. To advise      Best contact number(s): [phone]      Details to clarify the request: PT is reporting patient had a fall in the bathroom on 1/12/21 . Aide had to lower patient to the ground because patient t was not ab;e to transfer from bath bench to the wheelchair .  PT is requesting  a verbal order for one more PT visit to recert the patient      Karuna Quiroz

## 2021-01-12 NOTE — TELEPHONE ENCOUNTER
Called and left a VM for Olcott Speaker with [facility], advising that Dr. Roseline Reynoso approved additional PT orders.

## 2021-01-14 ENCOUNTER — TELEPHONE (OUTPATIENT)
Dept: NEUROLOGY | Age: 66
End: 2021-01-14

## 2021-01-14 NOTE — TELEPHONE ENCOUNTER
----- Message from Edward Haney sent at 1/14/2021  8:41 AM EST -----  Regarding: DOLORES/TELEPHONE  Contact: [phone]  General Message/Vendor Calls    Caller's first and last name: Feliz Ortega.      Reason for call: Advise of clinical question that need to be answer for prior auth      Callback required yes/no and why: Yes      Best contact number(s): [phone] ext [phone]      Details to clarify the request: Ny Morelos from [address] [place] [address] calling to advise that there are clinical questions that will need to answer for the prior auth for Avonex.       Edward Haney

## 2021-01-14 NOTE — TELEPHONE ENCOUNTER
Re: Avonex     Returned phone call to [facility], Cachorro Melida unavailable, routed to another agent. Provided with CMM Key: [id number] to finish PA request for patient. Status is pending.

## 2021-01-19 NOTE — TELEPHONE ENCOUNTER
Re: Avonex     Determination rec'd from [facility] [employer]. Stated that 1 pen for 28 days does not require authorization but 3 pens per 28 days has been denied. Requesting review, patient would like a 90 days supply (3 dose packs for 84 days- each dose pack contains 4 pens and patient takes 1 pen per week). Status is pending.

## 2021-01-21 NOTE — TELEPHONE ENCOUNTER
Re: Avoenx     [employer] has denied request for a 90 day supply. Per plan, certain drugs are limited to a recurring 30-day supply. Denial letter scanned into chart.

## 2021-01-22 ENCOUNTER — TELEPHONE (OUTPATIENT)
Dept: NEUROLOGY | Age: 66
End: 2021-01-22

## 2021-01-27 ENCOUNTER — TELEPHONE (OUTPATIENT)
Dept: NEUROLOGY | Age: 66
End: 2021-01-27

## 2021-01-27 NOTE — TELEPHONE ENCOUNTER
Okay for OT order.  What is the issue with the injection?  I believe the company for the injection has nursing available?

## 2021-01-27 NOTE — TELEPHONE ENCOUNTER
Marylene Spice with [facility] calling to get order for ot and nursing Pt having difficulty getting into tub, also verbalizing that she is  unable to give MS injection weekly. Please call.

## 2021-01-27 NOTE — TELEPHONE ENCOUNTER
Called and LVM for Isreal Lee from [facility] to advise the new orders have been faxed over and in regards to [address] injection nurse administration being available.  Requested a call back for further clarification

## 2021-01-29 ENCOUNTER — TELEPHONE (OUTPATIENT)
Dept: NEUROLOGY | Age: 66
End: 2021-01-29

## 2021-01-29 NOTE — TELEPHONE ENCOUNTER
Debra Nance, a PT with [facility] is calling to state the patient has cancelled her appt today, her caregiver hasn't showed up today. They have some other concerns they would like to discuss as well.

## 2021-02-01 NOTE — TELEPHONE ENCOUNTER
Uriel Rice, PT call returned, she is requesting to speak with Dr. Aashish Rivera, will arrange phone call, forwarded to Dr. Aashish Rivera also

## 2021-02-02 ENCOUNTER — TELEPHONE (OUTPATIENT)
Dept: NEUROLOGY | Age: 66
End: 2021-02-02

## 2021-02-02 NOTE — TELEPHONE ENCOUNTER
Okay I talked with her. If you can, please look at her face sheet as there is an emergency contact listed. We need to track down this patient's Medicaid . She really does need more assistance and I think she is high risk to deteriorate in the home and probably needs placement. Since she has Medicaid they should be able to help at least get that ball rolling. According to Bridgette Freya is alone most of the time. She needs assistance with transfers and hygiene. She is essentially nonambulatory and has no in-home care consistently and limited social resources. She has reached maximum benefit it sounds like with home therapies and is not progressing any further so she will be discharged from their care next week.

## 2021-02-02 NOTE — TELEPHONE ENCOUNTER
If she wants to change medication we really should have her come in for a visit so we can document our plans especially for insurance purposes and I would like to sit down with her face-to-face and talk about the options. If injection is difficult for her we might need to switch to a daily oral medication or consider infusion treatments which would be either monthly or once every 6 months. Can you get her in this week? Next week is also possible.

## 2021-02-02 NOTE — TELEPHONE ENCOUNTER
----- Message from Espinoza Stack sent at 2/2/2021 11:39 AM EST -----  Regarding: DOLORES/TELEPHONE  Contact: [phone]  General Message/Vendor Calls    Caller's first and last name: Antonio Jimenez      Reason for call: Discuss changing medication      Callback required yes/no and why: Yes      Best contact number(s):[phone]      Details to clarify the request: Patient requesting a callback to discuss changing MS medication due to having a problem using the Avonex Pen.       Espinoza Stack

## 2021-02-04 NOTE — TELEPHONE ENCOUNTER
Called and left a message for Adrianna Ayala listed as Emergency contact for the patient to call the office back.

## 2021-02-08 NOTE — TELEPHONE ENCOUNTER
Called and LVM x 2 for emergency contact listed, no call back as of yet, will continue to try and reach out.

## 2021-02-16 NOTE — TELEPHONE ENCOUNTER
2nd message left for the patient, number listed for emergency contact now states disconnected, will forward to Dr. Avila Contreras to advise.

## 2021-02-22 ENCOUNTER — TELEPHONE (OUTPATIENT)
Dept: NEUROLOGY | Age: 66
End: 2021-02-22

## 2021-02-25 NOTE — TELEPHONE ENCOUNTER
Okay, I would like Adult Protective Services notified that we have a patient who is unable to care for herself independently and has no support. If you have time, please do your best to contact her [employer] . We will do our best to help her get what she needs but we have our limitations. I spoke with David Shaffer.

## 2021-02-25 NOTE — TELEPHONE ENCOUNTER
Logan Newton calling stating she released pt from physical therapy but caregiver called her frantic yesterday because when she got there pt was on floor in bathroom, 911 was called, pt refused to go to ER. Stated they have a true problem with pt and something needs to be done. Routing to [facility] said she spoke to Dr Rufina Savage a few weeks ago about pt.

## 2021-02-26 NOTE — TELEPHONE ENCOUNTER
[address] [place] [address] calling back to advise that patient lives in [place]. [address] contact , spoke with Jacinto Urrutia , provided her with all patient details as well as phone number for PT Logan Newton who has evaluated the patient most recently. I also advised that the patient had a caregiver and provided the name but stated we did not have a phone number on file. I was told that the intake details would be forwarded for review to decide whether a case needed to be opened or not.

## 2021-02-26 NOTE — TELEPHONE ENCOUNTER
I have left 2 messages for [employer] at [phone]. The only option was to leave a voice message.  Will re attempt to call again

## 2021-03-01 NOTE — TELEPHONE ENCOUNTER
Okay, we are going to do our best to get her the services she needs. Unfortunately government logistics will make this challenging.

## 2021-03-05 ENCOUNTER — TELEPHONE (OUTPATIENT)
Dept: NEUROLOGY | Age: 66
End: 2021-03-05

## 2021-03-05 NOTE — TELEPHONE ENCOUNTER
----- Message from [facility] sent at 3/5/2021 10:50 AM EST -----  Regarding: Dr. José Pulido telephone  General Message/Vendor Calls    Caller's first and last name: Tres Maxwell, [employer]       Reason for call: patient concerns      Callback required yes/no and why: yes       Best contact number(s):[phone]         Details to clarify the request:She stated she would like to be contacted by the nurse or Dr. Manny Martinez as patient is having issues with her west catheter and having other medical concerns she would like  to discuss.        [address] [employer]

## 2021-03-08 ENCOUNTER — TELEPHONE (OUTPATIENT)
Dept: NEUROLOGY | Age: 66
End: 2021-03-08

## 2021-03-08 NOTE — TELEPHONE ENCOUNTER
Returned a call for Tres Maxwell, was told by staff she is out of the office until 03/09/2021, will call back on 03/09/2021 to discuss concerns regarding the patient.

## 2021-03-08 NOTE — TELEPHONE ENCOUNTER
Patient's caregiver calling because patient is no longer to get out. She would like to know if the appt on 3/26 can be virtual.  Also, do you know of a way for her to get her Covid vaccine since she is homebound?

## 2021-03-10 NOTE — TELEPHONE ENCOUNTER
I would like the caregiver to bring her to her appointment if possible. From what I recall she does not have access to a virtual platform. She will have to check with her primary care about getting the vaccine. Our offices do not have information about scheduling at this time.

## 2021-03-11 NOTE — TELEPHONE ENCOUNTER
Called and left a detailed voice message for the patient's caregiver [place] advising that Dr. Ryan Whalen prefers to see the patient in person and also recommends the patient see her PCP in person as well.

## 2021-03-15 RX ORDER — TIZANIDINE 4 MG/1
TABLET ORAL
Qty: 60 TAB | Refills: 0 | Status: SHIPPED | OUTPATIENT
Start: 2021-03-15 | End: 2021-06-21 | Stop reason: SDUPTHER

## 2021-03-17 ENCOUNTER — VIRTUAL VISIT (OUTPATIENT)
Dept: INTERNAL MEDICINE CLINIC | Age: 66
End: 2021-03-17
Payer: MEDICARE

## 2021-03-17 DIAGNOSIS — G35 MULTIPLE SCLEROSIS EXACERBATION (HCC): Primary | ICD-10-CM

## 2021-03-17 DIAGNOSIS — N39.41 URGE INCONTINENCE: ICD-10-CM

## 2021-03-17 DIAGNOSIS — R29.898 WEAKNESS OF EXTREMITY: ICD-10-CM

## 2021-03-17 DIAGNOSIS — R33.9 INCOMPLETE BLADDER EMPTYING: ICD-10-CM

## 2021-03-17 DIAGNOSIS — Z46.6 CATHETER (URINE) CHANGE REQUIRED: ICD-10-CM

## 2021-03-17 DIAGNOSIS — L60.2 HYPERTROPHIC TOENAIL: ICD-10-CM

## 2021-03-17 PROCEDURE — G8536 NO DOC ELDER MAL SCRN: HCPCS | Performed by: FAMILY MEDICINE

## 2021-03-17 PROCEDURE — 3017F COLORECTAL CA SCREEN DOC REV: CPT | Performed by: FAMILY MEDICINE

## 2021-03-17 PROCEDURE — 3288F FALL RISK ASSESSMENT DOCD: CPT | Performed by: FAMILY MEDICINE

## 2021-03-17 PROCEDURE — G8432 DEP SCR NOT DOC, RNG: HCPCS | Performed by: FAMILY MEDICINE

## 2021-03-17 PROCEDURE — G8428 CUR MEDS NOT DOCUMENT: HCPCS | Performed by: FAMILY MEDICINE

## 2021-03-17 PROCEDURE — 99214 OFFICE O/P EST MOD 30 MIN: CPT | Performed by: FAMILY MEDICINE

## 2021-03-17 PROCEDURE — G8399 PT W/DXA RESULTS DOCUMENT: HCPCS | Performed by: FAMILY MEDICINE

## 2021-03-17 PROCEDURE — G9899 SCRN MAM PERF RSLTS DOC: HCPCS | Performed by: FAMILY MEDICINE

## 2021-03-17 PROCEDURE — 1090F PRES/ABSN URINE INCON ASSESS: CPT | Performed by: FAMILY MEDICINE

## 2021-03-17 PROCEDURE — G8417 CALC BMI ABV UP PARAM F/U: HCPCS | Performed by: FAMILY MEDICINE

## 2021-03-17 PROCEDURE — 1100F PTFALLS ASSESS-DOCD GE2>/YR: CPT | Performed by: FAMILY MEDICINE

## 2021-03-17 NOTE — PROGRESS NOTES
Loni Patel is a 77 y.o. female who was seen by synchronous (real-time) audio-video technology on 3/17/2021 for No chief complaint on file. Assessment & Plan:   Diagnoses and all orders for this visit:    1. Multiple sclerosis exacerbation ([address]  -     REFERRAL TO NEUROLOGY  -     REFERRAL TO HOME HEALTH    2. Weakness of extremity  -     REFERRAL TO NEUROLOGY  -     REFERRAL TO HOME HEALTH  -     CBC W/O DIFF; Future  -     METABOLIC PANEL, COMPREHENSIVE; Future    3. Hypertrophic toenail  -     REFERRAL TO PODIATRY    4. Urge incontinence  -     REFERRAL TO HOME HEALTH    5. Incomplete bladder emptying  -     REFERRAL TO HOME HEALTH    6. Catheter (urine) change required  -     [address] [place] [address] to evaluate for physical therapy, nurse to change catheter and also draw blood if possible due to decreased ability for patient to come to clinic        Subjective:     Patient is a 68-year-old female who is following up for exacerbation of MS. Patient states for the last several months she has been having extreme weakness and I am unable to ambulate. She has not followed up with neurology and states that she has fallen out of touch with her physicians but would like to restart relationships. Patient states that there has been no syncopal episodes, does state that she has weakness in her legs so severe that she is unable to bear her weight. She has been diagnosed with urge incontinence and incomplete bladder emptying which she needs catheter changes. Since she is unable to ambulate she would like a home health nurse to evaluate for this, also would like home physical therapy to help with strengthening. Prior to Admission medications    Medication Sig Start Date End Date Taking?  Authorizing Provider   tiZANidine (ZANAFLEX) 4 mg tablet TAKE ONE TABLET BY MOUTH EVERY 8 HOURS AS NEEDED 3/15/21   Brook Hinton MD   buPROPion XL (WELLBUTRIN XL) 150 mg tablet TAKE ONE TABLET BY MOUTH EVERY MORNING 6/25/20   Shae Baer MD   sertraline (ZOLOFT) 50 mg tablet TAKE ONE TABLET BY MOUTH DAILY 6/18/20   Shae Baer MD   escitalopram oxalate (LEXAPRO) 10 mg tablet Take 1 Tab by mouth daily. 5/18/20   Shae Baer MD   varicella-zoster recombinant, PF, (Shingrix, PF,) 50 mcg/0.5 mL susr injection 0.5mL by IntraMUSCular route once now and then repeat in 2-6 months 5/15/20   Shae Baer MD   oxybutynin (DITROPAN) 5 mg tablet Take 1 Tab by mouth three (3) times daily. 2/4/19   Anastacia Serna,    bisacodyl (DULCOLAX) 10 mg suppository Insert 10 mg into rectum daily as needed. 3/1/17   Allie Garza MD     Current Outpatient Medications   Medication Sig Dispense Refill    tiZANidine (ZANAFLEX) 4 mg tablet TAKE ONE TABLET BY MOUTH EVERY 8 HOURS AS NEEDED 60 Tab 0    buPROPion XL (WELLBUTRIN XL) 150 mg tablet TAKE ONE TABLET BY MOUTH EVERY MORNING 30 Tab 0    sertraline (ZOLOFT) 50 mg tablet TAKE ONE TABLET BY MOUTH DAILY 90 Tab 1    escitalopram oxalate (LEXAPRO) 10 mg tablet Take 1 Tab by mouth daily. 30 Tab 0    varicella-zoster recombinant, PF, (Shingrix, PF,) 50 mcg/0.5 mL susr injection 0.5mL by IntraMUSCular route once now and then repeat in 2-6 months 0.5 mL 1    oxybutynin (DITROPAN) 5 mg tablet Take 1 Tab by mouth three (3) times daily. 90 Tab 1    bisacodyl (DULCOLAX) 10 mg suppository Insert 10 mg into rectum daily as needed.  1 Each 0     No Known Allergies  Past Medical History:   Diagnosis Date    Depression     Menopause     [facility] [address]     Neurological disorder     Multiple sclerosis, in remission 2011    Other ill-defined conditions(799.89)     Mitral Value Prolapse     Past Surgical History:   Procedure Laterality Date    ABDOMEN SURGERY PROC UNLISTED      hernia repair    HX HEENT      HX HYSTERECTOMY      IR ASP BLADDER SUPRA CATH  6/30/2020     Family History   Problem Relation Age of Onset    Breast Cancer Mother         52's     Social History Tobacco Use    Smoking status: Former Smoker     Quit date:      Years since quittin.2    Smokeless tobacco: Never Used   Substance Use Topics    Alcohol use: Yes     Alcohol/week: 3.0 standard drinks     Types: 3 Shots of liquor per week     Comment: 3 x week       ROS    Objective:   No flowsheet data found. [INSTRUCTIONS:  \"[x]\" Indicates a positive item  \"[]\" Indicates a negative item  -- DELETE ALL ITEMS NOT EXAMINED]    Constitutional: [x] Appears well-developed and well-nourished [x] No apparent distress      [] Abnormal -     Mental status: [x] Alert and awake  [x] Oriented to person/place/time [x] Able to follow commands    [] Abnormal -     Eyes:   EOM    [x]  Normal    [] Abnormal -   Sclera  [x]  Normal    [] Abnormal -          Discharge [x]  None visible   [] Abnormal -     HENT: [x] Normocephalic, atraumatic  [] Abnormal -   [x] Mouth/Throat: Mucous membranes are moist    External Ears [x] Normal  [] Abnormal -    Neck: [x] No visualized mass [] Abnormal -     Pulmonary/Chest: [x] Respiratory effort normal   [x] No visualized signs of difficulty breathing or respiratory distress        [] Abnormal -      Musculoskeletal:   [x] Normal gait with no signs of ataxia         [x] Normal range of motion of neck        [] Abnormal -     Neurological:        [x] No Facial Asymmetry (Cranial nerve 7 motor function) (limited exam due to video visit)          [x] No gaze palsy        [] Abnormal -          Skin:        [x] No significant exanthematous lesions or discoloration noted on facial skin         [] Abnormal -            Psychiatric:       [x] Normal Affect [] Abnormal -        [x] No Hallucinations    Other pertinent observable physical exam findings:-        We discussed the expected course, resolution and complications of the diagnosis(es) in detail. Medication risks, benefits, costs, interactions, and alternatives were discussed as indicated.   I advised her to contact the office if her condition worsens, changes or fails to improve as anticipated. She expressed understanding with the diagnosis(es) and plan. Tracy Bojorquez, was evaluated through a synchronous (real-time) audio-video encounter. The patient (or guardian if applicable) is aware that this is a billable service. Verbal consent to proceed has been obtained within the past 12 months. The visit was conducted pursuant to the emergency declaration under the [address] [place] [address] and the [email or web address] and [email or web address] General Act. Patient identification was verified, and a caregiver was present when appropriate. The patient was located in a state where the provider was credentialed to provide care.       Martina Bah MD

## 2021-03-19 ENCOUNTER — HOME HEALTH ADMISSION (OUTPATIENT)
Dept: HOME HEALTH SERVICES | Facility: HOME HEALTH | Age: 66
End: 2021-03-19
Payer: MEDICARE

## 2021-03-21 ENCOUNTER — HOSPITAL ENCOUNTER (OUTPATIENT)
Dept: LAB | Age: 66
Discharge: HOME | End: 2021-03-21
Payer: MEDICARE

## 2021-03-21 ENCOUNTER — HOME CARE VISIT (OUTPATIENT)
Dept: SCHEDULING | Facility: HOME HEALTH | Age: 66
End: 2021-03-21
Payer: MEDICARE

## 2021-03-21 PROCEDURE — 400018 HH-NO PAY CLAIM PROCEDURE

## 2021-03-21 PROCEDURE — 87086 URINE CULTURE/COLONY COUNT: CPT

## 2021-03-21 PROCEDURE — 87077 CULTURE AEROBIC IDENTIFY: CPT

## 2021-03-21 PROCEDURE — 3331090002 HH PPS REVENUE DEBIT

## 2021-03-21 PROCEDURE — 87088 URINE BACTERIA CULTURE: CPT

## 2021-03-21 PROCEDURE — 87186 SC STD MICRODIL/AGAR DIL: CPT

## 2021-03-21 PROCEDURE — 3331090001 HH PPS REVENUE CREDIT

## 2021-03-21 PROCEDURE — G0299 HHS/HOSPICE OF RN EA 15 MIN: HCPCS

## 2021-03-21 PROCEDURE — 400013 HH SOC

## 2021-03-21 PROCEDURE — 81001 URINALYSIS AUTO W/SCOPE: CPT

## 2021-03-22 ENCOUNTER — HOME CARE VISIT (OUTPATIENT)
Dept: SCHEDULING | Facility: HOME HEALTH | Age: 66
End: 2021-03-22
Payer: MEDICARE

## 2021-03-22 ENCOUNTER — HOME CARE VISIT (OUTPATIENT)
Dept: HOME HEALTH SERVICES | Facility: HOME HEALTH | Age: 66
End: 2021-03-22
Payer: MEDICARE

## 2021-03-22 VITALS
TEMPERATURE: 97.4 F | DIASTOLIC BLOOD PRESSURE: 70 MMHG | HEART RATE: 88 BPM | RESPIRATION RATE: 17 BRPM | OXYGEN SATURATION: 99 % | SYSTOLIC BLOOD PRESSURE: 110 MMHG

## 2021-03-22 PROCEDURE — 3331090001 HH PPS REVENUE CREDIT

## 2021-03-22 PROCEDURE — G0151 HHCP-SERV OF PT,EA 15 MIN: HCPCS

## 2021-03-22 PROCEDURE — 3331090002 HH PPS REVENUE DEBIT

## 2021-03-23 ENCOUNTER — HOME CARE VISIT (OUTPATIENT)
Dept: HOME HEALTH SERVICES | Facility: HOME HEALTH | Age: 66
End: 2021-03-23
Payer: MEDICARE

## 2021-03-23 PROCEDURE — 3331090001 HH PPS REVENUE CREDIT

## 2021-03-23 PROCEDURE — 3331090002 HH PPS REVENUE DEBIT

## 2021-03-23 NOTE — PROGRESS NOTES
Per patient her aide comes at 10 am and leaves at 2 pm. Patient is by herself in the bed till the next day 10 am or until the aide return. Patient is currently bedridden and need total assist for toileting and all needs including bed mobility/transfer. Per patient she stays in bed all the time when her aide is not around. PT is aware that the admitting RN contacted the MD office in this matter to request the MD office to send the patient to a skilled nursing facility. PT requesting the same at this time because the patient will benefit from a skilled nursing facility stay to get 5 days of rehab and 24/7 care.

## 2021-03-24 PROCEDURE — 3331090002 HH PPS REVENUE DEBIT

## 2021-03-24 PROCEDURE — 3331090001 HH PPS REVENUE CREDIT

## 2021-03-25 ENCOUNTER — HOME CARE VISIT (OUTPATIENT)
Dept: SCHEDULING | Facility: HOME HEALTH | Age: 66
End: 2021-03-25
Payer: MEDICARE

## 2021-03-25 VITALS
OXYGEN SATURATION: 95 % | TEMPERATURE: 97.7 F | DIASTOLIC BLOOD PRESSURE: 66 MMHG | HEART RATE: 84 BPM | RESPIRATION RATE: 16 BRPM | SYSTOLIC BLOOD PRESSURE: 100 MMHG

## 2021-03-25 VITALS
OXYGEN SATURATION: 95 % | TEMPERATURE: 97.7 F | SYSTOLIC BLOOD PRESSURE: 100 MMHG | HEART RATE: 84 BPM | DIASTOLIC BLOOD PRESSURE: 66 MMHG

## 2021-03-25 PROCEDURE — G0299 HHS/HOSPICE OF RN EA 15 MIN: HCPCS

## 2021-03-25 PROCEDURE — A4314 CATH W/DRAINAGE 2-WAY LATEX: HCPCS

## 2021-03-25 PROCEDURE — G0151 HHCP-SERV OF PT,EA 15 MIN: HCPCS

## 2021-03-25 PROCEDURE — A4357 BEDSIDE DRAINAGE BAG: HCPCS

## 2021-03-25 PROCEDURE — G0152 HHCP-SERV OF OT,EA 15 MIN: HCPCS

## 2021-03-25 PROCEDURE — 3331090001 HH PPS REVENUE CREDIT

## 2021-03-25 PROCEDURE — 3331090002 HH PPS REVENUE DEBIT

## 2021-03-26 ENCOUNTER — APPOINTMENT (OUTPATIENT)
Dept: MRI IMAGING | Age: 66
End: 2021-03-26
Attending: EMERGENCY MEDICINE
Payer: MEDICARE

## 2021-03-26 ENCOUNTER — HOSPITAL ENCOUNTER (OUTPATIENT)
Age: 66
Setting detail: OBSERVATION
Discharge: NURSING, REHAB OR LONG-TERM CARE | End: 2021-03-29
Attending: EMERGENCY MEDICINE | Admitting: INTERNAL MEDICINE
Payer: MEDICARE

## 2021-03-26 ENCOUNTER — OFFICE VISIT (OUTPATIENT)
Dept: NEUROLOGY | Age: 66
End: 2021-03-26
Payer: MEDICARE

## 2021-03-26 ENCOUNTER — HOME CARE VISIT (OUTPATIENT)
Dept: HOME HEALTH SERVICES | Facility: HOME HEALTH | Age: 66
End: 2021-03-26
Payer: MEDICARE

## 2021-03-26 VITALS
WEIGHT: 200 LBS | DIASTOLIC BLOOD PRESSURE: 74 MMHG | HEIGHT: 63 IN | OXYGEN SATURATION: 96 % | RESPIRATION RATE: 16 BRPM | SYSTOLIC BLOOD PRESSURE: 110 MMHG | HEART RATE: 88 BPM | BODY MASS INDEX: 35.44 KG/M2

## 2021-03-26 VITALS
RESPIRATION RATE: 20 BRPM | HEART RATE: 73 BPM | DIASTOLIC BLOOD PRESSURE: 60 MMHG | OXYGEN SATURATION: 98 % | SYSTOLIC BLOOD PRESSURE: 106 MMHG | TEMPERATURE: 98.1 F

## 2021-03-26 DIAGNOSIS — G35 MULTIPLE SCLEROSIS EXACERBATION (HCC): Primary | ICD-10-CM

## 2021-03-26 DIAGNOSIS — G82.20 PARAPLEGIA (HCC): ICD-10-CM

## 2021-03-26 DIAGNOSIS — N39.0 URINARY TRACT INFECTION WITHOUT HEMATURIA, SITE UNSPECIFIED: Primary | ICD-10-CM

## 2021-03-26 DIAGNOSIS — G35 MULTIPLE SCLEROSIS EXACERBATION (HCC): ICD-10-CM

## 2021-03-26 LAB
ALBUMIN SERPL-MCNC: 4 G/DL (ref 3.5–5)
ALBUMIN/GLOB SERPL: 0.8 {RATIO} (ref 1.1–2.2)
ALP SERPL-CCNC: 74 U/L (ref 45–117)
ALT SERPL-CCNC: 27 U/L (ref 12–78)
AMPHET UR QL SCN: NEGATIVE
ANION GAP SERPL CALC-SCNC: 6 MMOL/L (ref 5–15)
APAP SERPL-MCNC: <2 UG/ML (ref 10–30)
APPEARANCE UR: CLEAR
AST SERPL-CCNC: 13 U/L (ref 15–37)
BACTERIA URNS QL MICRO: NEGATIVE /HPF
BARBITURATES UR QL SCN: NEGATIVE
BASOPHILS # BLD: 0 K/UL (ref 0–0.1)
BASOPHILS NFR BLD: 0 % (ref 0–1)
BENZODIAZ UR QL: NEGATIVE
BILIRUB SERPL-MCNC: 0.2 MG/DL (ref 0.2–1)
BILIRUB UR QL: NEGATIVE
BUN SERPL-MCNC: 10 MG/DL (ref 6–20)
BUN/CREAT SERPL: 18 (ref 12–20)
CALCIUM SERPL-MCNC: 9.6 MG/DL (ref 8.5–10.1)
CANNABINOIDS UR QL SCN: NEGATIVE
CHLORIDE SERPL-SCNC: 104 MMOL/L (ref 97–108)
CO2 SERPL-SCNC: 28 MMOL/L (ref 21–32)
COCAINE UR QL SCN: NEGATIVE
COLOR UR: ABNORMAL
COMMENT, HOLDF: NORMAL
CREAT SERPL-MCNC: 0.56 MG/DL (ref 0.55–1.02)
CRP SERPL-MCNC: 0.87 MG/DL (ref 0–0.6)
DIFFERENTIAL METHOD BLD: ABNORMAL
DRUG SCRN COMMENT,DRGCM: NORMAL
EOSINOPHIL # BLD: 0.1 K/UL (ref 0–0.4)
EOSINOPHIL NFR BLD: 1 % (ref 0–7)
EPITH CASTS URNS QL MICRO: ABNORMAL /LPF
ERYTHROCYTE [DISTWIDTH] IN BLOOD BY AUTOMATED COUNT: 14.5 % (ref 11.5–14.5)
ERYTHROCYTE [SEDIMENTATION RATE] IN BLOOD: 9 MM/HR (ref 0–30)
ETHANOL SERPL-MCNC: <10 MG/DL
GLOBULIN SER CALC-MCNC: 4.9 G/DL (ref 2–4)
GLUCOSE SERPL-MCNC: 87 MG/DL (ref 65–100)
GLUCOSE UR STRIP.AUTO-MCNC: NEGATIVE MG/DL
HCT VFR BLD AUTO: 45.2 % (ref 35–47)
HGB BLD-MCNC: 14.2 G/DL (ref 11.5–16)
HGB UR QL STRIP: ABNORMAL
IMM GRANULOCYTES # BLD AUTO: 0.1 K/UL (ref 0–0.04)
IMM GRANULOCYTES NFR BLD AUTO: 0 % (ref 0–0.5)
KETONES UR QL STRIP.AUTO: NEGATIVE MG/DL
LEUKOCYTE ESTERASE UR QL STRIP.AUTO: ABNORMAL
LYMPHOCYTES # BLD: 2.1 K/UL (ref 0.8–3.5)
LYMPHOCYTES NFR BLD: 18 % (ref 12–49)
MAGNESIUM SERPL-MCNC: 2.2 MG/DL (ref 1.6–2.4)
MCH RBC QN AUTO: 26.2 PG (ref 26–34)
MCHC RBC AUTO-ENTMCNC: 31.4 G/DL (ref 30–36.5)
MCV RBC AUTO: 83.5 FL (ref 80–99)
METHADONE UR QL: NEGATIVE
MONOCYTES # BLD: 0.7 K/UL (ref 0–1)
MONOCYTES NFR BLD: 6 % (ref 5–13)
NEUTS SEG # BLD: 8.5 K/UL (ref 1.8–8)
NEUTS SEG NFR BLD: 75 % (ref 32–75)
NITRITE UR QL STRIP.AUTO: POSITIVE
NRBC # BLD: 0 K/UL (ref 0–0.01)
NRBC BLD-RTO: 0 PER 100 WBC
OPIATES UR QL: NEGATIVE
PCP UR QL: NEGATIVE
PH UR STRIP: 5 [PH] (ref 5–8)
PLATELET # BLD AUTO: 391 K/UL (ref 150–400)
PMV BLD AUTO: 9.9 FL (ref 8.9–12.9)
POTASSIUM SERPL-SCNC: 3.5 MMOL/L (ref 3.5–5.1)
PROT SERPL-MCNC: 8.9 G/DL (ref 6.4–8.2)
PROT UR STRIP-MCNC: 30 MG/DL
RBC # BLD AUTO: 5.41 M/UL (ref 3.8–5.2)
RBC #/AREA URNS HPF: ABNORMAL /HPF (ref 0–5)
SALICYLATES SERPL-MCNC: <1.7 MG/DL (ref 2.8–20)
SAMPLES BEING HELD,HOLD: NORMAL
SODIUM SERPL-SCNC: 138 MMOL/L (ref 136–145)
SP GR UR REFRACTOMETRY: 1.02 (ref 1–1.03)
TROPONIN I SERPL-MCNC: <0.05 NG/ML
UROBILINOGEN UR QL STRIP.AUTO: 0.2 EU/DL (ref 0.2–1)
WBC # BLD AUTO: 11.5 K/UL (ref 3.6–11)
WBC URNS QL MICRO: ABNORMAL /HPF (ref 0–4)

## 2021-03-26 PROCEDURE — 86140 C-REACTIVE PROTEIN: CPT

## 2021-03-26 PROCEDURE — 96374 THER/PROPH/DIAG INJ IV PUSH: CPT

## 2021-03-26 PROCEDURE — 74011250636 HC RX REV CODE- 250/636: Performed by: HOSPITALIST

## 2021-03-26 PROCEDURE — 80143 DRUG ASSAY ACETAMINOPHEN: CPT

## 2021-03-26 PROCEDURE — 3331090002 HH PPS REVENUE DEBIT

## 2021-03-26 PROCEDURE — 74011250636 HC RX REV CODE- 250/636: Performed by: EMERGENCY MEDICINE

## 2021-03-26 PROCEDURE — G8431 POS CLIN DEPRES SCRN F/U DOC: HCPCS | Performed by: PSYCHIATRY & NEUROLOGY

## 2021-03-26 PROCEDURE — 83735 ASSAY OF MAGNESIUM: CPT

## 2021-03-26 PROCEDURE — 84484 ASSAY OF TROPONIN QUANT: CPT

## 2021-03-26 PROCEDURE — 74011250637 HC RX REV CODE- 250/637: Performed by: NURSE PRACTITIONER

## 2021-03-26 PROCEDURE — 85652 RBC SED RATE AUTOMATED: CPT

## 2021-03-26 PROCEDURE — 3288F FALL RISK ASSESSMENT DOCD: CPT | Performed by: PSYCHIATRY & NEUROLOGY

## 2021-03-26 PROCEDURE — 87086 URINE CULTURE/COLONY COUNT: CPT

## 2021-03-26 PROCEDURE — G8399 PT W/DXA RESULTS DOCUMENT: HCPCS | Performed by: PSYCHIATRY & NEUROLOGY

## 2021-03-26 PROCEDURE — 99215 OFFICE O/P EST HI 40 MIN: CPT | Performed by: PSYCHIATRY & NEUROLOGY

## 2021-03-26 PROCEDURE — 1100F PTFALLS ASSESS-DOCD GE2>/YR: CPT | Performed by: PSYCHIATRY & NEUROLOGY

## 2021-03-26 PROCEDURE — G8417 CALC BMI ABV UP PARAM F/U: HCPCS | Performed by: PSYCHIATRY & NEUROLOGY

## 2021-03-26 PROCEDURE — 99284 EMERGENCY DEPT VISIT MOD MDM: CPT

## 2021-03-26 PROCEDURE — 81001 URINALYSIS AUTO W/SCOPE: CPT

## 2021-03-26 PROCEDURE — 3017F COLORECTAL CA SCREEN DOC REV: CPT | Performed by: PSYCHIATRY & NEUROLOGY

## 2021-03-26 PROCEDURE — 72156 MRI NECK SPINE W/O & W/DYE: CPT

## 2021-03-26 PROCEDURE — G8427 DOCREV CUR MEDS BY ELIG CLIN: HCPCS | Performed by: PSYCHIATRY & NEUROLOGY

## 2021-03-26 PROCEDURE — 65270000029 HC RM PRIVATE

## 2021-03-26 PROCEDURE — 1090F PRES/ABSN URINE INCON ASSESS: CPT | Performed by: PSYCHIATRY & NEUROLOGY

## 2021-03-26 PROCEDURE — G9899 SCRN MAM PERF RSLTS DOC: HCPCS | Performed by: PSYCHIATRY & NEUROLOGY

## 2021-03-26 PROCEDURE — 82077 ASSAY SPEC XCP UR&BREATH IA: CPT

## 2021-03-26 PROCEDURE — 80179 DRUG ASSAY SALICYLATE: CPT

## 2021-03-26 PROCEDURE — A9575 INJ GADOTERATE MEGLUMI 0.1ML: HCPCS | Performed by: HOSPITALIST

## 2021-03-26 PROCEDURE — 80053 COMPREHEN METABOLIC PANEL: CPT

## 2021-03-26 PROCEDURE — 3331090001 HH PPS REVENUE CREDIT

## 2021-03-26 PROCEDURE — 80307 DRUG TEST PRSMV CHEM ANLYZR: CPT

## 2021-03-26 PROCEDURE — 70553 MRI BRAIN STEM W/O & W/DYE: CPT

## 2021-03-26 PROCEDURE — 85025 COMPLETE CBC W/AUTO DIFF WBC: CPT

## 2021-03-26 PROCEDURE — 74011000258 HC RX REV CODE- 258: Performed by: EMERGENCY MEDICINE

## 2021-03-26 PROCEDURE — 65390000012 HC CONDITION CODE 44 OBSERVATION

## 2021-03-26 PROCEDURE — G8536 NO DOC ELDER MAL SCRN: HCPCS | Performed by: PSYCHIATRY & NEUROLOGY

## 2021-03-26 PROCEDURE — 36415 COLL VENOUS BLD VENIPUNCTURE: CPT

## 2021-03-26 RX ORDER — SERTRALINE HYDROCHLORIDE 50 MG/1
50 TABLET, FILM COATED ORAL EVERY EVENING
Status: DISCONTINUED | OUTPATIENT
Start: 2021-03-26 | End: 2021-03-29 | Stop reason: HOSPADM

## 2021-03-26 RX ORDER — SODIUM CHLORIDE 0.9 % (FLUSH) 0.9 %
10 SYRINGE (ML) INJECTION
Status: COMPLETED | OUTPATIENT
Start: 2021-03-26 | End: 2021-03-26

## 2021-03-26 RX ORDER — DIPHENHYDRAMINE HCL 25 MG
25 CAPSULE ORAL
Status: DISCONTINUED | OUTPATIENT
Start: 2021-03-26 | End: 2021-03-27

## 2021-03-26 RX ORDER — POLYETHYLENE GLYCOL 3350 17 G/17G
17 POWDER, FOR SOLUTION ORAL DAILY
Status: DISCONTINUED | OUTPATIENT
Start: 2021-03-27 | End: 2021-03-29 | Stop reason: HOSPADM

## 2021-03-26 RX ORDER — DOCUSATE SODIUM 100 MG/1
100 CAPSULE, LIQUID FILLED ORAL DAILY
Status: DISCONTINUED | OUTPATIENT
Start: 2021-03-27 | End: 2021-03-29 | Stop reason: HOSPADM

## 2021-03-26 RX ORDER — GADOTERATE MEGLUMINE 376.9 MG/ML
20 INJECTION INTRAVENOUS
Status: COMPLETED | OUTPATIENT
Start: 2021-03-26 | End: 2021-03-26

## 2021-03-26 RX ORDER — ACETAMINOPHEN 325 MG/1
650 TABLET ORAL
Status: DISCONTINUED | OUTPATIENT
Start: 2021-03-26 | End: 2021-03-29 | Stop reason: HOSPADM

## 2021-03-26 RX ORDER — ONDANSETRON 2 MG/ML
4 INJECTION INTRAMUSCULAR; INTRAVENOUS
Status: DISCONTINUED | OUTPATIENT
Start: 2021-03-26 | End: 2021-03-29 | Stop reason: HOSPADM

## 2021-03-26 RX ORDER — FACIAL-BODY WIPES
10 EACH TOPICAL DAILY PRN
Status: DISCONTINUED | OUTPATIENT
Start: 2021-03-26 | End: 2021-03-29 | Stop reason: HOSPADM

## 2021-03-26 RX ORDER — TIZANIDINE 2 MG/1
4 TABLET ORAL
Status: DISCONTINUED | OUTPATIENT
Start: 2021-03-26 | End: 2021-03-29 | Stop reason: HOSPADM

## 2021-03-26 RX ADMIN — SERTRALINE 50 MG: 50 TABLET, FILM COATED ORAL at 20:04

## 2021-03-26 RX ADMIN — TIZANIDINE 4 MG: 2 TABLET ORAL at 20:04

## 2021-03-26 RX ADMIN — Medication 10 ML: at 18:26

## 2021-03-26 RX ADMIN — DIPHENHYDRAMINE HYDROCHLORIDE 25 MG: 25 CAPSULE ORAL at 23:37

## 2021-03-26 RX ADMIN — GADOTERATE MEGLUMINE 20 ML: 376.9 INJECTION INTRAVENOUS at 18:25

## 2021-03-26 RX ADMIN — CEFTRIAXONE SODIUM 1 G: 1 INJECTION, POWDER, FOR SOLUTION INTRAMUSCULAR; INTRAVENOUS at 17:02

## 2021-03-26 NOTE — ED PROVIDER NOTES
Please note that this dictation was completed with [employer], the computer voice recognition software.  Quite often unanticipated grammatical, syntax, homophones, and other interpretive errors are inadvertently transcribed by the computer software.  Please disregard these errors.  Please excuse any errors that have escaped final proofreading. 69-year-old female past medical history markable depression, menopausal, multiple sclerosis, mitral valve prolapse presents ER complaining of \" increased weakness generally since awakening this morning. Patient states she is also nonambulatory but has been nonambulatory for \"several years. \"  Patient denies recent illnesses cough shortness of breath burning with urination acute vision changes. Patient states she spoke with Dr. Mauricio Fox who told her to come in to be evaluated admitted for her symptoms. States her last exacerbation was approximately 1 to 2 years ago. Patient states that Dr. Mauricio Fox must been confused about her SI \"that was a long time ago not anything recently. \"    pt denies HA, vison changes, diff swallowing, CP, SOB, Abd pain, F/Ch, N/V, D/Cons or other current systemic complaints    Social/ PSH reviewed in EMR    EMR Chart Reviewed -Dr. Kim Tang note from earlier today's office visit was reviewed does not recommend steroids be started currently needs to start working on placement and overnight MRI of the head and C-spine should be ordered which I will order now. She is also recommending a psychiatric consultation and evaluation and have sent the psychiatric clearance labs as well. Also per the note the patient is basically failure to thrive at home does not have around-the-clock healthcare is unable to 10 for herself at home constituting a danger to herself.            Past Medical History:   Diagnosis Date    Depression     Menopause     [facility] [address] [address]     Neurological disorder     Multiple sclerosis, in remission 2011    Other ill-defined conditions(799.89)     Mitral Value Prolapse       Past Surgical History:   Procedure Laterality Date    HX HEENT      HX HYSTERECTOMY      IR ASP BLADDER SUPRA CATH  2020    OR ABDOMEN SURGERY PROC UNLISTED      hernia repair         Family History:   Problem Relation Age of Onset    Breast Cancer Mother         52's       Social History     Socioeconomic History    Marital status: SINGLE     Spouse name: Not on file    Number of children: Not on file    Years of education: Not on file    Highest education level: Not on file   Occupational History    Not on file   Social Needs    Financial resource strain: Not on file    Food insecurity     Worry: Never true     Inability: Never true    Transportation needs     Medical: No     Non-medical: No   Tobacco Use    Smoking status: Former Smoker     Quit date:      Years since quittin.2    Smokeless tobacco: Never Used   Substance and Sexual Activity    Alcohol use:  Yes     Alcohol/week: 3.0 standard drinks     Types: 3 Shots of liquor per week     Comment: 3 x week    Drug use: Yes     Frequency: 1.0 times per week     Types: Marijuana     Comment: occasional marijuana    Sexual activity: Not Currently   Lifestyle    Physical activity     Days per week: Not on file     Minutes per session: Not on file    Stress: Not on file   Relationships    Social connections     Talks on phone: Not on file     Gets together: Not on file     Attends [employer] service: Not on file     Active member of club or organization: Not on file     Attends meetings of clubs or organizations: Not on file     Relationship status: Not on file    Intimate partner violence     Fear of current or ex partner: Not on file     Emotionally abused: Not on file     Physically abused: Not on file     Forced sexual activity: Not on file   Other Topics Concern     Service Not Asked    Blood Transfusions Not Asked    Caffeine Concern Not Asked    Occupational Exposure Not Asked   [place] Hazards Not Asked    Sleep Concern Not Asked    Stress Concern Not Asked    Weight Concern Not Asked    Special Diet Not Asked    Back Care Not Asked    Exercise Not Asked    Bike Helmet Not Asked   [address] Not Asked    Self-Exams Not Asked   Social History Narrative    Not on file         ALLERGIES: Patient has no known allergies. Review of Systems   Constitutional: Positive for fatigue. Negative for chills. HENT: Negative for dental problem, trouble swallowing and voice change. Eyes: Negative for visual disturbance. Respiratory: Negative for cough and chest tightness. Cardiovascular: Negative for chest pain, palpitations and leg swelling. Gastrointestinal: Negative for abdominal pain, diarrhea, nausea and vomiting. Genitourinary: Negative for dysuria. Musculoskeletal: Negative for back pain and neck stiffness. Skin: Negative for rash. Neurological: Positive for weakness. Negative for speech difficulty. All other systems reviewed and are negative. Vitals:    03/26/21 1600 03/26/21 1916 03/26/21 2002 03/27/21 0327   BP: (!) 113/94 116/67 126/85 (!) 91/50   Pulse: 79 86 (!) 102 73   Resp: 14 16 16 16   Temp:  98.3 °F (36.8 °C) 98.4 °F (36.9 °C) 98.5 °F (36.9 °C)   SpO2: 99% 95% 95% 97%   Weight:       Height:                Physical Exam  Vitals signs and nursing note reviewed. Constitutional:       General: She is not in acute distress. Appearance: Normal appearance. She is well-developed. She is not ill-appearing, toxic-appearing or diaphoretic. Comments: Paraplegia 2ary to MS at baseline, NAD, AxOx4, speaking in complete sentences     HENT:      Head: Normocephalic and atraumatic. Right Ear: External ear normal.      Left Ear: External ear normal.      Nose: Nose normal.   Eyes:      General: No scleral icterus. Right eye: No discharge.       Conjunctiva/sclera: Conjunctivae normal.      Pupils: Pupils are equal, round, and reactive to light. Neck:      Musculoskeletal: Normal range of motion and neck supple. No neck rigidity or muscular tenderness. Vascular: No JVD. Trachea: No tracheal deviation. Cardiovascular:      Rate and Rhythm: Normal rate and regular rhythm. Pulses: Normal pulses. Heart sounds: Normal heart sounds. No murmur. No friction rub. No gallop. Pulmonary:      Effort: Pulmonary effort is normal. No respiratory distress. Breath sounds: Normal breath sounds. No stridor. No wheezing, rhonchi or rales. Chest:      Chest wall: No tenderness. Abdominal:      General: Bowel sounds are normal. There is no distension. Palpations: Abdomen is soft. There is no mass. Tenderness: There is no abdominal tenderness. There is no right CVA tenderness, left CVA tenderness, guarding or rebound. Hernia: No hernia is present. Genitourinary:     Vagina: No vaginal discharge. Musculoskeletal: Normal range of motion. General: No swelling, tenderness or deformity. Right lower leg: No edema. Left lower leg: No edema. Skin:     General: Skin is warm and dry. Capillary Refill: Capillary refill takes less than 2 seconds. Coloration: Skin is not jaundiced or pale. Findings: No bruising, erythema or rash. Neurological:      General: No focal deficit present. Mental Status: She is alert and oriented to person, place, and time. Mental status is at baseline. Cranial Nerves: No cranial nerve deficit. Sensory: Sensory deficit present. Motor: Weakness present. No abnormal muscle tone. Coordination: Coordination abnormal.      Gait: Gait abnormal.      Deep Tendon Reflexes: Reflexes abnormal.   Psychiatric:         Behavior: Behavior normal.         Thought Content:  Thought content normal.          MDM       Procedures       Chief Complaint   Patient presents with    Referral Request    Mental Health Problem    Suicidal 4:13 PM  The patients presenting problems have been discussed, and they are in agreement with the care plan formulated and outlined with them. I have encouraged them to ask questions as they arise throughout their visit. MEDICATIONS GIVEN:  Medications - No data to display    LABS REVIEWED:  Labs Reviewed   CBC WITH AUTOMATED DIFF - Abnormal; Notable for the following components:       Result Value    WBC 11.5 (*)     RBC 5.41 (*)     ABS. NEUTROPHILS 8.5 (*)     ABS. IMM. GRANS. 0.1 (*)     All other components within normal limits   METABOLIC PANEL, COMPREHENSIVE - Abnormal; Notable for the following components:    AST (SGOT) 13 (*)     Protein, total 8.9 (*)     Globulin 4.9 (*)     A-G Ratio 0.8 (*)     All other components within normal limits   URINALYSIS W/MICROSCOPIC - Abnormal; Notable for the following components:    Protein 30 (*)     Blood SMALL (*)     Nitrites Positive (*)     Leukocyte Esterase MODERATE (*)     All other components within normal limits   ACETAMINOPHEN - Abnormal; Notable for the following components:    Acetaminophen level <2 (*)     All other components within normal limits   C REACTIVE PROTEIN, QT - Abnormal; Notable for the following components:    C-Reactive protein 0.87 (*)     All other components within normal limits   SALICYLATE - Abnormal; Notable for the following components:    Salicylate level <2.6 (*)     All other components within normal limits   SAMPLES BEING HELD   DRUG SCREEN, URINE   ETHYL ALCOHOL   MAGNESIUM   SED RATE (ESR)   TROPONIN I       RADIOLOGY RESULTS:  The following have been ordered and reviewed:  _____________________________________________________________________  _____________________________________________________________________        PROCEDURES:        CONSULTATIONS:       PROGRESS NOTES:      DIAGNOSIS:    1. Urinary tract infection without hematuria, site unspecified    2.  Multiple sclerosis exacerbation ([address]        PLAN:  1-admit/ monitor;       ED COURSE: The patients hospital course has been uncomplicated. Perfect Serve Consult for Admission  4:41 PM    ED Room Number: ER20/20  Patient Name and age:  [facility] 77 y.o.  female  Working Diagnosis:   1. Urinary tract infection without hematuria, site unspecified    2.  Multiple sclerosis exacerbation ([facility] [address]        COVID-19 Suspicion:  no  Sepsis present:  no  Reassessment needed: no  Code Status:  Full Code  Readmission: no  Isolation Requirements:  no  Recommended Level of Care:  telemetry  Department:[facility] Adult ED - 21   Other:  [facility] - Rocephin; Multiple Sclerosis flare - Dr Jeffrey Bray sent in/ 'unable to continue at home alone

## 2021-03-26 NOTE — PROGRESS NOTES
patient nurses aid states she need something from her provider about the patient living situtation. .  Chief Complaint   Patient presents with    Follow-up     patient nurses aid states she need something from her provider about the patient living situtation     .   Visit Vitals  /74 (BP 1 Location: Left upper arm, BP Patient Position: Sitting)   Pulse 88   Resp 16   Ht 5' 3\" (1.6 m)   Wt 200 lb (90.7 kg)   SpO2 96%   BMI 35.43 kg/m²

## 2021-03-26 NOTE — ROUTINE PROCESS
TRANSFER - OUT REPORT: 
 
Verbal report given to Valarie Floyd RN(name) on Dickson Mage  being transferred to (unit) for routine progression of care Report consisted of patients Situation, Background, Assessment and  
Recommendations(SBAR). Information from the following report(s) SBAR, ED Summary, [facility] and Recent Results was reviewed with the receiving nurse. Lines:  
Peripheral IV 03/26/21 Left Antecubital (Active) Site Assessment Clean, dry, & intact 03/26/21 1452 Phlebitis Assessment 0 03/26/21 1452 Infiltration Assessment 0 03/26/21 1452 Dressing Status Clean, dry, & intact 03/26/21 1452 Dressing Type Transparent 03/26/21 1452 Opportunity for questions and clarification was provided. Patient transported with: 
 Avison Young Kalin Douglas RN

## 2021-03-26 NOTE — PROGRESS NOTES
Chief Complaint   Patient presents with    Follow-up     patient nurses aid states she need something from her provider about the patient living situtation       HPI        8year-old woman following up earlier than expected. She has MS. she is here with her home health aide who is with her from 10 AM until 3 PM.  I brought her in to be reevaluated because I have been receiving multiple phone calls from therapy and home health about Ms. Leroy Kimbrough being unable to care for herself. She is essentially bedbound now. She does have baseline paraplegia but it sounds like it is getting worse. She is having fecal incontinence now. She has a catheter for her bladder long-term. She had been prescribed Avonex for MS therapy but there is been difficulty getting the medication and actually administering the medication. ADLs are no longer intact that she cannot do her own bathing and toileting independently. Her depression screen is also abnormal.  Worsening depression. BKGD:  Ms. Leroy Kimbrough is a 73-year-old woman with a history of MS diagnosed in 1989 after suffering from left sided optic neuritis. She underwent testing to include MRI which confirmed the diagnosis. She was initially started on Avonex injections weekly and had recurrent episodes requiring steroid treatments. She stopped disease modifying drug treatment in 2005 and opted for a more natural alternative based approach. However, in the last few months she has noticed that her MS symptoms have flared and not recovering well. She has right leg numbness and weakness ongoing for 2 months. MS treatment history, Avonex, Rebif          Review of Systems   Constitutional: Positive for malaise/fatigue. Gastrointestinal:        Fecal incontinence   Neurological: Positive for weakness. Psychiatric/Behavioral: Positive for depression. Negative for suicidal ideas. All other systems reviewed and are negative.       Past Medical History:   Diagnosis Date    Depression  Menopause     [facility]     Neurological disorder     Multiple sclerosis, in remission     Other ill-defined conditions(799.89)     Mitral Value Prolapse     Family History   Problem Relation Age of Onset    Breast Cancer Mother         52's     Social History     Socioeconomic History    Marital status: SINGLE     Spouse name: Not on file    Number of children: Not on file    Years of education: Not on file    Highest education level: Not on file   Occupational History    Not on file   Social Needs    Financial resource strain: Not on file    Food insecurity     Worry: Never true     Inability: Never true    Transportation needs     Medical: No     Non-medical: No   Tobacco Use    Smoking status: Former Smoker     Quit date:      Years since quittin.2    Smokeless tobacco: Never Used   Substance and Sexual Activity    Alcohol use:  Yes     Alcohol/week: 3.0 standard drinks     Types: 3 Shots of liquor per week     Comment: 3 x week    Drug use: Yes     Frequency: 1.0 times per week     Types: Marijuana     Comment: occasional marijuana    Sexual activity: Not Currently   Lifestyle    Physical activity     Days per week: Not on file     Minutes per session: Not on file    Stress: Not on file   Relationships    Social connections     Talks on phone: Not on file     Gets together: Not on file     Attends Tenriism service: Not on file     Active member of club or organization: Not on file     Attends meetings of clubs or organizations: Not on file     Relationship status: Not on file    Intimate partner violence     Fear of current or ex partner: Not on file     Emotionally abused: Not on file     Physically abused: Not on file     Forced sexual activity: Not on file   Other Topics Concern     Service Not Asked    Blood Transfusions Not Asked    Caffeine Concern Not Asked    Occupational Exposure Not Asked   Espinoza Graver Hazards Not Asked    Sleep Concern Not Asked    Stress Concern Not Asked    Weight Concern Not Asked    Special Diet Not Asked    Back Care Not Asked    Exercise Not Asked    Bike Helmet Not Asked    Seat Belt Not Asked    Self-Exams Not Asked   Social History Narrative    Not on file     No Known Allergies      Current Outpatient Medications   Medication Sig    interferon beta-1a (Avonex) 30 mcg/0.5 mL pnkt 30 mcg by IntraMUSCular route Every Thursday.  tiZANidine (ZANAFLEX) 4 mg tablet TAKE ONE TABLET BY MOUTH EVERY 8 HOURS AS NEEDED    buPROPion XL (WELLBUTRIN XL) 150 mg tablet TAKE ONE TABLET BY MOUTH EVERY MORNING    sertraline (ZOLOFT) 50 mg tablet TAKE ONE TABLET BY MOUTH DAILY    escitalopram oxalate (LEXAPRO) 10 mg tablet Take 1 Tab by mouth daily.  varicella-zoster recombinant, PF, (Shingrix, PF,) 50 mcg/0.5 mL susr injection 0.5mL by IntraMUSCular route once now and then repeat in 2-6 months    oxybutynin (DITROPAN) 5 mg tablet Take 1 Tab by mouth three (3) times daily.  bisacodyl (DULCOLAX) 10 mg suppository Insert 10 mg into rectum daily as needed. No current facility-administered medications for this visit. Neurologic Exam     Mental Status   WD/WN adult in NAD, normal grooming  VSS  A&O x 3, pleasant and smiling    PERRL, nonicteric  Face is masked  Speech is clear  No limb ataxia. No abnl movements. Right upper extremity 3+, right lower extremity 1/5  Left upper extremity 3 - left lower extremity 2/5  Leon catheter noted  Gait deferredparaplegic in the legs with findings above             Visit Vitals  /74 (BP 1 Location: Left upper arm, BP Patient Position: Sitting)   Pulse 88   Resp 16   Ht 5' 3\" (1.6 m)   Wt 200 lb (90.7 kg)   SpO2 96%   BMI 35.43 kg/m²       Assessment and Plan   Diagnoses and all orders for this visit:    1.  Multiple sclerosis exacerbation ([facility]    2. Paraplegia [facility])      47-year-old woman who has multiple sclerosis at baseline whom I am concerned is having progressive disease. She is clinically getting worse. She was weak at baseline but now weak enough where she is bedbound and unable to care for herself. She is having new fecal incontinence. She needs new imaging to rule out an acute MS flare as well. I have referred her to the emergency room to be admitted. I spoke with the emergency room and they are aware she is coming. I recommend an MRI brain and cervical spine be done with contrast overnight. No steroids and so we have that. We will need case management involved intensively for placement purposes. Will not resume Avonex. We will decide on MS treatment at a later date. Patient is in agreement. She agrees to go to the ER be evaluated and I anticipate admitted by the hospital service. Mom also concerned about her depression getting worse. Consider psychiatry consult. I reviewed and decided to continue the current medications. This clinical note was dictated with an electronic dictation software that can make unintentional errors. If there are any questions, please contact me directly for clarification.       [address] [facility], [facility] [address]  Diplomate JAME

## 2021-03-26 NOTE — CONSULTS
Neuro is aware of patient. Please place a consult into epic.  I will see her again in the AM.      [address] [facility], DO  Neurologist  Diplomate, American Board of Psychiatry and Neurology  Board Certified, Adult Neurology and Brain Injury Medicine

## 2021-03-26 NOTE — ED TRIAGE NOTES
Pt arrives to ED via wheelchair accompanied by NICOLA London, from Dr. Yovana Ordoñez office, Neurologist. Pt referred to ED after expressing SI over the last 3 months without a plan. Pt lives alone from 3pm to 10am each day and is unable to help herself d/t MS.  has given family and medical professionals 3 weeks since 3/22/21 to find placement for pt.

## 2021-03-27 VITALS
SYSTOLIC BLOOD PRESSURE: 100 MMHG | HEART RATE: 77 BPM | RESPIRATION RATE: 17 BRPM | TEMPERATURE: 97.7 F | OXYGEN SATURATION: 97 % | DIASTOLIC BLOOD PRESSURE: 66 MMHG

## 2021-03-27 LAB
ANION GAP SERPL CALC-SCNC: 4 MMOL/L (ref 5–15)
BASOPHILS # BLD: 0 K/UL (ref 0–0.1)
BASOPHILS NFR BLD: 0 % (ref 0–1)
BUN SERPL-MCNC: 10 MG/DL (ref 6–20)
BUN/CREAT SERPL: 22 (ref 12–20)
CALCIUM SERPL-MCNC: 9 MG/DL (ref 8.5–10.1)
CHLORIDE SERPL-SCNC: 108 MMOL/L (ref 97–108)
CO2 SERPL-SCNC: 28 MMOL/L (ref 21–32)
CREAT SERPL-MCNC: 0.46 MG/DL (ref 0.55–1.02)
DIFFERENTIAL METHOD BLD: ABNORMAL
EOSINOPHIL # BLD: 0.2 K/UL (ref 0–0.4)
EOSINOPHIL NFR BLD: 2 % (ref 0–7)
ERYTHROCYTE [DISTWIDTH] IN BLOOD BY AUTOMATED COUNT: 14.3 % (ref 11.5–14.5)
GLUCOSE SERPL-MCNC: 80 MG/DL (ref 65–100)
HCT VFR BLD AUTO: 40.4 % (ref 35–47)
HGB BLD-MCNC: 12.9 G/DL (ref 11.5–16)
IMM GRANULOCYTES # BLD AUTO: 0 K/UL (ref 0–0.04)
IMM GRANULOCYTES NFR BLD AUTO: 1 % (ref 0–0.5)
LYMPHOCYTES # BLD: 2.1 K/UL (ref 0.8–3.5)
LYMPHOCYTES NFR BLD: 25 % (ref 12–49)
MCH RBC QN AUTO: 26 PG (ref 26–34)
MCHC RBC AUTO-ENTMCNC: 31.9 G/DL (ref 30–36.5)
MCV RBC AUTO: 81.5 FL (ref 80–99)
MONOCYTES # BLD: 0.6 K/UL (ref 0–1)
MONOCYTES NFR BLD: 7 % (ref 5–13)
NEUTS SEG # BLD: 5.6 K/UL (ref 1.8–8)
NEUTS SEG NFR BLD: 65 % (ref 32–75)
NRBC # BLD: 0 K/UL (ref 0–0.01)
NRBC BLD-RTO: 0 PER 100 WBC
PLATELET # BLD AUTO: 301 K/UL (ref 150–400)
PMV BLD AUTO: 10 FL (ref 8.9–12.9)
POTASSIUM SERPL-SCNC: 3.8 MMOL/L (ref 3.5–5.1)
RBC # BLD AUTO: 4.96 M/UL (ref 3.8–5.2)
SODIUM SERPL-SCNC: 140 MMOL/L (ref 136–145)
WBC # BLD AUTO: 8.5 K/UL (ref 3.6–11)

## 2021-03-27 PROCEDURE — 97165 OT EVAL LOW COMPLEX 30 MIN: CPT

## 2021-03-27 PROCEDURE — 3331090001 HH PPS REVENUE CREDIT

## 2021-03-27 PROCEDURE — 36415 COLL VENOUS BLD VENIPUNCTURE: CPT

## 2021-03-27 PROCEDURE — 97530 THERAPEUTIC ACTIVITIES: CPT

## 2021-03-27 PROCEDURE — 65270000029 HC RM PRIVATE

## 2021-03-27 PROCEDURE — 74011250636 HC RX REV CODE- 250/636: Performed by: NURSE PRACTITIONER

## 2021-03-27 PROCEDURE — 97535 SELF CARE MNGMENT TRAINING: CPT

## 2021-03-27 PROCEDURE — 74011250637 HC RX REV CODE- 250/637: Performed by: NURSE PRACTITIONER

## 2021-03-27 PROCEDURE — 80048 BASIC METABOLIC PNL TOTAL CA: CPT

## 2021-03-27 PROCEDURE — 65390000012 HC CONDITION CODE 44 OBSERVATION

## 2021-03-27 PROCEDURE — 97161 PT EVAL LOW COMPLEX 20 MIN: CPT

## 2021-03-27 PROCEDURE — 85025 COMPLETE CBC W/AUTO DIFF WBC: CPT

## 2021-03-27 PROCEDURE — 96376 TX/PRO/DX INJ SAME DRUG ADON: CPT

## 2021-03-27 PROCEDURE — 74011000258 HC RX REV CODE- 258: Performed by: NURSE PRACTITIONER

## 2021-03-27 PROCEDURE — 3331090002 HH PPS REVENUE DEBIT

## 2021-03-27 PROCEDURE — 99222 1ST HOSP IP/OBS MODERATE 55: CPT | Performed by: PSYCHIATRY & NEUROLOGY

## 2021-03-27 RX ORDER — DIPHENHYDRAMINE HCL 25 MG
50 CAPSULE ORAL
Status: DISCONTINUED | OUTPATIENT
Start: 2021-03-27 | End: 2021-03-29 | Stop reason: HOSPADM

## 2021-03-27 RX ORDER — DIPHENHYDRAMINE HCL 25 MG
25 CAPSULE ORAL ONCE
Status: COMPLETED | OUTPATIENT
Start: 2021-03-27 | End: 2021-03-27

## 2021-03-27 RX ADMIN — TIZANIDINE 4 MG: 2 TABLET ORAL at 04:00

## 2021-03-27 RX ADMIN — DIPHENHYDRAMINE HYDROCHLORIDE 25 MG: 25 CAPSULE ORAL at 19:23

## 2021-03-27 RX ADMIN — SERTRALINE 50 MG: 50 TABLET, FILM COATED ORAL at 18:32

## 2021-03-27 RX ADMIN — DIPHENHYDRAMINE HYDROCHLORIDE 25 MG: 25 CAPSULE ORAL at 23:14

## 2021-03-27 RX ADMIN — CEFTRIAXONE SODIUM 1 G: 1 INJECTION, POWDER, FOR SOLUTION INTRAMUSCULAR; INTRAVENOUS at 16:12

## 2021-03-27 RX ADMIN — DOCUSATE SODIUM 100 MG: 100 CAPSULE, LIQUID FILLED ORAL at 08:56

## 2021-03-27 RX ADMIN — POLYETHYLENE GLYCOL 3350 17 G: 17 POWDER, FOR SOLUTION ORAL at 08:56

## 2021-03-27 RX ADMIN — TIZANIDINE 4 MG: 2 TABLET ORAL at 23:14

## 2021-03-27 NOTE — PROGRESS NOTES
Transition of Care Plan   RUR- Low      DISPOSITION:TBD/ CM will need to follow   F/U with PCP/Specialist     Transport: [facility]/ wheel chair    Patient has been at [facility], would consider going back to . Patient has been at the SNF unit before. PT/ OT needs will be assessed. Reason for Admission:  MS Exerwaynetion                     RUR Score:          6%           Plan for utilizing home health:      TBD , patient stated that she does have [employer] only monthly for SN, no other HH at this point. Per patient, she has caregivers through [facility] for CBC from 10 to 3, there is a consult for further care in the home. The agency that patient is currently working with home personal care [facility] [phone] and [address], [address]    PCP: First and Last name:  Kodi Kruger MD     Name of Practice: unsure   Are you a current patient: Yes/No: yes   Approximate date of last visit: last Month Feb 2021   Can you participate in a virtual visit with your PCP: yes                    Current Advanced Directive/Advance Care Plan: DNR      Healthcare Decision Maker:   Click here to complete [address] including selection of the Healthcare Decision Maker Relationship (ie \"Primary\")             Primary Decision Maker: Susi Holly - Other Relative - [phone]                  Transition of Care Plan: This CM discussed with patient about dc planning and CM role in transition of care needs. See noted above, patient stated that she has been to [facility] in the past for SNF level of care. Patient stated that she would consider this as a plan if PT/ OT suggests. Per patient, patient transport by BLS. CM will need to follow for dc planning and support. Care Management Interventions  PCP Verified by CM:  Yes  Palliative Care Criteria Met (RRAT>21 & CHF Dx)?: No  Mode of Transport at Discharge: BLS  Transition of Care Consult (CM Consult): (CM to follow, patient has support through [facility] only monthly)  MyChart Signup: No  Discharge Durable Medical Equipment: No  Physical Therapy Consult: Yes  Occupational Therapy Consult: Yes  Speech Therapy Consult: No  Current Support Network: Has Personal Caregivers  Confirm Follow Up Transport: Family  Discharge Location  Discharge Placement: (TBD)   Ana Pacheco  9:47 AM

## 2021-03-27 NOTE — PROGRESS NOTES
Problem: Mobility Impaired (Adult and Pediatric)  Goal: *Acute Goals and Plan of Care (Insert Text)  Description: FUNCTIONAL STATUS PRIOR TO ADMISSION: The patient was functional at the wheelchair level and required moderate assistance for transfers to the chair. HOME SUPPORT PRIOR TO ADMISSION: The patient lived with aide for 6hrs/day, then remained in bed for the majority of the time. Kiera Delgado Physical Therapy Goals  Initiated 3/27/2021  1. Patient will move from supine to sit and sit to supine , scoot up and down, and roll side to side in bed with supervision/set-up within 7 day(s). 2.  Patient will transfer from bed to chair and chair to bed with minimal assistance/contact guard assist using the sliding board within 7 day(s). 3.  Patient will perform sitting without assist within 7 day(s). Outcome: Progressing Towards Goal       PHYSICAL THERAPY EVALUATION  Patient: Danny Duran (76 y.o. female)  Date: 3/27/2021  Primary Diagnosis: Multiple sclerosis exacerbation (HCC) [G35]        Precautions: falls         ASSESSMENT  Based on the objective data described below, the patient presents with impaired balance, impaired strength, nonambulatory, with MS causing limited independence and admitted s/p MS exacerbation. Pt PLOF: dependent to max A with ADLs and IADLs. Patient lives alone however aide comes for 6h/day, patient assisted with transfers to [facility] and performs with ballet bar at the end of the bed, returns to bed prior to aide leaving, with increased weakness at this time leading to less and less transfers to chair. Pt .   Pt received in bed, min-mod A for bed mobility, attempted SPT with max A for attempt to stand and does not appear safe for pivot transfers. Pt able to laterally scoot with min A, able to scoot laterally along sliding board with mod A uphill and mod-min A downhill. Pt tolerates transfers with significant fear of falling.  Reviewed back precautions, sitting limit of 30-45 minutes, positioning in chair, and progress. Pt is cleared for discharge from Physical Therapy standpoint at this time, recommend HHPT. Will benefit from therapy in acute setting, anticipate will improve tolerance quickly with improved pain control. Current Level of Function Impacting Discharge (mobility/balance): min A-mod A for bed mobility, unsafe to pivot transfer, demos increased assistance for sliding board and increased time. Functional Outcome Measure: The patient scored  /100 on the Barthel Index which is indicative of high impaired ability to care for basic self needs/dependency on others. Other factors to consider for discharge: pt lives alone, may require placement     Patient will benefit from skilled therapy intervention to address the above noted impairments. PLAN :  Recommendations and Planned Interventions: bed mobility training, transfer training, therapeutic exercises, neuromuscular re-education, patient and family training/education and therapeutic activities      Frequency/Duration: Patient will be followed by physical therapy:  5 times a week to address goals. Recommendation for discharge: (in order for the patient to meet his/her long term goals)  Therapy up to 5 days/week in SNF setting    This discharge recommendation:  Has been made in collaboration with the attending provider and/or case management    IF patient discharges home will need the following DME: sliding board         SUBJECTIVE:   Patient stated I know youre doing all of the work.     OBJECTIVE DATA SUMMARY:   HISTORY:    Past Medical History:   Diagnosis Date    Depression     Menopause     [facility] ([facility]     Neurological disorder     Multiple sclerosis, in remission 2011    Other ill-defined conditions(799.89)     Mitral Value Prolapse     Past Surgical History:   Procedure Laterality Date    HX HEENT      HX HYSTERECTOMY      IR ASP BLADDER SUPRA CATH  6/30/2020    LA ABDOMEN SURGERY PROC UNLISTED      hernia repair       Personal factors and/or comorbidities impacting plan of care:     Home Situation  Home Environment: Apartment  Wheelchair Ramp: Yes  Living Alone: Yes  Support Systems: Friends \ neighbors, Home care staff  Current DME Used/Available at Home: Wheelchair, power, Wheelchair, Walker, rolling(ballet bar to assist with bed>w/c)    EXAMINATION/PRESENTATION/DECISION MAKING:   Critical Behavior:              Hearing:     Skin:  intact  Edema: none  Range Of Motion:  AROM: Grossly decreased, non-functional(BLE, generally decreased for UE)           PROM: Grossly decreased, non-functional(BLE, generally decreased BUE)           Strength:    Strength: Grossly decreased, non-functional(BLE, generally decreased BUE)                    Tone & Sensation:   Tone: Abnormal(rigid RLE, hypotonic LLE)                              Coordination:  Coordination: Grossly decreased, non-functional  Vision:      Functional Mobility:  Bed Mobility:  Rolling: Moderate assistance;Minimum assistance  Supine to Sit: Minimum assistance; Moderate assistance  Sit to Supine: Moderate assistance;Assist x1  Scooting: Minimum assistance;Assist x1  Transfers:                 Lateral Transfers: Minimum assistance     Sliding Board : Moderate assistance;Assist x2     Balance:   Sitting: Intact; With support  Standing: (unable to stand)  Ambulation/Gait Training:       Functional Measure:  Barthel Index:                      Mobility: 0  Stairs: 0  Toilet Use: 5  Transfer (Bed to Chair and Back): 5   /100       The Barthel ADL Index: Guidelines  1. The index should be used as a record of what a patient does, not as a record of what a patient could do. 2. The main aim is to establish degree of independence from any help, physical or verbal, however minor and for whatever reason. 3. The need for supervision renders the patient not independent. 4. A patient's performance should be established using the best available evidence. Asking the patient, friends/relatives and nurses are the usual sources, but direct observation and common sense are also important. However direct testing is not needed. 5. Usually the patient's performance over the preceding 24-48 hours is important, but occasionally longer periods will be relevant. 6. Middle categories imply that the patient supplies over 50 per cent of the effort. 7. Use of aids to be independent is allowed. Radha Liriano, Barthel, D.W. (6465). Functional evaluation: the Barthel Index. [address] [facility] (14)2. Benjamin Rose justina NEHAL Urias, Gideon Batista., Anna Jean Baptiste., Di, [address] (1999). Measuring the change indisability after inpatient rehabilitation; comparison of the responsiveness of the Barthel Index and Functional [place] Measure. Journal of Neurology, Neurosurgery, and Psychiatry, 66(4), 285-343. FATIMAH Lee, DONNIE Mars, & Petty May MBRIAN. (2004.) Assessment of post-stroke quality of life in cost-effectiveness studies: The usefulness of the Barthel Index and the EuroQoL-5D. Quality of Life Research, 15, 350-05        Physical Therapy Evaluation Charge Determination   History Examination Presentation Decision-Making   HIGH Complexity :3+ comorbidities / personal factors will impact the outcome/ POC  HIGH Complexity : 4+ Standardized tests and measures addressing body structure, function, activity limitation and / or participation in recreation  LOW Complexity : Stable, uncomplicated  Other outcome measures barthel  HIGH       Based on the above components, the patient evaluation is determined to be of the following complexity level: LOW     Pain Rating:  Does not c/o pain at this time    Activity Tolerance:   Fair and requires rest breaks    After treatment patient left in no apparent distress:   Supine in bed and Call bell within reach    COMMUNICATION/EDUCATION:   The patients plan of care was discussed with: Registered nurse and Case management.      Fall prevention education was provided and the patient/caregiver indicated understanding. and Patient/family have participated as able in goal setting and plan of care.     Thank you for this referral.  Manolo Yanez, PT   Time Calculation: 39 mins

## 2021-03-27 NOTE — H&P
[facility] Adult  Hospitalist Group  History and Physical    Primary Care Provider: Flip Hunt MD  Date of Service:  3/26/2021    Subjective:     Luma Glasgow is a 77 y.o. female with a past medical history of multiple sclerosis, depression, and mitral valve prolapse that presented to the ER with complaints of increasing weakness. Patient was seen in neurologist office this morning, Dr. Ralph Herron who advised patient to come to the emergency department for admission. Dr. Ralph Herron is recommending MRI of the brain and C-spine to rule out an acute MS flare, as patient symptoms are progressively getting worse. Patient states that over the past couple months she has become progressively more weak and she feels that she needs more help at home but due to lack of family support she is unable to get the help. She currently has 1 aide that comes in for 5 to 6 hours a day. Past medical history taken from patient and chart. Patient has received first dose Moderna COVID-19 vaccination. She is unsure of when her second dose is scheduled. Patient denies any syncope, dizziness, shortness of breath, chest pain or tightness, nausea, vomiting, or diarrhea. Review of Systems:    A comprehensive review of systems was negative except for that written in the History of Present Illness. Past Medical History:   Diagnosis Date    Depression     Menopause     [facility] ([facility]     Neurological disorder     Multiple sclerosis, in remission 2011    Other ill-defined conditions(799.89)     Mitral Value Prolapse      Past Surgical History:   Procedure Laterality Date    HX HEENT      HX HYSTERECTOMY      IR ASP BLADDER SUPRA CATH  6/30/2020    [place] ABDOMEN SURGERY PROC UNLISTED      hernia repair     Prior to Admission medications    Medication Sig Start Date End Date Taking? Authorizing Provider   interferon beta-1a (Avonex) 30 mcg/0.5 mL pnkt 30 mcg by IntraMUSCular route Every Thursday. Provider, Historical   tiZANidine (ZANAFLEX) 4 mg tablet TAKE ONE TABLET BY MOUTH EVERY 8 HOURS AS NEEDED 3/15/21   Vimal Douglas MD   sertraline (ZOLOFT) 50 mg tablet TAKE ONE TABLET BY MOUTH DAILY 6/18/20   Vimal Douglas MD   varicella-zoster recombinant, PF, (Shingrix, PF,) 50 mcg/0.5 mL susr injection 0.5mL by IntraMUSCular route once now and then repeat in 2-6 months 5/15/20   Vimal Douglas MD   bisacodyl (DULCOLAX) 10 mg suppository Insert 10 mg into rectum daily as needed. 3/1/17   Lucille Lu MD     No Known Allergies   Family History   Problem Relation Age of Onset    Breast Cancer Mother         52's        SOCIAL HISTORY:  Patient resides at Home. Patient is nonambulatory. Paraplegic who until recently was able to get into wheelchair. Now she states she is unable to get into the wheelchair and she is bedbound. .   Smoking history: Denies  Alcohol history: Denies  Illicit drug history: Denies        Objective:       Physical Exam:   Visit Vitals  /85   Pulse (!) 102   Temp 98.4 °F (36.9 °C)   Resp 16   Ht 5' 3\" (1.6 m)   Wt 90.7 kg (200 lb)   SpO2 95%   BMI 35.43 kg/m²     General appearance: alert, cooperative, no distress, appears stated age  Lungs: clear to auscultation bilaterally  Heart: regular rate and rhythm, S1, S2 normal, no murmur, click, rub or gallop  Abdomen: soft, non-tender. Bowel sounds normal. No masses,  no organomegaly extremities  Extremities: Paraplegic. Generalized weakness noted to bilateral upper extremities. Pulses: 2+ and symmetric  Skin: Skin color, texture, turgor normal. No rashes or lesions  Neurologic: Grossly normal  Cap refill: Brisk, less than 3 seconds  Pulses: 2+, symmetric in all extremities       Data Review: All diagnostic labs and studies have been reviewed. Assessment:     Active Problems:    Multiple sclerosis exacerbation ([facility] (2/24/2017)        Plan:     1.   Multiple sclerosis with increased weakness  Patient to be admitted to medical unit  Supportive care  PT OT consult  MRI brain:Unchanged right matter lesions compatible with the patient's diagnosis of  multiple sclerosis  MRI C-spine:1. Diffuse cord signal abnormality from C2 through T2 with slight volume loss. No new lesions or abnormal cord enhancement  Neurology consulted  Case management for placement at discharge  - Continue tizanidine for muscle spasms     Depression  Continue Zoloft  And supportive treatment  - BSMART consulted in ED    UTI  Patient has chronic Leon in place  Continue IV Rocephin  Follow urine culture    Constipation  Chronic,  continue bowel regimen    CODE STATUS: DNR  DVT prophylaxis: SCDs  Patient's next of kin (cousin) and MPOA is Mik Bee ([phone]). Spoke with Mik Bee on phone updated her on patient's condition and plan of care. Questions answered      FUNCTIONAL STATUS PRIOR TO HOSPITALIZATION (including history of recent falls):Bedbound.      Signed By: Kvng Casarez NP     March 26, 2021

## 2021-03-27 NOTE — CONSULTS
INPATIENT NEUROLOGY CONSULTATION  3/27/2021     Consulted by: Bianka Rondon MD        Patient ID:  Lux Suarez  [id number]  77 y.o.  1955    CC: Increasing weakness and incontinence    HPI    Ms. Chad Evans is a 78-year-old woman known to me in the outpatient clinic. I follow her for a long history of multiple sclerosis. I saw her in the office yesterday accompanied by her daytime aide present with her at 10 AM to 3 PM Monday through Friday. I have also been receiving multiple phone calls from therapist indicating to me that she had been declining so I brought her in to the office to be evaluated. Based on evaluation I referred her to the ER to be admitted. Since being admitted there have been no acute changes. She is paraplegic at baseline. MRI brain and C-spine were done looking for evidence of MS exacerbation which are benign. There were no new lesions. Her spirits are better today. Review of Systems   Neurological: Positive for focal weakness and weakness. All other systems reviewed and are negative.       Past Medical History:   Diagnosis Date    Depression     Menopause     Multiple sclerosis ([facility] [address]     Neurological disorder     Multiple sclerosis, in remission     Other ill-defined conditions(799.89)     Mitral Value Prolapse     Family History   Problem Relation Age of Onset    Breast Cancer Mother         52's     Social History     Socioeconomic History    Marital status: SINGLE     Spouse name: Not on file    Number of children: Not on file    Years of education: Not on file    Highest education level: Not on file   Occupational History    Not on file   Social Needs    Financial resource strain: Not on file    Food insecurity     Worry: Never true     Inability: Never true    Transportation needs     Medical: No     Non-medical: No   Tobacco Use    Smoking status: Former Smoker     Quit date:      Years since quittin.2    Smokeless tobacco: Never Used Substance and Sexual Activity    Alcohol use:  Yes     Alcohol/week: 3.0 standard drinks     Types: 3 Shots of liquor per week     Comment: 3 x week    Drug use: Yes     Frequency: 1.0 times per week     Types: Marijuana     Comment: occasional marijuana    Sexual activity: Not Currently   Lifestyle    Physical activity     Days per week: Not on file     Minutes per session: Not on file    Stress: Not on file   Relationships    Social connections     Talks on phone: Not on file     Gets together: Not on file     Attends Gnosticist service: Not on file     Active member of club or organization: Not on file     Attends meetings of clubs or organizations: Not on file     Relationship status: Not on file    Intimate partner violence     Fear of current or ex partner: Not on file     Emotionally abused: Not on file     Physically abused: Not on file     Forced sexual activity: Not on file   Other Topics Concern     Service Not Asked    Blood Transfusions Not Asked    Caffeine Concern Not Asked    Occupational Exposure Not Asked    Hobby Hazards Not Asked    Sleep Concern Not Asked    Stress Concern Not Asked    Weight Concern Not Asked    Special Diet Not Asked    Back Care Not Asked    Exercise Not Asked    Bike Helmet Not Asked    Seat Belt Not Asked    Self-Exams Not Asked   Social History Narrative    Not on file     Current Facility-Administered Medications   Medication Dose Route Frequency    cefTRIAXone (ROCEPHIN) 1 g in 0.9% sodium chloride (MBP/ADV) 50 mL MBP  1 g IntraVENous Q24H    bisacodyL (DULCOLAX) suppository 10 mg  10 mg Rectal DAILY PRN    ondansetron (ZOFRAN) injection 4 mg  4 mg IntraVENous Q4H PRN    acetaminophen (TYLENOL) tablet 650 mg  650 mg Oral Q4H PRN    docusate sodium (COLACE) capsule 100 mg  100 mg Oral DAILY    polyethylene glycol (MIRALAX) packet 17 g  17 g Oral DAILY    tiZANidine (ZANAFLEX) tablet 4 mg  4 mg Oral Q8H PRN    sertraline (ZOLOFT) tablet 50 mg  50 mg Oral QPM    diphenhydrAMINE (BENADRYL) capsule 25 mg  25 mg Oral Q6H PRN     No Known Allergies    Visit Vitals  /75 (BP 1 Location: Left upper arm)   Pulse 72   Temp 98.7 °F (37.1 °C)   Resp 18   Ht 5' 3\" (1.6 m)   Wt 200 lb (90.7 kg)   SpO2 96%   BMI 35.43 kg/m²     Physical Exam   Constitutional: She appears well-developed and well-nourished. Cardiovascular: Normal rate. Vitals reviewed. Neurologic Exam     Mental Status   Very pleasant woman age-appropriate in bed awake alert smiling. Pupils are equal, EOMI  Face is grossly symmetric on smile tongue is midline speech is clear  She has global weakness of all extremities worse in the legs. She is bedbound. No abnormal movements  Increased tone throughout  Gait deferred due to condition            Lab Results   Component Value Date/Time    WBC 8.5 03/27/2021 06:17 AM    HGB 12.9 03/27/2021 06:17 AM    HCT 40.4 03/27/2021 06:17 AM    PLATELET 012 37/24/7827 06:17 AM    MCV 81.5 03/27/2021 06:17 AM     Lab Results   Component Value Date/Time    Hemoglobin A1c 5.6 07/19/2016 03:51 PM    Glucose 80 03/27/2021 06:17 AM    LDL, calculated 153 (H) 10/10/2018 02:06 PM    Creatinine 0.46 (L) 03/27/2021 06:17 AM      Lab Results   Component Value Date/Time    Cholesterol, total 247 (H) 10/10/2018 02:06 PM    HDL Cholesterol 53 10/10/2018 02:06 PM    LDL, calculated 153 (H) 10/10/2018 02:06 PM    Triglyceride 203 (H) 10/10/2018 02:06 PM     Lab Results   Component Value Date/Time    ALT (SGPT) 27 03/26/2021 02:53 PM    Alk. phosphatase 74 03/26/2021 02:53 PM    Bilirubin, total 0.2 03/26/2021 02:53 PM    Albumin 4.0 03/26/2021 02:53 PM    Protein, total 8.9 (H) 03/26/2021 02:53 PM    INR 1.1 02/24/2017 02:14 PM    Prothrombin time 11.0 02/24/2017 02:14 PM    PLATELET 334 05/41/0588 06:17 AM        CT Results (maximum last 3):   Results from [place] encounter on 05/19/19   CT HEAD WO CONT    Narrative EXAM: CT HEAD WO CONT    INDICATION: fall, head injury. Left periorbital soft tissue swelling. COMPARISON: 2/24/2017. CONTRAST: None. TECHNIQUE: Unenhanced CT of the head was performed using 5 mm images. Brain and  bone windows were generated. CT dose reduction was achieved through use of a  standardized protocol tailored for this examination and automatic exposure  control for dose modulation. FINDINGS:  The ventricles and sulci are stable in size, shape and configuration and  midline. There is moderate persistent periventricular white matter hypodensity. .  There is no intracranial hemorrhage, extra-axial collection, mass, mass effect  or midline shift. The basilar cisterns are open. No acute infarct is  identified. The bone windows demonstrate no abnormalities. The visualized  portions of the paranasal sinuses and mastoid air cells are clear. Impression IMPRESSION:    Stable underlying white matter disease. No acute intracranial hemorrhage  identified     Results from Hospital Encounter encounter on 04/28/17   CTA CHEST W OR W WO CONT    Narrative EXAM: CT ANGIOGRAPHY CHEST     INDICATION:  Shortness of breath produced by exertion or stress; r/o PE    COMPARISON: None. Brayan Neumann CONTRAST: 80 mL of Isovue-370. TECHNIQUE:   Precontrast  images were obtained to localize the volume for acquisition. Multislice helical CT arteriography was performed from the diaphragm to the  thoracic inlet during uneventful rapid bolus intravenous contrast  administration. Lung and soft tissue windows were generated. Coronal and  sagittal  reformatted images were also generated. 3D post processing consisting  of coronal maximum intensity projection images was performed. CT dose reduction  was achieved through use of a standardized protocol tailored for this  examination and automatic exposure control for dose modulation. FINDINGS:  The lungs are clear of mass, nodule, airspace disease or edema.      The pulmonary arteries are well enhanced and no pulmonary emboli are identified. There is no mediastinal or hilar adenopathy or mass. The aorta enhances normally  without evidence of aneurysm or dissection. The visualized portions of the upper abdominal organs are normal.      Impression IMPRESSION:  1. No CT evidence for central pulmonary embolus at this time . MRI Results (maximum last 3): Results from [place] encounter on 03/26/21   MRI CERV SPINE W WO CONT    Narrative EXAM: MRI CERV SPINE W WO CONT    INDICATION: MS flare/ increased weakness. Exam:  MRI cervical spine. Comparisons:  7/20/2020    Sequences:  Sagittal T1, T2, and STIR. Axial T1, T2. After the intravenous  administration of 20 mL of Dotarem sagittal and axial T1-weighted images were  obtained . There is motion artifact    FINDINGS:  Alignment is normal.  Multilevel endplate degenerative change. Again  demonstrated are multifocal areas of increased signal within the cord this  extends from C2 through T2. There is slight volume loss within the cord. No new  lesions or cord signal abnormality. .  Paraspinous soft tissues are within normal  limits. Craniocervical junction: Intact. Without stenosis    C2-C3: No stenosis    C3-C4: Small disc osteophytic bulge with uncovertebral degenerative change  asymmetric to the left. Mild canal with moderate to severe left and moderate to  moderate right foraminal narrowing. C4-C5: Small disc osteophytic bulge with uncovertebral degenerative change  asymmetric to the left. Mild narrowing the canal with moderate to severe left  foraminal narrowing. Moderate right foraminal narrowing. No change    C5-C6: Diffuse disc osteophytic bulge and uncovertebral degenerative change  asymmetric to the left with mild to moderate narrowing of the canal and moderate  left greater than right foraminal narrowing, no change    C6-C7: Diffuse disc osteophytic bulge and uncovertebral degenerative change.   Mild narrowing of the canal and moderate foraminal narrowing unchanged    Examination of the upper thoracic spine demonstrates no significant stenosis. Impression 1. Diffuse cord signal abnormality from C2 through T2 with slight volume loss. No new lesions or abnormal cord enhancement   MRI BRAIN W WO CONT    Narrative EXAM:  MRI BRAIN W WO CONT    INDICATION:    MS flare/ increased weakness    COMPARISON:  7/20/2022. CONTRAST: 20 ml Dotarem. TECHNIQUE:    Multiplanar multisequence acquisition without and with contrast of the brain. FINDINGS:  Ventricles are enlarged compatible with the overall degree of volume loss. Confluent subcortical and periventricular white matter signal abnormality  changes are again noted. This has not progressed in the interval given motion no  abnormal enhancement There is no acute infarct, hemorrhage, extra-axial fluid  collection, or mass effect. There is no cerebellar tonsillar herniation. Expected arterial flow-voids are present. The paranasal sinuses, mastoid air cells, and middle ears are clear. The orbital  contents are within normal limits. No significant osseous or scalp lesions are  identified. Impression Unchanged right matter lesions compatible with the patient's diagnosis of  multiple sclerosis   Results from [place] encounter on 07/20/20   MRI CERV SPINE WO CONT    Narrative EXAM:  TEMPORARY  INDICATION:  Multiple sclerosis, G 35, generalized weakness, R 53.1, interval  imaging, initially diagnosed MS in 1979. Unable to walk for last 2 years, unable  to empty bladder last 12 months. TECHNIQUE: Sagittal T1, T2, STIR and axial T1 and T2-weighted images of the  cervical spine were obtained. Sequences were adjusted/repeated for mild motion  artifact. COMPARISON: MRI cervical spine 3/20/19, 2/25/17 and 6/13/16  FINDINGS:  Foci of abnormal T2 hyperintensity again demonstrated and best delineated on  today's sequence 8.   Foci of T2 hyperintensity in the left posterior cord at the bottom of C2. Generalized areas of T2 hyperintensity greater on the left at C3-4-5 and then  somewhat diffuse centrally down through C7. This is been variably present on prior exams and respiratory and other artifacts  have limited detail. There has been no definitive interval change since 3/20/19,  however there does may have been progression in the mid cervical cord disease  when correlating with prior MRIs in 2017 and 2016. The craniocervical junction remains relatively unremarkable. Again demonstrated are chronic findings of cervical spondylosis C3-C7 with mild  stenosis and no cord compression. Left greater than right facet arthrosis upper  cervical spine. Impression IMPRESSION:  1. Multi focal T2 hyperintensity in cervical spinal cord similar to 3/20/19. Possible progression when compared to prior MRIs 2016 and 2017. Respiratory/motion artifact makes exact comparison challenging. 2. Stable chronic spondylosis and some facet arthrosis C3-C7 without cord  compression. VAS/US/Carotid Doppler Results (maximum last 3): No results found for this or any previous visit. PET Results (maximum last 3): No results found for this or any previous visit. Assessment and Plan        53-year-old woman who is paraplegic due to advanced multiple sclerosis which currently is calm. There are no new lesions and no evidence of active disease I would defer steroids. Unfortunately she has declined such that I do not believe she can live independently any longer and she does recognize that. Case management will be engaged to help with ultimate placement. She is bedbound and cannot live alone. Defer any MS meds right now. I would like her to reestablish a new residence before we begin that conversation. We have had difficulty initiating medications due to her living situation as far as administration and obtaining the medication. We will follow peripherally. Please call if needed. This clinical note was dictated with an electronic dictation software that can make unintentional errors. If there are any questions, please contact me directly for clarification.       [address] [facility], DO  NEUROLOGIST  Diplomate CHANTALN  3/27/2021

## 2021-03-27 NOTE — PROGRESS NOTES
[address] [facility]  Hospitalist Group                                                                                          Hospitalist Progress Note  Grant Gardner NP  Answering service: [phone] OR [phone] from in house phone        Date of Service:  3/27/2021  NAME:  Piedad Virk  :  1955  MRN:  [id number]      Admission Summary:   Per H&P: Piedad Virk is a 77 y.o. female with a past medical history of multiple sclerosis, depression, and mitral valve prolapse that presented to the ER with complaints of increasing weakness. Patient was seen in neurologist office this morning, Dr. Mehran Jo who advised patient to come to the emergency department for admission. Dr. Mehran Jo is recommending MRI of the brain and C-spine to rule out an acute MS flare, as patient symptoms are progressively getting worse. Patient states that over the past couple months she has become progressively more weak and she feels that she needs more help at home but due to lack of family support she is unable to get the help. She currently has 1 aide that comes in for 5 to 6 hours a day. Past medical history taken from patient and chart. Patient has received first dose Moderna COVID-19 vaccination. She is unsure of when her second dose is scheduled. Patient denies any syncope, dizziness, shortness of breath, chest pain or tightness, nausea, vomiting, or diarrhea  Interval history / Subjective:    Seen and examined patient sitting up in bed. States that she is feeling good today. Had one large bowel movement this am.   No new complaints. No overnight events noted. Assessment & Plan:      Multiple sclerosis with increased weakness  Patient to be admitted to medical unit  Supportive care  PT OT consult  MRI brain:Unchanged right matter lesions compatible with the patient's diagnosis of  multiple sclerosis  MRI C-spine:1. Diffuse cord signal abnormality from C2 through T2 with slight volume loss.   No new lesions or abnormal cord enhancement  Neurology following   Case management for placement at discharge  - Continue tizanidine for muscle spasms      Depression  Continue Zoloft  And supportive treatment  - BSMART consulted in ED     UTI  Patient has chronic Leon in place  Continue IV Rocephin  Follow urine culture: preliminary growing gram negative rods.     Constipation  Chronic,  continue bowel regimen  - Had one large bowel movement this am      Code status: DNR  DVT prophylaxis: SCDs    Care Plan discussed with: Patient/Family, Nurse and   Anticipated Disposition: SNF/LTC  Anticipated Discharge: 24 hours to 48 hours     Hospital Problems  Date Reviewed: 5/15/2020          Codes Class Noted POA    Multiple sclerosis exacerbation ([facility] [address] ICD-10-CM: G35  ICD-9-CM: 167  2/24/2017 Unknown                Review of Systems:   A comprehensive review of systems was negative except for that written in the HPI. Vital Signs:    Last 24hrs VS reviewed since prior progress note. Most recent are:  Visit Vitals  /81 (BP 1 Location: Left upper arm)   Pulse 97   Temp 98 °F (36.7 °C)   Resp 16   Ht 5' 3\" (1.6 m)   Wt 90.7 kg (200 lb)   SpO2 97%   BMI 35.43 kg/m²       No intake or output data in the 24 hours ending 03/27/21 1559     Physical Examination:             Constitutional:  No acute distress, cooperative, pleasant, answers questions   ENT:  Oral mucosa moist, oropharynx benign. Resp:  CTA bilaterally. No wheezing/rhonchi/rales. No accessory muscle use   CV:  Regular rhythm, normal rate, no murmurs, gallops, rubs    GI:  Soft, non distended, non tender. normoactive bowel sounds,    Musculoskeletal:  No edema, warm, 2+ pulses throughout    Neurologic:  Moves upper extremities with generalized weakness, paraplegic. Tana Mace AAOx3, CN II-XII reviewed, follows commands     Psych:  Good insight, Not anxious nor agitated.        Data Review:    Review and/or order of clinical lab test  Review and/or order of tests in the radiology section of CPT  Review and/or order of tests in the medicine section of [facility]      Labs:     Recent Labs     03/27/21  0617 03/26/21  1453   WBC 8.5 11.5*   HGB 12.9 14.2   HCT 40.4 45.2    391     Recent Labs     03/27/21  0617 03/26/21  1453    138   K 3.8 3.5    104   CO2 28 28   BUN 10 10   CREA 0.46* 0.56   GLU 80 87   CA 9.0 9.6   MG  --  2.2     Recent Labs     03/26/21  1453   ALT 27   AP 74   TBILI 0.2   TP 8.9*   ALB 4.0   GLOB 4.9*     No results for input(s): INR, PTP, APTT, INREXT in the last 72 hours. No results for input(s): FE, TIBC, PSAT, FERR in the last 72 hours. Lab Results   Component Value Date/Time    Folate 11.9 07/06/2020 11:13 AM      No results for input(s): PH, PCO2, PO2 in the last 72 hours.   Recent Labs     03/26/21  1453   TROIQ <0.05     Lab Results   Component Value Date/Time    Cholesterol, total 247 (H) 10/10/2018 02:06 PM    HDL Cholesterol 53 10/10/2018 02:06 PM    LDL, calculated 153 (H) 10/10/2018 02:06 PM    Triglyceride 203 (H) 10/10/2018 02:06 PM     No results found for: [facility]  Lab Results   Component Value Date/Time    Color YELLOW/STRAW 03/26/2021 03:21 PM    Appearance CLEAR 03/26/2021 03:21 PM    Specific gravity 1.022 03/26/2021 03:21 PM    pH (UA) 5.0 03/26/2021 03:21 PM    Protein 30 (A) 03/26/2021 03:21 PM    Glucose Negative 03/26/2021 03:21 PM    Ketone Negative 03/26/2021 03:21 PM    Bilirubin Negative 03/26/2021 03:21 PM    Urobilinogen 0.2 03/26/2021 03:21 PM    Nitrites Positive (A) 03/26/2021 03:21 PM    Leukocyte Esterase MODERATE (A) 03/26/2021 03:21 PM    Epithelial cells FEW 03/26/2021 03:21 PM    Bacteria Negative 03/26/2021 03:21 PM    WBC 5-10 03/26/2021 03:21 PM    RBC 0-5 03/26/2021 03:21 PM         Medications Reviewed:     Current Facility-Administered Medications   Medication Dose Route Frequency    cefTRIAXone (ROCEPHIN) 1 g in 0.9% sodium chloride (MBP/ADV) 50 mL MBP  1 g IntraVENous Q24H  bisacodyL (DULCOLAX) suppository 10 mg  10 mg Rectal DAILY PRN    ondansetron (ZOFRAN) injection 4 mg  4 mg IntraVENous Q4H PRN    acetaminophen (TYLENOL) tablet 650 mg  650 mg Oral Q4H PRN    docusate sodium (COLACE) capsule 100 mg  100 mg Oral DAILY    polyethylene glycol (MIRALAX) packet 17 g  17 g Oral DAILY    tiZANidine (ZANAFLEX) tablet 4 mg  4 mg Oral Q8H PRN    sertraline (ZOLOFT) tablet 50 mg  50 mg Oral QPM    diphenhydrAMINE (BENADRYL) capsule 25 mg  25 mg Oral Q6H PRN     ______________________________________________________________________  EXPECTED LENGTH OF STAY: - - -  ACTUAL LENGTH OF STAY:          1                 Rubi Barrow NP

## 2021-03-27 NOTE — ACP (ADVANCE CARE PLANNING)
Advance Care Planning     Advance Care Planning (ACP) Physician/NP/PA Conversation      Date of Conversation: 3/26/2021  Conducted with: Patient with Decision Making Capacity    Healthcare Decision Maker: mPOA: Greyson Anupama [place] [phone]     Click here to complete [address] including selection of the Healthcare Decision Maker Relationship (ie \"Primary\")  Today we documented Decision Maker(s). The patient will provide ACP documents. Care Preferences:    Hospitalization: \"If your health worsens and it becomes clear that your chance of recovery is unlikely, what would be your preference regarding hospitalization? \"  The patient would prefer hospitalization. Ventilation: \"If you were unable to breathe on your own and your chance of recovery was unlikely, what would be your preference about the use of a ventilator (breathing machine) if it was available to you? \"   The patient would desire the use of a ventilator. Resuscitation: \"In the event your heart stopped as a result of an underlying serious health condition, would you want attempts to be made to restart your heart, or would you prefer a natural death? \"   No, do NOT attempt to resuscitate.       Conversation Outcomes / Follow-Up Plan:   ACP complete - no further action today  Reviewed DNR/DNI and patient elects DNR order - completed portable DNR form & placed order     Length of Voluntary ACP Conversation in minutes:  25 minutes    Miriam Vieyra NP

## 2021-03-27 NOTE — PROGRESS NOTES
Problem: Self Care Deficits Care Plan (Adult)  Goal: *Acute Goals and Plan of Care (Insert Text)  Description:   FUNCTIONAL STATUS PRIOR TO ADMISSION: The patient lives alone in an apartment, uses wheelchair for mobility, and has care aides 5x/week from 10 am - 3 pm. Patient reports using a ballet bar to assist in bed>wheelchair transfer with additional assistance of aide needed (infer mod). Patient reports that she tries to \"get everything I need to do done by 3 and then I get back in bed before she leaves and I sleep until they come back at 10 the next day. \" Patient reports set-up for grooming ADLs, wearing diaper and bed-level BM hygiene with assistance. Patient would like to be able to get to a toilet but has been unable to for several months. HOME SUPPORT: Lives alone with friendly neighbors. Has care aides come to the home 5x/week. Occupational Therapy Goals  Initiated 3/27/2021  1. Patient will perform EOB grooming with supervision/set-up within 7 day(s). 2.  Patient will perform lower body dressing at bed-level with minimal assistance within 7 day(s). 3.  Patient will perform toilet transfers to [facility] with minimal assistance and sliding board within 7 day(s). 4.  Patient will perform all aspects of toileting with moderate assistance within 7 day(s). 5.  Patient will participate in upper extremity therapeutic exercise/activities with supervision/set-up for 15 minutes within 7 day(s).     Outcome: Progressing Towards Goal   OCCUPATIONAL THERAPY EVALUATION  Patient: Piedad Virk (73 y.o. female)  Date: 3/27/2021  Primary Diagnosis: Multiple sclerosis exacerbation (HCC) [G35]        Precautions:        ASSESSMENT  Based on the objective data described below, the patient presents with impaired functional mobility and balance, global weakness (LEs>UEs), decreased functional reach to lower-body, near dependence with lower body tasks, and overall increased anxiety s/p admission for suspected MS exacerbation. Per patient, lives alone and has care aides from 10am-3pm, Mon-Fri. Reports recently unable to transfer to/from wheelchair by herself, has been staying in bed whenever aides are not there, and using a diaper from 3pm-10am for Bms. Patient expressing desire for increased independence and ability to use toilet for BM. This date, patient requiring overall mod assist for bed mobility. Patient with significant fear of falling with tendency for posterior lean, requiring repeated cues for forward weight-shifting. Provided patient education and training on use of sliding board for functional transfer to drop-arm BSC. Patient requiring overall mod assist x2 for lateral transfer. Total assist needed for BM hygiene and clothing management. Patient benefits from frequent encouragement, consistent explanation for next step, and cues for redirection to task/goal 2/2 anxiety. Anticipate patient would benefit from SNF-level rehab to maximize functional gains - suspect improved sliding board transfer and better ability to mobilize and use BSC would greatly improve quality of life. Patient may ultimately benefit from transition to LTC setting as she needs 24/7 supervision and assist for best self-care/hygiene (ie maximize strength/endurance, promote skin integrity, and improve quality of life). Current Level of Function Impacting Discharge (ADLs/self-care): up to total A; mod A x2 for sliding board transfer    Functional Outcome Measure: The patient scored 30/100 on the Barthel Index. Other factors to consider for discharge:      Patient will benefit from skilled therapy intervention to address the above noted impairments.        PLAN :  Recommendations and Planned Interventions: self care training, functional mobility training, therapeutic exercise, balance training, therapeutic activities, endurance activities, patient education, and home safety training    Frequency/Duration: Patient will be followed by occupational therapy 5 times a week to address goals. Recommendation for discharge: (in order for the patient to meet his/her long term goals)  Therapy up to 5 days/week in SNF setting    This discharge recommendation:  Has not yet been discussed the attending provider and/or case management    IF patient discharges home will need the following DME: sliding board + drop-arm BSC pending continued training and availability of assistance; needs 24/7 care       SUBJECTIVE:   Patient stated I feel like you did all the work.     OBJECTIVE DATA SUMMARY:   HISTORY:   Past Medical History:   Diagnosis Date    Depression     Menopause     Multiple sclerosis ([facility] [address]     Neurological disorder     Multiple sclerosis, in remission 2011    Other ill-defined conditions(799.89)     Mitral Value Prolapse     Past Surgical History:   Procedure Laterality Date    HX HEENT      HX HYSTERECTOMY      IR ASP BLADDER SUPRA CATH  6/30/2020    AL ABDOMEN SURGERY PROC UNLISTED      hernia repair       Expanded or extensive additional review of patient history:     Home Situation  Home Environment: Apartment  Wheelchair Ramp: Yes  Living Alone: Yes  Support Systems: Friends \ neighbors, Home care staff  Current DME Used/Available at Home: Wheelchair, power, Wheelchair, Fleurette Amble, rolling(ballet bar to assist with bed>w/c)    Hand dominance: Right    EXAMINATION OF PERFORMANCE DEFICITS:  Cognitive/Behavioral Status:  Neurologic State: Alert  Orientation Level: Oriented X4  Cognition: Follows commands  Perception: Appears intact  Perseveration: No perseveration noted  Safety/Judgement: Awareness of environment; Fall prevention    Vision/Perceptual:    Acuity: Within Defined Limits    Corrective Lenses: Glasses    Range of Motion:  AROM: Grossly decreased, non-functional(BLE, generally decreased for UE)  PROM: Grossly decreased, non-functional(BLE, generally decreased BUE)    Strength:  Strength: Grossly decreased, non-functional(BLE, generally decreased BUE)    Coordination:  Coordination: Generally decreased, functional  Fine Motor Skills-Upper: Left Intact; Right Intact    Gross Motor Skills-Upper: Left Intact; Right Intact    Tone & Sensation:  Tone: Abnormal(rigid RLE, hypotonic LLE)    Balance:  Sitting: Impaired  Sitting - Static: Fair (occasional)  Sitting - Dynamic: Fair (occasional)  Standing: (unable to stand)    Functional Mobility and Transfers for ADLs:  Bed Mobility:  Rolling: Moderate assistance;Minimum assistance  Supine to Sit: Minimum assistance; Moderate assistance  Sit to Supine: Moderate assistance;Assist x1  Scooting: Minimum assistance;Assist x1    Transfers: Toilet Transfer : Moderate assistance;Assist x2(using sliding board to drop-arm [facility])    ADL Assessment:  Feeding: Setup    Oral Facial Hygiene/Grooming: Setup    Upper Body Dressing: Minimum assistance    Lower Body Dressing: Total assistance    Toileting: Total assistance    ADL Intervention and task modifications:  Feeding  Feeding Assistance: Set-up  Container Management: Supervision  Food to Mouth: Supervision  Drink to Mouth: Supervision    Lower Body Dressing Assistance  Dressing Assistance: Total assistance(dependent)  Socks: Total assistance (dependent)    Toileting  Toileting Assistance: Total assistance(dependent)  Bowel Hygiene: Total assistance (dependent)  Clothing Management: Total assistance (dependent)  Cues: Verbal cues provided;Visual cues provided;Physical assistance for pants down    Cognitive Retraining  Safety/Judgement: Awareness of environment; Fall prevention      Functional Measure:  Barthel Index:    Bathin  Bladder: 0  Bowels: 5  Groomin  Dressin  Feeding: 10  Mobility: 0  Stairs: 0  Toilet Use: 5  Transfer (Bed to Chair and Back): 5  Total: 30/100        The Barthel ADL Index: Guidelines  1. The index should be used as a record of what a patient does, not as a record of what a patient could do.   2. The main aim is to establish degree of independence from any help, physical or verbal, however minor and for whatever reason. 3. The need for supervision renders the patient not independent. 4. A patient's performance should be established using the best available evidence. Asking the patient, friends/relatives and nurses are the usual sources, but direct observation and common sense are also important. However direct testing is not needed. 5. Usually the patient's performance over the preceding 24-48 hours is important, but occasionally longer periods will be relevant. 6. Middle categories imply that the patient supplies over 50 per cent of the effort. 7. Use of aids to be independent is allowed. Jimmie Pham., Barthel, DLeeeleW. (4349). Functional evaluation: the Barthel Index. [address] (14)2. NEHAL Son, Fadia Wilder., Merline Sexton., Di, [address] [place] [address] (1999). Measuring the change indisability after inpatient rehabilitation; comparison of the responsiveness of the Barthel Index and Functional [place] Measure. Journal of Neurology, Neurosurgery, and Psychiatry, 66(4), 584-170. Jessie Vasquez, N.J.A, DONNIE Mars, & Reinaldo Das M.A. (2004.) Assessment of post-stroke quality of life in cost-effectiveness studies: The usefulness of the Barthel Index and the EuroQoL-5D. Quality of Life Research, 15, 719-89         Occupational Therapy Evaluation Charge Determination   History Examination Decision-Making   LOW Complexity : Brief history review  HIGH Complexity : 5 or more performance deficits relating to physical, cognitive , or psychosocial skils that result in activity limitations and / or participation restrictions HIGH Complexity : Patient presents with comorbidities that affect occupational performance.  Signifigant modification of tasks or assistance (eg, physical or verbal) with assessment (s) is necessary to enable patient to complete evaluation       Based on the above components, the patient evaluation is determined to be of the following complexity level: LOW   Pain Rating:  No reports. Activity Tolerance:   Good and requires rest breaks    After treatment patient left in no apparent distress:    Supine in bed, Call bell within reach, and Side rails x 3    COMMUNICATION/EDUCATION:   The patients plan of care was discussed with: Physical therapist and Registered nurse. Home safety education was provided and the patient/caregiver indicated understanding., Patient/family have participated as able in goal setting and plan of care. , and Patient/family agree to work toward stated goals and plan of care. This patients plan of care is appropriate for delegation to [facility].     Thank you for this referral.  Troy Dubon, OT  Time Calculation: 60 mins

## 2021-03-27 NOTE — PROGRESS NOTES
Occupational Therapy    Orders received, chart reviewed and patient evaluated by occupational therapy. Pending progression with skilled acute occupational therapy, recommend:  Therapy up to 5 days/week in SNF setting     Recommend with nursing ADLs with supervision/setup, OOB to chair 3x/day and toileting via bedpan and encourage active ADL participation w/ HOB elevated. Thank you for completing as able in order to maintain patient strength, endurance and independence. Full evaluation to follow.      Zeinab Sotomayor OTR/L

## 2021-03-28 PROBLEM — G35 MULTIPLE SCLEROSIS (HCC): Status: ACTIVE | Noted: 2021-03-28

## 2021-03-28 LAB
BACTERIA SPEC CULT: NORMAL
BACTERIA SPEC CULT: NORMAL
CC UR VC: NORMAL
SERVICE CMNT-IMP: NORMAL

## 2021-03-28 PROCEDURE — 65390000012 HC CONDITION CODE 44 OBSERVATION

## 2021-03-28 PROCEDURE — 74011000258 HC RX REV CODE- 258: Performed by: NURSE PRACTITIONER

## 2021-03-28 PROCEDURE — 74011250637 HC RX REV CODE- 250/637: Performed by: NURSE PRACTITIONER

## 2021-03-28 PROCEDURE — 96376 TX/PRO/DX INJ SAME DRUG ADON: CPT

## 2021-03-28 PROCEDURE — 74011250636 HC RX REV CODE- 250/636: Performed by: NURSE PRACTITIONER

## 2021-03-28 PROCEDURE — 3331090001 HH PPS REVENUE CREDIT

## 2021-03-28 PROCEDURE — 99218 HC RM OBSERVATION: CPT

## 2021-03-28 PROCEDURE — 3331090002 HH PPS REVENUE DEBIT

## 2021-03-28 RX ADMIN — TIZANIDINE 4 MG: 2 TABLET ORAL at 20:04

## 2021-03-28 RX ADMIN — DOCUSATE SODIUM 100 MG: 100 CAPSULE, LIQUID FILLED ORAL at 09:20

## 2021-03-28 RX ADMIN — POLYETHYLENE GLYCOL 3350 17 G: 17 POWDER, FOR SOLUTION ORAL at 09:20

## 2021-03-28 RX ADMIN — DIPHENHYDRAMINE HYDROCHLORIDE 50 MG: 25 CAPSULE ORAL at 22:15

## 2021-03-28 RX ADMIN — CEFTRIAXONE SODIUM 1 G: 1 INJECTION, POWDER, FOR SOLUTION INTRAMUSCULAR; INTRAVENOUS at 16:08

## 2021-03-28 RX ADMIN — SERTRALINE 50 MG: 50 TABLET, FILM COATED ORAL at 17:45

## 2021-03-28 NOTE — PROGRESS NOTES
[facility]ist [facility]                                                                                          Hospitalist Progress Note  Diana Rico NP  Answering service: [phone] from in house phone        Date of Service:  3/28/2021  NAME:  Alfie Pinedo  :  1955  MRN:  [id number]      Admission Summary:   Per H&P: Darlin Lancaster is a 77 y.o. female with a past medical history of multiple sclerosis, depression, and mitral valve prolapse that presented to the ER with complaints of increasing weakness.  Patient was seen in neurologist office this morning, Dr. Steven Xavier who advised patient to come to the emergency department for admission.  Dr. Steven Xavier is recommending MRI of the brain and C-spine to rule out an acute MS flare, as patient symptoms are progressively getting worse. Patient states that over the past couple months she has become progressively more weak and she feels that she needs more help at home but due to lack of family support she is unable to get the help.  She currently has 1 aide that comes in for 5 to 6 hours a day. Past medical history taken from patient and chart. Lucila Thomas has received first dose Moderna COVID-19 vaccination. Geeta Jackson is unsure of when her second dose is scheduled. Patient denies any syncope, dizziness, shortness of breath, chest pain or tightness, nausea, vomiting, or diarrhea    Interval history / Subjective:   Seen and examined patient. States that she's feeling good today and had a good night of sleep. No new complaints. No overnight events noted. Denies pain. Assessment & Plan:     Multiple sclerosis with increased weakness  Patient to be admitted to [facility]  Supportive care  PT OT consult  MRI brain:Unchanged right matter lesions compatible with the patient's diagnosis of  multiple sclerosis  MRI C-spine:1. Diffuse cord signal abnormality from C2 through T2 with slight volume loss.   No new lesions or abnormal cord enhancement  Neurology following   Case management for placement at discharge  - Continue tizanidine for muscle spasms      Depression  Continue Zoloft  And supportive treatment  - BSMART consulted in ED     UTI  Patient has chronic Leon in place  Continue IV Rocephin  Follow urine culture: preliminary growing gram negative rods.     Constipation  Chronic,  continue bowel regimen  - Had one large bowel movement this am        Code status: DNR  DVT prophylaxis: SCDs    Care Plan discussed with: Patient/Family and Nurse  Anticipated Disposition: SNF/LTC  Anticipated Discharge: 24 hours to 48 hours     Hospital Problems  Date Reviewed: 5/15/2020          Codes Class Noted POA    Multiple sclerosis ([facility] [address] ICD-10-CM: G35  ICD-9-CM: 010  3/28/2021 Unknown        Multiple sclerosis exacerbation ([facility] [address] ICD-10-CM: G35  ICD-9-CM: 675  2/24/2017 Unknown                Review of Systems:   A comprehensive review of systems was negative except for that written in the HPI. Vital Signs:    Last 24hrs VS reviewed since prior progress note. Most recent are:  Visit Vitals  /70   Pulse 90   Temp 98.6 °F (37 °C)   Resp 16   Ht 5' 3\" (1.6 m)   Wt 90.7 kg (200 lb)   SpO2 98%   BMI 35.43 kg/m²         Intake/Output Summary (Last 24 hours) at 3/28/2021 1014  Last data filed at 3/28/2021 9343  Gross per 24 hour   Intake    Output 1000 ml   Net -1000 ml        Physical Examination:             Constitutional:  No acute distress, cooperative, pleasant, answers questions   ENT:  Oral mucosa moist, oropharynx benign. Resp:  CTA bilaterally. No wheezing/rhonchi/rales. No accessory muscle use   CV:  Regular rhythm, normal rate, no murmurs, gallops, rubs    GI:  Soft, non distended, non tender. normoactive bowel sounds     Musculoskeletal:  No edema, warm, 2+ pulses throughout    Neurologic:  Moves upper extremities with generalized weakness. Paraplegic  .   AAOx3, CN II-XII reviewed     Psych:  Good insight, Not anxious nor agitated. Data Review:    Review and/or order of clinical lab test  Review and/or order of tests in the medicine section of [facility]      Labs:     Recent Labs     03/27/21  0617 03/26/21  1453   WBC 8.5 11.5*   HGB 12.9 14.2   HCT 40.4 45.2    391     Recent Labs     03/27/21  0617 03/26/21  1453    138   K 3.8 3.5    104   CO2 28 28   BUN 10 10   CREA 0.46* 0.56   GLU 80 87   CA 9.0 9.6   MG  --  2.2     Recent Labs     03/26/21  1453   ALT 27   AP 74   TBILI 0.2   TP 8.9*   ALB 4.0   GLOB 4.9*     No results for input(s): INR, PTP, APTT, INREXT in the last 72 hours. No results for input(s): FE, TIBC, PSAT, FERR in the last 72 hours. Lab Results   Component Value Date/Time    Folate 11.9 07/06/2020 11:13 AM      No results for input(s): PH, PCO2, PO2 in the last 72 hours.   Recent Labs     03/26/21  1453   TROIQ <0.05     Lab Results   Component Value Date/Time    Cholesterol, total 247 (H) 10/10/2018 02:06 PM    HDL Cholesterol 53 10/10/2018 02:06 PM    LDL, calculated 153 (H) 10/10/2018 02:06 PM    Triglyceride 203 (H) 10/10/2018 02:06 PM     No results found for: [facility]  Lab Results   Component Value Date/Time    Color YELLOW/STRAW 03/26/2021 03:21 PM    Appearance CLEAR 03/26/2021 03:21 PM    Specific gravity 1.022 03/26/2021 03:21 PM    pH (UA) 5.0 03/26/2021 03:21 PM    Protein 30 (A) 03/26/2021 03:21 PM    Glucose Negative 03/26/2021 03:21 PM    Ketone Negative 03/26/2021 03:21 PM    Bilirubin Negative 03/26/2021 03:21 PM    Urobilinogen 0.2 03/26/2021 03:21 PM    Nitrites Positive (A) 03/26/2021 03:21 PM    Leukocyte Esterase MODERATE (A) 03/26/2021 03:21 PM    Epithelial cells FEW 03/26/2021 03:21 PM    Bacteria Negative 03/26/2021 03:21 PM    WBC 5-10 03/26/2021 03:21 PM    RBC 0-5 03/26/2021 03:21 PM         Medications Reviewed:     Current Facility-Administered Medications   Medication Dose Route Frequency    diphenhydrAMINE (BENADRYL) capsule 50 mg  50 mg Oral Q6H PRN    cefTRIAXone (ROCEPHIN) 1 g in 0.9% sodium chloride (MBP/ADV) 50 mL MBP  1 g IntraVENous Q24H    bisacodyL (DULCOLAX) suppository 10 mg  10 mg Rectal DAILY PRN    ondansetron (ZOFRAN) injection 4 mg  4 mg IntraVENous Q4H PRN    acetaminophen (TYLENOL) tablet 650 mg  650 mg Oral Q4H PRN    docusate sodium (COLACE) capsule 100 mg  100 mg Oral DAILY    polyethylene glycol (MIRALAX) packet 17 g  17 g Oral DAILY    tiZANidine (ZANAFLEX) tablet 4 mg  4 mg Oral Q8H PRN    sertraline (ZOLOFT) tablet 50 mg  50 mg Oral QPM     ______________________________________________________________________  EXPECTED LENGTH OF STAY: - - -  ACTUAL LENGTH OF STAY:          2                 Diana Rico NP

## 2021-03-28 NOTE — PROGRESS NOTES
Transition of Care Plan   RUR- n/a- observation, will require [employer] Authorization    DISPOSITION: Initiated referral though [employer], [place] of Choice was explained to patient. Patient has been at [facility] SNF facility. Patient prefers referral to [facility]. Referral will be sent through cc link. Patient has [address] with paid personal caregivers.  F/U with PCP/Specialist     Transport: [facility]            Patient indicated that Dr. Greyson Altman is a health care decision maker, [phone], patient also explained that she would want this person above to get dc information prior to leaving. This CM called [employer] [phone] to initiate auth. Fax number is [phone].     Pending Auth ID number  [id number]    Kushal Pacheco  1:53 PM

## 2021-03-28 NOTE — PROGRESS NOTES
CM Note:    RUR- N/A      Condition Code 44 letter and State Observation Notice and Medicare Outpatient Observation Notice delivered to patient and/or patient representative. Opportunity to ask questions given.       Rosita Pacheco   10:52 AM

## 2021-03-29 VITALS
DIASTOLIC BLOOD PRESSURE: 76 MMHG | TEMPERATURE: 97.8 F | HEART RATE: 102 BPM | WEIGHT: 200 LBS | BODY MASS INDEX: 35.44 KG/M2 | HEIGHT: 63 IN | SYSTOLIC BLOOD PRESSURE: 112 MMHG | RESPIRATION RATE: 17 BRPM | OXYGEN SATURATION: 96 %

## 2021-03-29 LAB
ANION GAP SERPL CALC-SCNC: 6 MMOL/L (ref 5–15)
APPEARANCE UR: ABNORMAL
BACTERIA UR CULT: ABNORMAL
BACTERIA UR CULT: ABNORMAL
BASOPHILS # BLD: 0 K/UL (ref 0–0.1)
BASOPHILS NFR BLD: 1 % (ref 0–1)
BILIRUB UR QL STRIP: NEGATIVE
BUN SERPL-MCNC: 8 MG/DL (ref 6–20)
BUN/CREAT SERPL: 16 (ref 12–20)
CALCIUM SERPL-MCNC: 8.8 MG/DL (ref 8.5–10.1)
CASTS URNS QL MICRO: ABNORMAL /LPF
CHLORIDE SERPL-SCNC: 108 MMOL/L (ref 97–108)
CO2 SERPL-SCNC: 27 MMOL/L (ref 21–32)
COLOR UR: ABNORMAL
COVID-19 RAPID TEST, COVR: NOT DETECTED
CREAT SERPL-MCNC: 0.51 MG/DL (ref 0.55–1.02)
DIFFERENTIAL METHOD BLD: ABNORMAL
EOSINOPHIL # BLD: 0.1 K/UL (ref 0–0.4)
EOSINOPHIL NFR BLD: 2 % (ref 0–7)
EPI CELLS #/AREA URNS HPF: ABNORMAL /HPF (ref 0–10)
ERYTHROCYTE [DISTWIDTH] IN BLOOD BY AUTOMATED COUNT: 14.4 % (ref 11.5–14.5)
GLUCOSE SERPL-MCNC: 87 MG/DL (ref 65–100)
GLUCOSE UR QL: NEGATIVE
HCT VFR BLD AUTO: 41.3 % (ref 35–47)
HGB BLD-MCNC: 12.6 G/DL (ref 11.5–16)
HGB UR QL STRIP: ABNORMAL
IMM GRANULOCYTES # BLD AUTO: 0 K/UL (ref 0–0.04)
IMM GRANULOCYTES NFR BLD AUTO: 1 % (ref 0–0.5)
KETONES UR QL STRIP: NEGATIVE
LEUKOCYTE ESTERASE UR QL STRIP: ABNORMAL
LYMPHOCYTES # BLD: 2.4 K/UL (ref 0.8–3.5)
LYMPHOCYTES NFR BLD: 28 % (ref 12–49)
MCH RBC QN AUTO: 25.5 PG (ref 26–34)
MCHC RBC AUTO-ENTMCNC: 30.5 G/DL (ref 30–36.5)
MCV RBC AUTO: 83.4 FL (ref 80–99)
MICRO URNS: ABNORMAL
MONOCYTES # BLD: 0.6 K/UL (ref 0–1)
MONOCYTES NFR BLD: 7 % (ref 5–13)
MUCOUS THREADS URNS QL MICRO: ABNORMAL
NEUTS SEG # BLD: 5.2 K/UL (ref 1.8–8)
NEUTS SEG NFR BLD: 61 % (ref 32–75)
NITRITE UR QL STRIP: POSITIVE
NRBC # BLD: 0 K/UL (ref 0–0.01)
NRBC BLD-RTO: 0 PER 100 WBC
PH UR STRIP: 8 [PH] (ref 5–7.5)
PLATELET # BLD AUTO: 350 K/UL (ref 150–400)
PMV BLD AUTO: 10.2 FL (ref 8.9–12.9)
POTASSIUM SERPL-SCNC: 3.8 MMOL/L (ref 3.5–5.1)
PROT UR QL STRIP: ABNORMAL
RBC # BLD AUTO: 4.95 M/UL (ref 3.8–5.2)
RBC #/AREA URNS HPF: >30 /HPF (ref 0–2)
SODIUM SERPL-SCNC: 141 MMOL/L (ref 136–145)
SOURCE, COVRS: NORMAL
SP GR UR: 1.02 (ref 1–1.03)
SPECIMEN/REQUEST PROBLEM, 100867: NORMAL
UROBILINOGEN UR STRIP-MCNC: 1 MG/DL (ref 0.2–1)
WBC # BLD AUTO: 8.4 K/UL (ref 3.6–11)
WBC #/AREA URNS HPF: ABNORMAL /HPF (ref 0–5)

## 2021-03-29 PROCEDURE — 74011250637 HC RX REV CODE- 250/637: Performed by: NURSE PRACTITIONER

## 2021-03-29 PROCEDURE — 3331090001 HH PPS REVENUE CREDIT

## 2021-03-29 PROCEDURE — 74011250636 HC RX REV CODE- 250/636: Performed by: FAMILY MEDICINE

## 2021-03-29 PROCEDURE — 87635 SARS-COV-2 COVID-19 AMP PRB: CPT

## 2021-03-29 PROCEDURE — 99218 HC RM OBSERVATION: CPT

## 2021-03-29 PROCEDURE — 74011250636 HC RX REV CODE- 250/636: Performed by: NURSE PRACTITIONER

## 2021-03-29 PROCEDURE — 96376 TX/PRO/DX INJ SAME DRUG ADON: CPT

## 2021-03-29 PROCEDURE — 90686 IIV4 VACC NO PRSV 0.5 ML IM: CPT | Performed by: FAMILY MEDICINE

## 2021-03-29 PROCEDURE — 97530 THERAPEUTIC ACTIVITIES: CPT

## 2021-03-29 PROCEDURE — 74011000258 HC RX REV CODE- 258: Performed by: NURSE PRACTITIONER

## 2021-03-29 PROCEDURE — 3331090002 HH PPS REVENUE DEBIT

## 2021-03-29 PROCEDURE — 36415 COLL VENOUS BLD VENIPUNCTURE: CPT

## 2021-03-29 PROCEDURE — 80048 BASIC METABOLIC PNL TOTAL CA: CPT

## 2021-03-29 PROCEDURE — 90471 IMMUNIZATION ADMIN: CPT

## 2021-03-29 PROCEDURE — 85025 COMPLETE CBC W/AUTO DIFF WBC: CPT

## 2021-03-29 PROCEDURE — 97535 SELF CARE MNGMENT TRAINING: CPT

## 2021-03-29 RX ORDER — LANOLIN ALCOHOL/MO/W.PET/CERES
3 CREAM (GRAM) TOPICAL
Status: DISCONTINUED | OUTPATIENT
Start: 2021-03-29 | End: 2021-03-29 | Stop reason: HOSPADM

## 2021-03-29 RX ORDER — CEFPODOXIME PROXETIL 100 MG/1
100 TABLET, FILM COATED ORAL 2 TIMES DAILY
Qty: 10 TAB | Refills: 0 | Status: SHIPPED
Start: 2021-03-29 | End: 2021-04-03

## 2021-03-29 RX ADMIN — POLYETHYLENE GLYCOL 3350 17 G: 17 POWDER, FOR SOLUTION ORAL at 09:49

## 2021-03-29 RX ADMIN — DOCUSATE SODIUM 100 MG: 100 CAPSULE, LIQUID FILLED ORAL at 09:49

## 2021-03-29 RX ADMIN — SERTRALINE 50 MG: 50 TABLET, FILM COATED ORAL at 17:07

## 2021-03-29 RX ADMIN — CEFTRIAXONE SODIUM 1 G: 1 INJECTION, POWDER, FOR SOLUTION INTRAMUSCULAR; INTRAVENOUS at 16:37

## 2021-03-29 RX ADMIN — Medication 3 MG: at 01:06

## 2021-03-29 RX ADMIN — INFLUENZA VIRUS VACCINE 0.5 ML: 15; 15; 15; 15 SUSPENSION INTRAMUSCULAR at 15:06

## 2021-03-29 RX ADMIN — TIZANIDINE 4 MG: 2 TABLET ORAL at 14:52

## 2021-03-29 NOTE — PROGRESS NOTES
[address] [facility] Adult  Hospitalist Group                                                                                          Hospitalist Progress Note  Danielle Ramon NP  Answering service: [phone] OR [phone] from in house phone        Date of Service:  3/29/2021  NAME:  Carolina Garrett  :  1955  MRN:  [id number]      Admission Summary:   Per H&P: Soledad Lancaster is a 77 y.o. female with a past medical history of multiple sclerosis, depression, and mitral valve prolapse that presented to the ER with complaints of increasing weakness.  Patient was seen in neurologist office this morning, Dr. Chichi Carrillo who advised patient to come to the emergency department for admission.  Dr. Chichi Carrillo is recommending MRI of the brain and C-spine to rule out an acute MS flare, as patient symptoms are progressively getting worse. Patient states that over the past couple months she has become progressively more weak and she feels that she needs more help at home but due to lack of family support she is unable to get the help.  She currently has 1 aide that comes in for 5 to 6 hours a day. Past medical history taken from patient and chart. Xavier Guan has received first dose Moderna COVID-19 vaccination. Natalie Ram is unsure of when her second dose is scheduled. Patient denies any syncope, dizziness, shortness of breath, chest pain or tightness, nausea, vomiting, or diarrhea    Interval history / Subjective:    Seen and examined patient. States that she is doing good today. Slept well last night. No new complaints. No overnight events noted. Denies pain. Stable for discharge- pending placement. Assessment & Plan:     Multiple sclerosis with increased weakness  Patient to be admitted to [facility][1]  Supportive care  PT OT consult  MRI brain:Unchanged right matter lesions compatible with the patient's diagnosis of  multiple sclerosis  MRI C-spine:1.  Diffuse cord signal abnormality from C2 through T2 with slight volume loss. No new lesions or abnormal cord enhancement  Neurology following   Case management for placement at discharge  - Continue tizanidine for muscle spasms      Depression  Continue Zoloft  And supportive treatment  - BSMART consulted in ED     UTI  Patient has chronic Leon in place  Continue IV Rocephin  Follow urine culture: preliminary growing gram negative rods.     Constipation  Chronic,  continue bowel regimen    Code status: DNR  DVT prophylaxis: SCDs    Care Plan discussed with: Patient/Family, Nurse and   Anticipated Disposition: SNF/LTC  Anticipated Discharge:Stable for discharge. Awaiting placement     Hospital Problems  Date Reviewed: 5/15/2020          Codes Class Noted POA    Multiple sclerosis ([facility] ICD-10-CM: G35  ICD-9-CM: 139  3/28/2021 Unknown        Multiple sclerosis exacerbation ([facility] ICD-10-CM: G35  ICD-9-CM: 340  2/24/2017 Unknown                Review of Systems:   A comprehensive review of systems was negative except for that written in the HPI. Vital Signs:    Last 24hrs VS reviewed since prior progress note. Most recent are:  Visit Vitals  /71 (BP 1 Location: Left upper arm, BP Patient Position: At rest)   Pulse 85   Temp 98.1 °F (36.7 °C)   Resp 16   Ht 5' 3\" (1.6 m)   Wt 90.7 kg (200 lb)   SpO2 97%   BMI 35.43 kg/m²         Intake/Output Summary (Last 24 hours) at 3/29/2021 0314  Last data filed at 3/29/2021 0434  Gross per 24 hour   Intake    Output 1325 ml   Net -1325 ml        Physical Examination:             Constitutional:  No acute distress, cooperative, pleasant, answers questions    ENT:  Oral mucosa moist, oropharynx benign. Resp:  CTA bilaterally on room air. No wheezing/rhonchi/rales. No accessory muscle use   CV:  Regular rhythm, normal rate, no murmurs, gallops, rubs    GI:  Soft, non distended, non tender.  normoactive bowel sounds, no hepatosplenomegaly     Musculoskeletal:  No edema, warm, 2+ pulses throughout Neurologic:  Upper extremity weakness, paraplegic. AAOx3, CN II-XII reviewed     Psych:  Good insight, Not anxious nor agitated. Data Review:    Review and/or order of clinical lab test  Review and/or order of tests in the medicine section of [facility]      Labs:     Recent Labs     03/29/21  0426 03/27/21  0617   WBC 8.4 8.5   HGB 12.6 12.9   HCT 41.3 40.4    301     Recent Labs     03/29/21  0426 03/27/21  0617 03/26/21  1453    140 138   K 3.8 3.8 3.5    108 104   CO2 27 28 28   BUN 8 10 10   CREA 0.51* 0.46* 0.56   GLU 87 80 87   CA 8.8 9.0 9.6   MG  --   --  2.2     Recent Labs     03/26/21  1453   ALT 27   AP 74   TBILI 0.2   TP 8.9*   ALB 4.0   GLOB 4.9*     No results for input(s): INR, PTP, APTT, INREXT in the last 72 hours. No results for input(s): FE, TIBC, PSAT, FERR in the last 72 hours. Lab Results   Component Value Date/Time    Folate 11.9 07/06/2020 11:13 AM      No results for input(s): PH, PCO2, PO2 in the last 72 hours.   Recent Labs     03/26/21  1453   TROIQ <0.05     Lab Results   Component Value Date/Time    Cholesterol, total 247 (H) 10/10/2018 02:06 PM    HDL Cholesterol 53 10/10/2018 02:06 PM    LDL, calculated 153 (H) 10/10/2018 02:06 PM    Triglyceride 203 (H) 10/10/2018 02:06 PM     No results found for: [facility]  Lab Results   Component Value Date/Time    Color YELLOW/STRAW 03/26/2021 03:21 PM    Appearance CLEAR 03/26/2021 03:21 PM    Specific gravity 1.022 03/26/2021 03:21 PM    pH (UA) 5.0 03/26/2021 03:21 PM    Protein 30 (A) 03/26/2021 03:21 PM    Glucose Negative 03/26/2021 03:21 PM    Ketone Negative 03/26/2021 03:21 PM    Bilirubin Negative 03/26/2021 03:21 PM    Urobilinogen 0.2 03/26/2021 03:21 PM    Nitrites Positive (A) 03/26/2021 03:21 PM    Leukocyte Esterase MODERATE (A) 03/26/2021 03:21 PM    Epithelial cells FEW 03/26/2021 03:21 PM    Bacteria Negative 03/26/2021 03:21 PM    WBC 5-10 03/26/2021 03:21 PM    RBC 0-5 03/26/2021 03:21 PM Medications Reviewed:     Current Facility-Administered Medications   Medication Dose Route Frequency    melatonin tablet 3 mg  3 mg Oral QHS PRN    diphenhydrAMINE (BENADRYL) capsule 50 mg  50 mg Oral Q6H PRN    cefTRIAXone (ROCEPHIN) 1 g in 0.9% sodium chloride (MBP/ADV) 50 mL MBP  1 g IntraVENous Q24H    bisacodyL (DULCOLAX) suppository 10 mg  10 mg Rectal DAILY PRN    ondansetron (ZOFRAN) injection 4 mg  4 mg IntraVENous Q4H PRN    acetaminophen (TYLENOL) tablet 650 mg  650 mg Oral Q4H PRN    docusate sodium (COLACE) capsule 100 mg  100 mg Oral DAILY    polyethylene glycol (MIRALAX) packet 17 g  17 g Oral DAILY    tiZANidine (ZANAFLEX) tablet 4 mg  4 mg Oral Q8H PRN    sertraline (ZOLOFT) tablet 50 mg  50 mg Oral QPM     ______________________________________________________________________  EXPECTED LENGTH OF STAY: - - -  ACTUAL LENGTH OF STAY:          2                 Rubi Barrow NP

## 2021-03-29 NOTE — PROGRESS NOTES
FRANKIE:    RUR 6%    Disposition: SNF-Referral sent to [facility]. CM spoke to Shell jackson. She and Ludmila Olsen are reviewing referral.  Zahraa Carrington obtained. Authorization# [id number]. CM spoke to Ludmila Olsen at [facility]. Demond Del Angel is good through 3/31/2021(next review date).     Transportation: [facility]    [facility], RN/CRM  [phone]

## 2021-03-29 NOTE — DISCHARGE INSTRUCTIONS
Discharge SNF/Rehab Instructions/LTAC       PATIENT ID: Claudia Atkinson  MRN: [id number]   YOB: 1955    DATE OF ADMISSION: 3/26/2021  2:38 PM    DATE OF DISCHARGE: 3/29/2021    PRIMARY CARE PROVIDER: Yue Lara MD       ATTENDING PHYSICIAN: Risa Mckeon MD  DISCHARGING PROVIDER: Justyna Cruz NP     To contact this individual call [phone] and ask the  to page. If unavailable ask to be transferred the Adult Hospitalist Department. CONSULTATIONS: IP CONSULT TO NEUROLOGY    PROCEDURES/SURGERIES: * No surgery found *    [address] [facility] COURSE:     Per H&P: Soledad Lancaster is a 77 y.o. female with a past medical history of multiple sclerosis, depression, and mitral valve prolapse that presented to the ER with complaints of increasing weakness.  Patient was seen in neurologist office this morning, Dr. Adrianne Cameron who advised patient to come to the emergency department for admission.  Dr. Adrianne Cameron is recommending MRI of the brain and C-spine to rule out an acute MS flare, as patient symptoms are progressively getting worse. Patient states that over the past couple months she has become progressively more weak and she feels that she needs more help at home but due to lack of family support she is unable to get the help.  She currently has 1 aide that comes in for 5 to 6 hours a day. Past medical history taken from patient and chart. Ashlee Maldonado has received first dose Moderna COVID-19 vaccination. Toby Nicely is unsure of when her second dose is scheduled. Patient denies any syncope, dizziness, shortness of breath, chest pain or tightness, nausea, vomiting, or diarrhea    DISCHARGE DIAGNOSES / PLAN:      Multiple sclerosis with increased weakness  -Patient to be admitted to [facility]  -Supportive care  -MRI brain:Unchanged right matter lesions compatible with the patient's diagnosis of  multiple sclerosis  -MRI C-spine:1.  Diffuse cord signal abnormality from C2 through T2 with slight volume loss. No new lesions or abnormal cord enhancement  -Neurology following   -Case management for placement at discharge  - Continue tizanidine for muscle spasms      Depression  -Continue Zoloft  -And supportive treatment  - BSMART consulted in ED     UTI  -Patient has chronic Leon in place  - Received IV Rocephin   - Cefpodoxime PO      Constipation  -Chronic,  continue bowel regimen       PENDING TEST RESULTS:   At the time of discharge the following test results are still pending: None     FOLLOW UP APPOINTMENTS:    Follow-up Information     Follow up With Specialties Details Why Contact Info    Taurus Hester MD Family Medicine, Sports Medicine Schedule an appointment as soon as possible for a visit   Radha Gonzales [phone]      [address] [place] [address]  [phone]           ADDITIONAL CARE RECOMMENDATIONS:   Take medications as prescribed     DIET: Regular Diet      TUBE FEEDING INSTRUCTIONS: None   OXYGEN / BiPAP SETTINGS: None    ACTIVITY: Activity as tolerated and PT/OT Eval and Treat    WOUND CARE: None     EQUIPMENT needed: None       DISCHARGE MEDICATIONS:   See Medication Reconciliation Form      NOTIFY YOUR PHYSICIAN FOR ANY OF THE FOLLOWING:   Fever over 101 degrees for 24 hours. Chest pain, shortness of breath, fever, chills, nausea, vomiting, diarrhea, change in mentation, falling, weakness, bleeding. Severe pain or pain not relieved by medications. Or, any other signs or symptoms that you may have questions about.     DISPOSITION:    Home With:   OT  PT  HH  RN      X SNF/Inpatient Rehab/LTAC    Independent/assisted living    Hospice    Other:       PATIENT CONDITION AT DISCHARGE:     Functional status   X Poor     Deconditioned     Independent      Cognition   X  Lucid     Forgetful     Dementia      Catheters/lines (plus indication)   X Leon     PICC     PEG None      Code status     Full code    X DNR      PHYSICAL EXAMINATION AT DISCHARGE:   Refer to Progress Note***      CHRONIC MEDICAL DIAGNOSES:  Problem List as of 3/29/2021 Date Reviewed: 5/15/2020          Codes Class Noted - Resolved    Multiple sclerosis ([facility] ICD-10-CM: G35  ICD-9-CM: 449  3/28/2021 - Present        Closed fracture of proximal end of left fibula ICD-10-CM: S82.832A  ICD-9-CM: 823.01  8/20/2018 - Present        Inability to ambulate due to left ankle or foot ICD-10-CM: R26.2  ICD-9-CM: 719.7  8/20/2018 - Present        Multiple sclerosis exacerbation ([facility] ICD-10-CM: G35  ICD-9-CM: 720  2/24/2017 - Present                  Signed:   Augustina Burns NP  3/29/2021  1:30 PM

## 2021-03-29 NOTE — PROGRESS NOTES
Problem: Self Care Deficits Care Plan (Adult)  Goal: *Acute Goals and Plan of Care (Insert Text)  Description:   FUNCTIONAL STATUS PRIOR TO ADMISSION: The patient lives alone in an apartment, uses wheelchair for mobility, and has care aides 5x/week from 10 am - 3 pm. Patient reports using a ballet bar to assist in bed>wheelchair transfer with additional assistance of aide needed (infer mod). Patient reports that she tries to \"get everything I need to do done by 3 and then I get back in bed before she leaves and I sleep until they come back at 10 the next day. \" Patient reports set-up for grooming ADLs, wearing diaper and bed-level BM hygiene with assistance. Patient would like to be able to get to a toilet but has been unable to for several months. HOME SUPPORT: Lives alone with friendly neighbors. Has care aides come to the home 5x/week. Occupational Therapy Goals  Initiated 3/27/2021  1. Patient will perform EOB grooming with supervision/set-up within 7 day(s). 2.  Patient will perform lower body dressing at bed-level with minimal assistance within 7 day(s). 3.  Patient will perform toilet transfers to [facility] with minimal assistance and sliding board within 7 day(s). 4.  Patient will perform all aspects of toileting with moderate assistance within 7 day(s). 5.  Patient will participate in upper extremity therapeutic exercise/activities with supervision/set-up for 15 minutes within 7 day(s). Outcome: Progressing Towards Goal   OCCUPATIONAL THERAPY TREATMENT  Patient: [facility] (12 y.o. female)  Date: 3/29/2021  Diagnosis: Multiple sclerosis exacerbation ([facility] [G35]  Multiple sclerosis ([facility] Angel Bravo <principal problem not specified>       Precautions:    Chart, occupational therapy assessment, plan of care, and goals were reviewed. ASSESSMENT  Patient continues with skilled OT services and is progressing towards goals.   Participation impacted by impaired strength overall, impaired AROM and strength of bilateral LEs, impaired seated balance, impaired reach to outside base of support, impaired reach to LEs, intermittent spasms throughout bilateral UEs and LEs requiring patient to stop all mobility until spasms stop. Patient participated in sliding board transfer to drop arm recliner with max assist of 2 and self care seated in chair. Patient reports she is currently using a W/C given to her in her building after the owner . It does not have a cushion. States she has a motorized W/C, but states this does not fit through her apartment as well as the manual WC. Current Level of Function Impacting Discharge (ADLs): UE self care with set up, LE self care with max to total assist, sliding board transfer with max assist of 2    Other factors to consider for discharge: Lives alone with an aide 5 to 6 hours a day 5 days a week          PLAN :  Patient continues to benefit from skilled intervention to address the above impairments. Continue treatment per established plan of care to address goals. Recommend with staff: sliding board transfer to drop arm recliner with max assist of 2, bed pan for toileting, chronic west    Recommend next OT session: transfers    Recommendation for discharge: (in order for the patient to meet his/her long term goals)  Therapy up to 5 days/week in SNF setting  If discharged to home, will need assist for all self care, mobility, iADLs, HH  This discharge recommendation:  Has been made in collaboration with the attending provider and/or case management    IF patient discharges home will need the following DME: TBD       SUBJECTIVE:   Patient stated My bottom hurts in that W/C after a while.     OBJECTIVE DATA SUMMARY:   Cognitive/Behavioral Status:  Neurologic State: Alert  Orientation Level: Oriented X4  Cognition: Appropriate for age attention/concentration; Follows commands  Perception: Appears intact  Perseveration: No perseveration noted  Safety/Judgement: Awareness of environment    Functional Mobility and Transfers for ADLs:  Bed Mobility:  Rolling: Moderate assistance;Assist x1;Additional time; Adaptive equipment  Supine to Sit: Moderate assistance;Assist x1;Additional time; Adaptive equipment  Scooting: Moderate assistance;Assist x2; Additional time; Adaptive equipment      Balance:  Sitting: Impaired  Sitting - Static: Good (unsupported)  Sitting - Dynamic: Fair (occasional)    ADL Intervention:       Grooming  Position Performed: Seated in chair  Washing Face: Set-up    Upper Body Bathing  Bathing Assistance: Set-up  Position Performed: Seated in chair    Lower Body Bathing  Lower Body : Maximum assistance  Position Performed: Seated in chair  Cues: Physical assistance    Upper [address]: Minimum  assistance    Lower Body Dressing Assistance  Pants With Elastic Waist: Total assistance(dependent)(inferred from mobiltiy)  Socks: Total assistance (dependent)  Position Performed: Supine;Seated in chair  Cues: Physical assistance; Tactile cues provided;Verbal cues provided    Toileting  Toileting Assistance: Total assistance(dependent)  Adaptive Equipment: Other (comment)(chronic west)    Cognitive Retraining  Safety/Judgement: Awareness of environment      Activity Tolerance:   Fair    After treatment patient left in no apparent distress:   Sitting in chair, Call bell within reach, and Caregiver / family present (nurse)    COMMUNICATION/COLLABORATION:   The patients plan of care was discussed with: Physical therapist and Registered nurse.      LEIDA Momin  Time Calculation: 38 mins

## 2021-03-29 NOTE — PROGRESS NOTES
Problem: Mobility Impaired (Adult and Pediatric)  Goal: *Acute Goals and Plan of Care (Insert Text)  Description: FUNCTIONAL STATUS PRIOR TO ADMISSION: The patient was functional at the wheelchair level and required moderate assistance for transfers to the chair. HOME SUPPORT PRIOR TO ADMISSION: The patient lived with aide for 6hrs/day, then remained in bed for the majority of the time. Cari Altamirano Physical Therapy Goals  Initiated 3/27/2021  1. Patient will move from supine to sit and sit to supine , scoot up and down, and roll side to side in bed with supervision/set-up within 7 day(s). 2.  Patient will transfer from bed to chair and chair to bed with minimal assistance/contact guard assist using the sliding board within 7 day(s). 3.  Patient will perform sitting without assist within 7 day(s). Outcome: Progressing Towards Goal   PHYSICAL THERAPY TREATMENT  Patient: Fanny Delatorre (88 y.o. female)  Date: 3/29/2021  Diagnosis: Multiple sclerosis exacerbation ([facility] [G35]  Multiple sclerosis ([facility] Luretha Push <principal problem not specified>       Precautions:    Chart, physical therapy assessment, plan of care and goals were reviewed. ASSESSMENT  Patient continues with skilled PT services and is slowly progressing towards goals. Pt presents with deconditioning, low endurance, decreased strength, ROM, sitting balance, BLE spasms/tremors initially in sitting, and impaired functional mobility below her baseline. Pt reports being fearful of falling and noted to be fearful of leaning forward in sitting position to perform sliding board transfer. Pt required 2 person assistance for all aspects of mobility. Pt educated on slide board transfer technique with explanation of her understanding how to instruct her caretakers to assist.  She required maximum assist x 2 to complete transfer to recliner with drop arm.       Current Level of Function Impacting Discharge (mobility/balance): bed mobility moderate assist x 2, sliding board transfer with maximum assist x 2    Other factors to consider for discharge: limited assistance at home, has home care aide for 6 hours per day, pt stays in bed most of the time when home alone         PLAN :  Patient continues to benefit from skilled intervention to address the above impairments. Continue treatment per established plan of care. to address goals. Recommendation for discharge: (in order for the patient to meet his/her long term goals)  Therapy up to 5 days/week in SNF setting    This discharge recommendation:  Has been made in collaboration with the attending provider and/or case management    IF patient discharges home will need the following DME: wheelchair, cushion, sliding board, recommend wheelchair fitting at rehab as pt has old wheelchair that she was given        SUBJECTIVE:   Patient stated I pull up from a ballot bar at home.     OBJECTIVE DATA SUMMARY:   Critical Behavior:  Neurologic State: Alert  Orientation Level: Oriented X4  Cognition: Follows commands  Safety/Judgement: Awareness of environment, Fall prevention  Functional Mobility Training:  Bed Mobility:     Supine to Sit: Moderate assistance;Assist x2; Additional time; Adaptive equipment     Scooting: Moderate assistance;Assist x1        Transfers:                       Sliding Board : Maximum assistance;Assist x2; Additional time; Adaptive equipment, verbal cueing for technique, fearful of leaning forward to assist with scooting to chair            Balance:  Sitting: Impaired  Sitting - Static: Good (unsupported)  Sitting - Dynamic: Fair (occasional)                    Pain Rating:  No complaint    Activity Tolerance:   Good    After treatment patient left in no apparent distress:   Sitting in chair and Call bell within reach    COMMUNICATION/COLLABORATION:   The patients plan of care was discussed with: Registered nurse.      Ирина Dodge   Time Calculation: 24 mins

## 2021-03-29 NOTE — PROGRESS NOTES
FRANKIE:    RUR 6%    Disposition: SNF-Patient accepted at [id number] [place] [address] obtained. #[id number]. Patient will go into room 104A. Transportation: [facility] to  at [address] [facility] call report to [phone], [facility].     Kailyn Ledesma, RN/CRM  [phone]

## 2021-03-29 NOTE — DISCHARGE SUMMARY
Discharge Summary       PATIENT ID: Lux Suarez  MRN: [id number]   YOB: 1955    DATE OF ADMISSION: 3/26/2021  2:38 PM    DATE OF DISCHARGE: 03/29/2021  PRIMARY CARE PROVIDER: Tere Scherer MD     ATTENDING PHYSICIAN: Ede José MD  DISCHARGING PROVIDER: Rose Wren NP    To contact this individual call [phone] and ask the  to page. If unavailable ask to be transferred the Adult Hospitalist Department. CONSULTATIONS: IP CONSULT TO NEUROLOGY    PROCEDURES/SURGERIES: * No surgery found *    [address] [facility] COURSE:   Per H&P: Soledad Lancaster is a 77 y.o. female with a past medical history of multiple sclerosis, depression, and mitral valve prolapse that presented to the ER with complaints of increasing weakness.  Patient was seen in neurologist office this morning, Dr. Aashish Rivera who advised patient to come to the emergency department for admission.  Dr. Aashish Rivera is recommending MRI of the brain and C-spine to rule out an acute MS flare, as patient symptoms are progressively getting worse. Patient states that over the past couple months she has become progressively more weak and she feels that she needs more help at home but due to lack of family support she is unable to get the help.  She currently has 1 aide that comes in for 5 to 6 hours a day. Past medical history taken from patient and chart. Mary Jo Merida has received first dose Moderna COVID-19 vaccination. Camacho Perez is unsure of when her second dose is scheduled. Patient denies any syncope, dizziness, shortness of breath, chest pain or tightness, nausea, vomiting, or diarrhea        DISCHARGE DIAGNOSES / PLAN:      Multiple sclerosis with increased weakness  Patient to be admitted to [facility]  Supportive care  MRI brain:Unchanged right matter lesions compatible with the patient's diagnosis of  multiple sclerosis  MRI C-spine:1.  Diffuse cord signal abnormality from C2 through T2 with slight volume loss.  No new lesions or abnormal cord enhancement  Neurology following   Case management for placement at discharge  - Continue tizanidine for muscle spasms      Depression  Continue Zoloft  And supportive treatment  - BSMART consulted in ED     UTI  Patient has chronic Leon in place   Received IV Rocephin   - Cefpodoxime PO      Constipation  Chronic,  continue bowel regimen     ADDITIONAL CARE RECOMMENDATIONS:   Take medications as prescribed. PENDING TEST RESULTS:   At the time of discharge the following test results are still pending: None     FOLLOW UP APPOINTMENTS:    Follow-up Information     Follow up With Specialties Details Why Contact Info    Mikey Nyhan, MD Family Medicine, Sports Medicine Schedule an appointment as soon as possible for a visit  [address]  [phone]               DIET: Regular Diet      ACTIVITY: Activity as tolerated and PT/OT Eval and Treat    WOUND CARE: None     EQUIPMENT needed: None       DISCHARGE MEDICATIONS:  Current Discharge Medication List      CONTINUE these medications which have NOT CHANGED    Details   tiZANidine (ZANAFLEX) 4 mg tablet TAKE ONE TABLET BY MOUTH EVERY 8 HOURS AS NEEDED  Qty: 60 Tab, Refills: 0      sertraline (ZOLOFT) 50 mg tablet TAKE ONE TABLET BY MOUTH DAILY  Qty: 90 Tab, Refills: 1      varicella-zoster recombinant, PF, (Shingrix, PF,) 50 mcg/0.5 mL susr injection 0.5mL by IntraMUSCular route once now and then repeat in 2-6 months  Qty: 0.5 mL, Refills: 1    Associated Diagnoses: Need for shingles vaccine      bisacodyl (DULCOLAX) 10 mg suppository Insert 10 mg into rectum daily as needed. Qty: 1 Each, Refills: 0         STOP taking these medications       interferon beta-1a (Avonex) 30 mcg/0.5 mL pnkt Comments:   Reason for Stopping:                 NOTIFY YOUR PHYSICIAN FOR ANY OF THE FOLLOWING:   Fever over 101 degrees for 24 hours.    Chest pain, shortness of breath, fever, chills, nausea, vomiting, diarrhea, change in mentation, falling, weakness, bleeding. Severe pain or pain not relieved by medications. Or, any other signs or symptoms that you may have questions about. DISPOSITION:    Home With:   OT  PT  HH  RN      X Long term SNF/Inpatient Rehab    Independent/assisted living    Hospice    Other:       PATIENT CONDITION AT DISCHARGE:     Functional status   X Poor     Deconditioned     Independent      Cognition   X  Lucid     Forgetful     Dementia      Catheters/lines (plus indication)   X Leon     PICC     PEG     None      Code status     Full code    X DNR      PHYSICAL EXAMINATION AT DISCHARGE:  General:          Alert, cooperative, no distress, appears stated age. HEENT:           Atraumatic, anicteric sclerae, pink conjunctivae                          No oral ulcers, mucosa moist, throat clear, dentition fair  Neck:               Supple, symmetrical  Lungs:             Clear to auscultation bilaterally. No Wheezing or Rhonchi. No rales. Chest wall:      No tenderness  No Accessory muscle use. Heart:              Regular  rhythm,  No  murmur   No edema  Abdomen:        Soft, non-tender. Not distended. Bowel sounds normal  Extremities:     No cyanosis. No clubbing,                            Skin turgor normal, Capillary refill normal  Skin:                Not pale. Not Jaundiced  No rashes   Psych:             Not anxious or agitated.   Neurologic:      Alert, upper extremity weakness, paraplegic, answers questions appropriately and responds to commands       CHRONIC MEDICAL DIAGNOSES:  Problem List as of 3/29/2021 Date Reviewed: 5/15/2020          Codes Class Noted - Resolved    Multiple sclerosis ([facility] ICD-10-CM: G35  ICD-9-CM: 340  3/28/2021 - Present        Closed fracture of proximal end of left fibula ICD-10-CM: S82.832A  ICD-9-CM: 823.01  8/20/2018 - Present        Inability to ambulate due to left ankle or foot ICD-10-CM: R26.2  ICD-9-CM: 719.7  8/20/2018 - Present Multiple sclerosis exacerbation ([facility] ICD-10-CM: G35  ICD-9-CM: 887  2/24/2017 - Present              Greater than 45 minutes were spent with the patient on counseling and coordination of care    Signed:    Grant Gardner NP  3/29/2021  1:30 PM

## 2021-03-30 ENCOUNTER — PATIENT OUTREACH (OUTPATIENT)
Dept: CASE MANAGEMENT | Age: 66
End: 2021-03-30

## 2021-03-30 ENCOUNTER — HOME CARE VISIT (OUTPATIENT)
Dept: HOME HEALTH SERVICES | Facility: HOME HEALTH | Age: 66
End: 2021-03-30
Payer: MEDICARE

## 2021-03-30 PROCEDURE — 3331090002 HH PPS REVENUE DEBIT

## 2021-03-30 PROCEDURE — 3331090001 HH PPS REVENUE CREDIT

## 2021-03-30 NOTE — PROGRESS NOTES
Per patients cousin Jonah Nina  Ms Hilda Mark is admitted to [facility] rehab and nursing for further rehab.  Intake and supervisor notified- patient will be DCD as of last visit from the agency

## 2021-03-31 ENCOUNTER — PATIENT OUTREACH (OUTPATIENT)
Dept: CASE MANAGEMENT | Age: 66
End: 2021-03-31

## 2021-04-01 NOTE — PROGRESS NOTES
Post Acute Facility Update     *The information contained in this note was received during a weekly care coordination call with the post acute facility*    Current Facility: [address] [place] [address] [facility]  Anticipated Discharge Date: TBD    Facility Nursing Update: No current updates   Facility Social Work Update: No discharge date. Previously lived alone with aide assistance 5-6 hours daily. She may need additional assistance at home or alternate placement. Discharge plan is pending. Upper Extremity Assistance: Moderate Assistance   Lower Extremity Assistance: Moderate Assistance   Bed Mobility: Maximum Assistance  Transfers: Maximum Assistance  Ambulation: Dependent  How far (in feet) is the patient ambulating?  0  Device Used: None reported   Barriers to Discharge: PERRY Suarez MSWILLEM  [facility]

## 2021-04-07 ENCOUNTER — PATIENT OUTREACH (OUTPATIENT)
Dept: CASE MANAGEMENT | Age: 66
End: 2021-04-07

## 2021-04-07 NOTE — PROGRESS NOTES
Post Acute Facility Update     *The information contained in this note was received during a weekly care coordination call with the post acute facility*    Current Facility: [address] [place] [address] [facility]  Anticipated Discharge Date: TBD    Facility Nursing Update: No current updates   Facility Social Work Update: No discharge date. Will return home alone with support from friends. [facility] for personal care services. Upper Extremity Assistance: Moderate Assistance   Lower Extremity Assistance: Maximum Assistance  Bed Mobility: Minimal Assistance   Transfers: Dependent  Ambulation: Dependent  How far (in feet) is the patient ambulating?  0  Device Used: wheelchair   Barriers to Discharge: PERRY Garcia MSW  [facility]

## 2021-04-13 ENCOUNTER — TELEPHONE (OUTPATIENT)
Dept: NEUROLOGY | Age: 66
End: 2021-04-13

## 2021-04-13 ENCOUNTER — PATIENT OUTREACH (OUTPATIENT)
Dept: CASE MANAGEMENT | Age: 66
End: 2021-04-13

## 2021-04-13 NOTE — PROGRESS NOTES
Transition of care outreach postponed for 14 days due to patient's discharge to SNF.   D/C to [facility] 3/29/21 still admitted f/u 14d

## 2021-04-13 NOTE — TELEPHONE ENCOUNTER
Unfortunately I do not have any  options to offer. I would hope that the skilled nursing facility has case management to help coordinate care afterwards.

## 2021-04-13 NOTE — TELEPHONE ENCOUNTER
Jason Sharp, patients POA, calling stating patient is supposed to be released from rehab on Thursday 4/15, but she is stating the patient is not ready to be released back to her home. She is wondering what can be done.  Please call back

## 2021-04-14 ENCOUNTER — PATIENT OUTREACH (OUTPATIENT)
Dept: CASE MANAGEMENT | Age: 66
End: 2021-04-14

## 2021-04-14 NOTE — TELEPHONE ENCOUNTER
Patient's Arleth staley's call returned, verified and advised per Dr. Robert Pina stated the  at [facility] is pursuing options for the patient and she will keep us up to date on the patient's progress.

## 2021-04-14 NOTE — PROGRESS NOTES
Post Acute Facility Update     *The information contained in this note was received during a weekly care coordination call with the post acute facility*    Current Facility: [address] [place] [address] [facility] ([facility])  Anticipated Discharge Date: 4/15/2021    Facility Nursing Update: No current updates   Facility Social Work Update: Transitioning to Medicaid on 4/15 instead of discharging home. Goal is still to discharge home once she is more independent. Resident lives alone and has [facility] for personal care. Upper Extremity Assistance: Stand by Assist - Presence and Cueing  Lower Extremity Assistance: Dependent  Bed Mobility: Moderate Assistance   Transfers: Moderate Assistance   Ambulation: Dependent  How far (in feet) is the patient ambulating?  N/A  Device Used: None   Barriers to Discharge: TBD        Emmy Lebron, MSW  [facility]

## 2021-04-21 ENCOUNTER — PATIENT OUTREACH (OUTPATIENT)
Dept: CASE MANAGEMENT | Age: 66
End: 2021-04-21

## 2021-04-25 NOTE — PROGRESS NOTES
[address] [place] [address] Dr Discharge Note    *The information contained in this note was received during a weekly care coordination call with the post acute facility*      Patient was discharged from [facility], and transitioned to Medicaid at [facility] on 4/15/2021. PCP: Flip Hunt MD  [facility] [place] appointment:   Future Appointments   Date Time Provider Mariya Rivera   6/17/2021  1:15 PM Flip Hunt MD [facility] MAIN BS AMB         LPN Care Coordinator managing patient: [facility] Team will sign off at this time. Medications were not reconciled and general patient assessment was not completed during this skilled nursing facility outreach.      Douglas VIRKN, RN  [facility]

## 2021-04-28 ENCOUNTER — PATIENT OUTREACH (OUTPATIENT)
Dept: CASE MANAGEMENT | Age: 66
End: 2021-04-28

## 2021-04-28 NOTE — PROGRESS NOTES
Transition of care outreach postponed for 14 days due to patient's discharge to SNF.  d/c [facility] 3/29/21 still admitted at 30d

## 2021-05-26 ENCOUNTER — TELEPHONE (OUTPATIENT)
Dept: INTERNAL MEDICINE CLINIC | Age: 66
End: 2021-05-26

## 2021-05-26 NOTE — TELEPHONE ENCOUNTER
----- Message from Roger Hsieh sent at 5/25/2021  4:33 PM EDT -----  Regarding: Dr. Sharon Rogers  Appointment not available    Caller's first and last name and relationship to patient (if not the patient): [facility]      Best contact number: [phone]      Preferred date and time:any day      Scheduled appointment date and time:none avail      Reason for appointment:follow up care      Details to clarify the request:Gogo requesting appt w/Dr. Bety Salmon for Mallory Abrams as she is being dc on 5/26 from rehab      Roger Hsieh

## 2021-06-07 ENCOUNTER — VIRTUAL VISIT (OUTPATIENT)
Dept: INTERNAL MEDICINE CLINIC | Age: 66
End: 2021-06-07
Payer: MEDICARE

## 2021-06-07 DIAGNOSIS — G35 MULTIPLE SCLEROSIS EXACERBATION (HCC): ICD-10-CM

## 2021-06-07 DIAGNOSIS — Z00.00 MEDICARE ANNUAL WELLNESS VISIT, SUBSEQUENT: Primary | ICD-10-CM

## 2021-06-07 DIAGNOSIS — Z12.11 SCREEN FOR COLON CANCER: ICD-10-CM

## 2021-06-07 DIAGNOSIS — Z12.31 ENCOUNTER FOR SCREENING MAMMOGRAM FOR MALIGNANT NEOPLASM OF BREAST: ICD-10-CM

## 2021-06-07 PROCEDURE — G8417 CALC BMI ABV UP PARAM F/U: HCPCS | Performed by: FAMILY MEDICINE

## 2021-06-07 PROCEDURE — 3017F COLORECTAL CA SCREEN DOC REV: CPT | Performed by: FAMILY MEDICINE

## 2021-06-07 PROCEDURE — 1090F PRES/ABSN URINE INCON ASSESS: CPT | Performed by: FAMILY MEDICINE

## 2021-06-07 PROCEDURE — G9899 SCRN MAM PERF RSLTS DOC: HCPCS | Performed by: FAMILY MEDICINE

## 2021-06-07 PROCEDURE — G0439 PPPS, SUBSEQ VISIT: HCPCS | Performed by: FAMILY MEDICINE

## 2021-06-07 PROCEDURE — G8432 DEP SCR NOT DOC, RNG: HCPCS | Performed by: FAMILY MEDICINE

## 2021-06-07 PROCEDURE — G8536 NO DOC ELDER MAL SCRN: HCPCS | Performed by: FAMILY MEDICINE

## 2021-06-07 PROCEDURE — G8427 DOCREV CUR MEDS BY ELIG CLIN: HCPCS | Performed by: FAMILY MEDICINE

## 2021-06-07 PROCEDURE — G8399 PT W/DXA RESULTS DOCUMENT: HCPCS | Performed by: FAMILY MEDICINE

## 2021-06-07 PROCEDURE — 1100F PTFALLS ASSESS-DOCD GE2>/YR: CPT | Performed by: FAMILY MEDICINE

## 2021-06-07 PROCEDURE — 99213 OFFICE O/P EST LOW 20 MIN: CPT | Performed by: FAMILY MEDICINE

## 2021-06-07 PROCEDURE — 3288F FALL RISK ASSESSMENT DOCD: CPT | Performed by: FAMILY MEDICINE

## 2021-06-07 NOTE — PROGRESS NOTES
This is the Subsequent Medicare Annual Wellness Exam, performed 12 months or more after the Initial AWV or the last Subsequent AWV    I have reviewed the patient's medical history in detail and updated the computerized patient record. Assessment/Plan   Education and counseling provided:  Are appropriate based on today's review and evaluation    1. Medicare annual wellness visit, subsequent  2. Screen for colon cancer  -     REFERRAL FOR COLONOSCOPY  3. Encounter for screening mammogram for malignant neoplasm of breast  -     [facility] MAMMO BI SCREENING INCL CAD; Future       Depression Risk Factor Screening     3 most recent PHQ Screens 3/26/2021   PHQ Not Done -   Little interest or pleasure in doing things Several days   Feeling down, depressed, irritable, or hopeless Nearly every day   Total Score PHQ 2 4   Trouble falling or staying asleep, or sleeping too much Not at all   Feeling tired or having little energy Nearly every day   Poor appetite, weight loss, or overeating Not at all   Feeling bad about yourself - or that you are a failure or have let yourself or your family down Nearly every day   Trouble concentrating on things such as school, work, reading, or watching TV Not at all   Moving or speaking so slowly that other people could have noticed; or the opposite being so fidgety that others notice More than half the days   Thoughts of being better off dead, or hurting yourself in some way Several days   PHQ 9 Score 13   How difficult have these problems made it for you to do your work, take care of your home and get along with others Not difficult at all       Alcohol Risk Screen    Do you average more than 1 drink per night or more than 7 drinks a week:  No    On any one occasion in the past three months have you have had more than 3 drinks containing alcohol:  No        Functional Ability and Level of Safety    Hearing: Hearing is good. Activities of Daily Living:   The home contains: no safety equipment. Patient does total self care      Ambulation: with no difficulty     Fall Risk:  Fall Risk Assessment, last 12 mths 3/26/2021   Able to walk? Yes   Fall in past 12 months? -   Number of falls in past 12 months -   Fall with injury?  -      Abuse Screen:  Patient is not abused       Cognitive Screening    Has your family/caregiver stated any concerns about your memory: no     Cognitive Screening: Normal - MMSE (Mini Mental Status Exam)    Health Maintenance Due     Health Maintenance Due   Topic Date Due    COVID-19 Vaccine (1) Never done    DTaP/Tdap/Td series (1 - Tdap) 08/20/1974    Shingrix Vaccine Age 50> (1 of 2) Never done    Pneumococcal 65+ years (1 of 1 - PPSV23) Never done    Colorectal Cancer Screening Combo  10/10/2019    Breast Cancer Screen Mammogram  10/07/2020       Patient Care Team   Patient Care Team:  Glory Costa MD as PCP - General (Family Medicine)  Glory Costa MD as PCP - [facility] Empaneled Provider    History     Patient Active Problem List   Diagnosis Code    Multiple sclerosis exacerbation ([facility] G35    Closed fracture of proximal end of left fibula S82.832A    Inability to ambulate due to left ankle or foot R26.2    Multiple sclerosis ([address] G35     Past Medical History:   Diagnosis Date    Depression     Menopause     Multiple sclerosis ([address]     Neurological disorder     Multiple sclerosis, in remission 2011    Other ill-defined conditions(799.89)     Mitral Value Prolapse      Past Surgical History:   Procedure Laterality Date    HX HEENT      HX HYSTERECTOMY      IR ASP BLADDER SUPRA CATH  6/30/2020    [place] ABDOMEN SURGERY PROC UNLISTED      hernia repair     Current Outpatient Medications   Medication Sig Dispense Refill    tiZANidine (ZANAFLEX) 4 mg tablet TAKE ONE TABLET BY MOUTH EVERY 8 HOURS AS NEEDED (Patient taking differently: take one tab oral every 8 hours as needed for spasms) 60 Tab 0    sertraline (ZOLOFT) 50 mg tablet TAKE ONE TABLET BY MOUTH DAILY 90 Tab 1    varicella-zoster recombinant, PF, (Shingrix, PF,) 50 mcg/0.5 mL susr injection 0.5mL by IntraMUSCular route once now and then repeat in 2-6 months 0.5 mL 1    bisacodyl (DULCOLAX) 10 mg suppository Insert 10 mg into rectum daily as needed. 1 Each 0     No Known Allergies    Family History   Problem Relation Age of Onset   Jermaine Sotelo Breast Cancer Mother         52's     Social History     Tobacco Use    Smoking status: Former Smoker     Quit date:      Years since quittin.4    Smokeless tobacco: Never Used   Substance Use Topics    Alcohol use: Yes     Alcohol/week: 3.0 standard drinks     Types: 3 Shots of liquor per week     Comment: 3 x week       Tito Yates is a 77 y.o. female who was seen by synchronous (real-time) audio-video technology on 2021 for Multiple Sclerosis        Assessment & Plan:   Diagnoses and all orders for this visit:    1. Medicare annual wellness visit, subsequent    2. Screen for colon cancer  -     REFERRAL FOR COLONOSCOPY    3. Encounter for screening mammogram for malignant neoplasm of breast  -     [facility] MAMMO BI SCREENING INCL CAD; Future    4. Multiple sclerosis exacerbation ([facility]      Will find out if we can call her home health and give her a date nursing home to start. Subjective:   she is a 77y.o. year old female who presents for follow-up transition from skilled nursing facility to home. Patient states that she has been doing about the same as she was and still has difficulty ambulating. Patient states that her home health nurse has not showed up and that she has her neighbor helping her with all her basic needs which include changing of urinary bags. She does have a Cathy lift coming in today. Prior to Admission medications    Medication Sig Start Date End Date Taking?  Authorizing Provider   tiZANidine (ZANAFLEX) 4 mg tablet TAKE ONE TABLET BY MOUTH EVERY 8 HOURS AS NEEDED  Patient taking differently: take one tab oral every 8 hours as needed for spasms 3/15/21   Edith Womack MD   sertraline (ZOLOFT) 50 mg tablet TAKE ONE TABLET BY MOUTH DAILY 6/18/20   Edith Womack MD   varicella-zoster recombinant, PF, (Shingrix, PF,) 50 mcg/0.5 mL susr injection 0.5mL by IntraMUSCular route once now and then repeat in 2-6 months 5/15/20   Edith Womack MD   bisacodyl (DULCOLAX) 10 mg suppository Insert 10 mg into rectum daily as needed. 3/1/17   Briana Kumar MD     Patient Active Problem List    Diagnosis Date Noted    Multiple sclerosis ([facility] 03/28/2021    Closed fracture of proximal end of left fibula 08/20/2018    Inability to ambulate due to left ankle or foot 08/20/2018    Multiple sclerosis exacerbation ([facility] 02/24/2017     Current Outpatient Medications   Medication Sig Dispense Refill    tiZANidine (ZANAFLEX) 4 mg tablet TAKE ONE TABLET BY MOUTH EVERY 8 HOURS AS NEEDED (Patient taking differently: take one tab oral every 8 hours as needed for spasms) 60 Tab 0    sertraline (ZOLOFT) 50 mg tablet TAKE ONE TABLET BY MOUTH DAILY 90 Tab 1    varicella-zoster recombinant, PF, (Shingrix, PF,) 50 mcg/0.5 mL susr injection 0.5mL by IntraMUSCular route once now and then repeat in 2-6 months 0.5 mL 1    bisacodyl (DULCOLAX) 10 mg suppository Insert 10 mg into rectum daily as needed. 1 Each 0     No Known Allergies  Past Medical History:   Diagnosis Date    Depression     Menopause     Multiple sclerosis ([facility]     Neurological disorder     Multiple sclerosis, in remission 2011    Other ill-defined conditions(799.89)     Mitral Value Prolapse     Past Surgical History:   Procedure Laterality Date    HX HEENT      HX HYSTERECTOMY      IR ASP BLADDER SUPRA CATH  6/30/2020    [place] ABDOMEN SURGERY PROC UNLISTED      hernia repair     Family History   Problem Relation Age of Onset    Breast Cancer Mother         50's       ROS    Objective:   No flowsheet data found.      [INSTRUCTIONS:  \"[x]\" Indicates a positive item  \"[]\" Indicates a negative item  -- DELETE ALL ITEMS NOT EXAMINED]    Constitutional: [x] Appears well-developed and well-nourished [x] No apparent distress      [] Abnormal -     Mental status: [x] Alert and awake  [x] Oriented to person/place/time [x] Able to follow commands    [] Abnormal -     Eyes:   EOM    [x]  Normal    [] Abnormal -   Sclera  [x]  Normal    [] Abnormal -          Discharge [x]  None visible   [] Abnormal -     HENT: [x] Normocephalic, atraumatic  [] Abnormal -   [x] Mouth/Throat: Mucous membranes are moist    External Ears [x] Normal  [] Abnormal -    Neck: [x] No visualized mass [] Abnormal -     Pulmonary/Chest: [x] Respiratory effort normal   [x] No visualized signs of difficulty breathing or respiratory distress        [] Abnormal -      Musculoskeletal:   [x] Normal gait with no signs of ataxia         [x] Normal range of motion of neck        [] Abnormal -     Neurological:        [x] No Facial Asymmetry (Cranial nerve 7 motor function) (limited exam due to video visit)          [x] No gaze palsy        [] Abnormal -          Skin:        [x] No significant exanthematous lesions or discoloration noted on facial skin         [] Abnormal -            Psychiatric:       [x] Normal Affect [] Abnormal -        [x] No Hallucinations    Other pertinent observable physical exam findings:-        We discussed the expected course, resolution and complications of the diagnosis(es) in detail. Medication risks, benefits, costs, interactions, and alternatives were discussed as indicated. I advised her to contact the office if her condition worsens, changes or fails to improve as anticipated. She expressed understanding with the diagnosis(es) and plan. Tito Yates, was evaluated through a synchronous (real-time) audio-video encounter. The patient (or guardian if applicable) is aware that this is a billable service. Verbal consent to proceed has been obtained within the past 12 months.  The visit was conducted pursuant to the emergency declaration under the [facility], [address] [place] [address] authority and the [employer] and [employer] General Act. Patient identification was verified, and a caregiver was present when appropriate. The patient was located in a state where the provider was credentialed to provide care.       Karma Ghosh MD

## 2021-06-07 NOTE — PATIENT INSTRUCTIONS
Medicare Wellness Visit, Female The best way to live healthy is to have a lifestyle where you eat a well-balanced diet, exercise regularly, limit alcohol use, and quit all forms of tobacco/nicotine, if applicable. Regular preventive services are another way to keep healthy. Preventive services (vaccines, screening tests, monitoring & exams) can help personalize your care plan, which helps you manage your own care. Screening tests can find health problems at the earliest stages, when they are easiest to treat. [facility] follows the current, evidence-based guidelines published by the [facility] (RUSTSTF) when recommending preventive services for our patients. Because we follow these guidelines, sometimes recommendations change over time as research supports it. (For example, mammograms used to be recommended annually. Even though Medicare will still pay for an annual mammogram, the newer guidelines recommend a mammogram every two years for women of average risk). Of course, you and your doctor may decide to screen more often for some diseases, based on your risk and your co-morbidities (chronic disease you are already diagnosed with). Preventive services for you include: - Medicare offers their members a free annual wellness visit, which is time for you and your primary care provider to discuss and plan for your preventive service needs. Take advantage of this benefit every year! 
-All adults over the age of 72 should receive the recommended pneumonia vaccines. Current USPSTF guidelines recommend a series of two vaccines for the best pneumonia protection.  
-All adults should have a flu vaccine yearly and a tetanus vaccine every 10 years.  
-All adults age 48 and older should receive the shingles vaccines (series of two vaccines).      
-All adults age 38-68 who are overweight should have a diabetes screening test once every three years.  
-All adults born between [birth year] and 1965 should be screened once for Hepatitis C. 
-Other screening tests and preventive services for persons with diabetes include: an eye exam to screen for diabetic retinopathy, a kidney function test, a foot exam, and stricter control over your cholesterol.  
-Cardiovascular screening for adults with routine risk involves an electrocardiogram (ECG) at intervals determined by your doctor.  
-Colorectal cancer screenings should be done for adults age 54-65 with no increased risk factors for colorectal cancer. There are a number of acceptable methods of screening for this type of cancer. Each test has its own benefits and drawbacks. Discuss with your doctor what is most appropriate for you during your annual wellness visit. The different tests include: colonoscopy (considered the best screening method), a fecal occult blood test, a fecal DNA test, and sigmoidoscopy. 
 
-A bone mass density test is recommended when a woman turns 65 to screen for osteoporosis. This test is only recommended one time, as a screening. Some providers will use this same test as a disease monitoring tool if you already have osteoporosis. -Breast cancer screenings are recommended every other year for women of normal risk, age 54-69. 
-Cervical cancer screenings for women over age 72 are only recommended with certain risk factors. Here is a list of your current Health Maintenance items (your personalized list of preventive services) with a due date: 
Health Maintenance Due Topic Date Due  
 COVID-19 Vaccine (1) Never done  DTaP/Tdap/Td  (1 - Tdap) 08/20/1974  Shingles Vaccine (1 of 2) Never done  Pneumococcal Vaccine (1 of 1 - PPSV23) Never done  Colorectal Screening  10/10/2019  Mammogram  10/07/2020 Yes yes.

## 2021-06-21 RX ORDER — TIZANIDINE 4 MG/1
4 TABLET ORAL
Qty: 60 TABLET | Refills: 0 | Status: SHIPPED | OUTPATIENT
Start: 2021-06-21 | End: 2021-08-16 | Stop reason: SDUPTHER

## 2021-06-22 RX ORDER — TRAZODONE HYDROCHLORIDE 50 MG/1
50 TABLET ORAL
Qty: 30 TABLET | Refills: 0 | Status: SHIPPED | OUTPATIENT
Start: 2021-06-22 | End: 2021-07-14

## 2021-06-30 NOTE — TELEPHONE ENCOUNTER
Impression: Angular blepharoconjunctivitis, right eye: H10.521. Plan: NO foreign debri in eye at this time. Did notice mucous discharge this morning. Will begin using maxitrol 1 drop TID OD for 1 week. Signed, on your desk

## 2021-07-06 ENCOUNTER — HOSPITAL ENCOUNTER (OUTPATIENT)
Age: 66
Setting detail: OBSERVATION
Discharge: HOME HEALTH | End: 2021-07-08
Attending: EMERGENCY MEDICINE | Admitting: INTERNAL MEDICINE
Payer: MEDICARE

## 2021-07-06 DIAGNOSIS — N39.0 URINARY TRACT INFECTION ASSOCIATED WITH CATHETERIZATION OF URINARY TRACT, UNSPECIFIED INDWELLING URINARY CATHETER TYPE, INITIAL ENCOUNTER (HCC): Primary | ICD-10-CM

## 2021-07-06 DIAGNOSIS — T83.511A URINARY TRACT INFECTION ASSOCIATED WITH CATHETERIZATION OF URINARY TRACT, UNSPECIFIED INDWELLING URINARY CATHETER TYPE, INITIAL ENCOUNTER (HCC): Primary | ICD-10-CM

## 2021-07-06 LAB
ALBUMIN SERPL-MCNC: 3.1 G/DL (ref 3.5–5)
ALBUMIN/GLOB SERPL: 0.8 {RATIO} (ref 1.1–2.2)
ALP SERPL-CCNC: 62 U/L (ref 45–117)
ALT SERPL-CCNC: 18 U/L (ref 12–78)
AMORPH CRY URNS QL MICRO: ABNORMAL
ANION GAP SERPL CALC-SCNC: 5 MMOL/L (ref 5–15)
APPEARANCE UR: ABNORMAL
AST SERPL-CCNC: 11 U/L (ref 15–37)
BACTERIA URNS QL MICRO: ABNORMAL /HPF
BASOPHILS # BLD: 0 K/UL (ref 0–0.1)
BASOPHILS NFR BLD: 1 % (ref 0–1)
BILIRUB SERPL-MCNC: 0.1 MG/DL (ref 0.2–1)
BILIRUB UR QL: NEGATIVE
BUN SERPL-MCNC: 14 MG/DL (ref 6–20)
BUN/CREAT SERPL: 19 (ref 12–20)
CALCIUM SERPL-MCNC: 8.5 MG/DL (ref 8.5–10.1)
CAOX CRY URNS QL MICRO: ABNORMAL
CHLORIDE SERPL-SCNC: 108 MMOL/L (ref 97–108)
CO2 SERPL-SCNC: 28 MMOL/L (ref 21–32)
COLOR UR: ABNORMAL
COMMENT, HOLDF: NORMAL
COMMENT, HOLDF: NORMAL
CREAT SERPL-MCNC: 0.74 MG/DL (ref 0.55–1.02)
DIFFERENTIAL METHOD BLD: ABNORMAL
EOSINOPHIL # BLD: 0.1 K/UL (ref 0–0.4)
EOSINOPHIL NFR BLD: 2 % (ref 0–7)
EPITH CASTS URNS QL MICRO: ABNORMAL /LPF
ERYTHROCYTE [DISTWIDTH] IN BLOOD BY AUTOMATED COUNT: 13.9 % (ref 11.5–14.5)
GLOBULIN SER CALC-MCNC: 3.9 G/DL (ref 2–4)
GLUCOSE SERPL-MCNC: 138 MG/DL (ref 65–100)
GLUCOSE UR STRIP.AUTO-MCNC: NEGATIVE MG/DL
HCT VFR BLD AUTO: 39.6 % (ref 35–47)
HGB BLD-MCNC: 12.1 G/DL (ref 11.5–16)
HGB UR QL STRIP: NEGATIVE
HYALINE CASTS URNS QL MICRO: ABNORMAL /LPF (ref 0–5)
IMM GRANULOCYTES # BLD AUTO: 0.1 K/UL (ref 0–0.04)
IMM GRANULOCYTES NFR BLD AUTO: 1 % (ref 0–0.5)
KETONES UR QL STRIP.AUTO: ABNORMAL MG/DL
LACTATE SERPL-SCNC: 1.1 MMOL/L (ref 0.4–2)
LEUKOCYTE ESTERASE UR QL STRIP.AUTO: ABNORMAL
LYMPHOCYTES # BLD: 2.3 K/UL (ref 0.8–3.5)
LYMPHOCYTES NFR BLD: 27 % (ref 12–49)
MCH RBC QN AUTO: 25.8 PG (ref 26–34)
MCHC RBC AUTO-ENTMCNC: 30.6 G/DL (ref 30–36.5)
MCV RBC AUTO: 84.4 FL (ref 80–99)
MONOCYTES # BLD: 0.5 K/UL (ref 0–1)
MONOCYTES NFR BLD: 6 % (ref 5–13)
NEUTS SEG # BLD: 5.7 K/UL (ref 1.8–8)
NEUTS SEG NFR BLD: 63 % (ref 32–75)
NITRITE UR QL STRIP.AUTO: NEGATIVE
NRBC # BLD: 0 K/UL (ref 0–0.01)
NRBC BLD-RTO: 0 PER 100 WBC
PH UR STRIP: 8 [PH] (ref 5–8)
PLATELET # BLD AUTO: 317 K/UL (ref 150–400)
PMV BLD AUTO: 9.4 FL (ref 8.9–12.9)
POTASSIUM SERPL-SCNC: 3.8 MMOL/L (ref 3.5–5.1)
PROCALCITONIN SERPL-MCNC: <0.05 NG/ML
PROT SERPL-MCNC: 7 G/DL (ref 6.4–8.2)
PROT UR STRIP-MCNC: 30 MG/DL
RBC # BLD AUTO: 4.69 M/UL (ref 3.8–5.2)
RBC #/AREA URNS HPF: ABNORMAL /HPF (ref 0–5)
SAMPLES BEING HELD,HOLD: NORMAL
SAMPLES BEING HELD,HOLD: NORMAL
SODIUM SERPL-SCNC: 141 MMOL/L (ref 136–145)
SP GR UR REFRACTOMETRY: 1.02 (ref 1–1.03)
UROBILINOGEN UR QL STRIP.AUTO: 0.2 EU/DL (ref 0.2–1)
WBC # BLD AUTO: 8.7 K/UL (ref 3.6–11)
WBC URNS QL MICRO: ABNORMAL /HPF (ref 0–4)

## 2021-07-06 PROCEDURE — 99284 EMERGENCY DEPT VISIT MOD MDM: CPT

## 2021-07-06 PROCEDURE — 96374 THER/PROPH/DIAG INJ IV PUSH: CPT

## 2021-07-06 PROCEDURE — 85025 COMPLETE CBC W/AUTO DIFF WBC: CPT

## 2021-07-06 PROCEDURE — 83605 ASSAY OF LACTIC ACID: CPT

## 2021-07-06 PROCEDURE — 80053 COMPREHEN METABOLIC PANEL: CPT

## 2021-07-06 PROCEDURE — 74011250637 HC RX REV CODE- 250/637: Performed by: INTERNAL MEDICINE

## 2021-07-06 PROCEDURE — 36415 COLL VENOUS BLD VENIPUNCTURE: CPT

## 2021-07-06 PROCEDURE — 87077 CULTURE AEROBIC IDENTIFY: CPT

## 2021-07-06 PROCEDURE — 87186 SC STD MICRODIL/AGAR DIL: CPT

## 2021-07-06 PROCEDURE — 74011250636 HC RX REV CODE- 250/636: Performed by: EMERGENCY MEDICINE

## 2021-07-06 PROCEDURE — 99218 HC RM OBSERVATION: CPT

## 2021-07-06 PROCEDURE — 84145 PROCALCITONIN (PCT): CPT

## 2021-07-06 PROCEDURE — 74011000258 HC RX REV CODE- 258: Performed by: EMERGENCY MEDICINE

## 2021-07-06 PROCEDURE — 99214 OFFICE O/P EST MOD 30 MIN: CPT | Performed by: NURSE PRACTITIONER

## 2021-07-06 PROCEDURE — 81001 URINALYSIS AUTO W/SCOPE: CPT

## 2021-07-06 PROCEDURE — 87086 URINE CULTURE/COLONY COUNT: CPT

## 2021-07-06 PROCEDURE — 96372 THER/PROPH/DIAG INJ SC/IM: CPT

## 2021-07-06 PROCEDURE — 74011250636 HC RX REV CODE- 250/636: Performed by: INTERNAL MEDICINE

## 2021-07-06 PROCEDURE — 87040 BLOOD CULTURE FOR BACTERIA: CPT

## 2021-07-06 RX ORDER — SODIUM CHLORIDE 0.9 % (FLUSH) 0.9 %
5-40 SYRINGE (ML) INJECTION EVERY 8 HOURS
Status: DISCONTINUED | OUTPATIENT
Start: 2021-07-06 | End: 2021-07-08 | Stop reason: HOSPADM

## 2021-07-06 RX ORDER — ONDANSETRON 4 MG/1
4 TABLET, ORALLY DISINTEGRATING ORAL
Status: DISCONTINUED | OUTPATIENT
Start: 2021-07-06 | End: 2021-07-08 | Stop reason: HOSPADM

## 2021-07-06 RX ORDER — CEFDINIR 300 MG/1
300 CAPSULE ORAL 2 TIMES DAILY
Qty: 14 CAPSULE | Refills: 0 | Status: SHIPPED | OUTPATIENT
Start: 2021-07-06 | End: 2021-07-13

## 2021-07-06 RX ORDER — TIZANIDINE 4 MG/1
4 TABLET ORAL
Status: DISCONTINUED | OUTPATIENT
Start: 2021-07-06 | End: 2021-07-08 | Stop reason: HOSPADM

## 2021-07-06 RX ORDER — SERTRALINE HYDROCHLORIDE 50 MG/1
50 TABLET, FILM COATED ORAL DAILY
Status: DISCONTINUED | OUTPATIENT
Start: 2021-07-07 | End: 2021-07-08 | Stop reason: HOSPADM

## 2021-07-06 RX ORDER — SODIUM CHLORIDE 9 MG/ML
500 INJECTION, SOLUTION INTRAVENOUS ONCE
Status: COMPLETED | OUTPATIENT
Start: 2021-07-06 | End: 2021-07-06

## 2021-07-06 RX ORDER — TRAZODONE HYDROCHLORIDE 50 MG/1
50 TABLET ORAL
Status: DISCONTINUED | OUTPATIENT
Start: 2021-07-06 | End: 2021-07-08 | Stop reason: HOSPADM

## 2021-07-06 RX ORDER — ACETAMINOPHEN 325 MG/1
650 TABLET ORAL
Status: DISCONTINUED | OUTPATIENT
Start: 2021-07-06 | End: 2021-07-08 | Stop reason: HOSPADM

## 2021-07-06 RX ORDER — ENOXAPARIN SODIUM 100 MG/ML
40 INJECTION SUBCUTANEOUS EVERY 24 HOURS
Status: DISCONTINUED | OUTPATIENT
Start: 2021-07-06 | End: 2021-07-08 | Stop reason: HOSPADM

## 2021-07-06 RX ORDER — POLYETHYLENE GLYCOL 3350 17 G/17G
17 POWDER, FOR SOLUTION ORAL DAILY PRN
Status: DISCONTINUED | OUTPATIENT
Start: 2021-07-06 | End: 2021-07-08 | Stop reason: HOSPADM

## 2021-07-06 RX ORDER — ONDANSETRON 2 MG/ML
4 INJECTION INTRAMUSCULAR; INTRAVENOUS
Status: DISCONTINUED | OUTPATIENT
Start: 2021-07-06 | End: 2021-07-08 | Stop reason: HOSPADM

## 2021-07-06 RX ORDER — FACIAL-BODY WIPES
10 EACH TOPICAL DAILY PRN
Status: DISCONTINUED | OUTPATIENT
Start: 2021-07-06 | End: 2021-07-08 | Stop reason: HOSPADM

## 2021-07-06 RX ORDER — SODIUM CHLORIDE 0.9 % (FLUSH) 0.9 %
5-40 SYRINGE (ML) INJECTION AS NEEDED
Status: DISCONTINUED | OUTPATIENT
Start: 2021-07-06 | End: 2021-07-08 | Stop reason: HOSPADM

## 2021-07-06 RX ORDER — ACETAMINOPHEN 650 MG/1
650 SUPPOSITORY RECTAL
Status: DISCONTINUED | OUTPATIENT
Start: 2021-07-06 | End: 2021-07-08 | Stop reason: HOSPADM

## 2021-07-06 RX ADMIN — TRAZODONE HYDROCHLORIDE 50 MG: 50 TABLET ORAL at 21:49

## 2021-07-06 RX ADMIN — SODIUM CHLORIDE 500 ML: 9 INJECTION, SOLUTION INTRAVENOUS at 12:59

## 2021-07-06 RX ADMIN — Medication 10 ML: at 13:00

## 2021-07-06 RX ADMIN — CEFTRIAXONE SODIUM 1 G: 1 INJECTION, POWDER, FOR SOLUTION INTRAMUSCULAR; INTRAVENOUS at 04:43

## 2021-07-06 RX ADMIN — ENOXAPARIN SODIUM 40 MG: 40 INJECTION SUBCUTANEOUS at 12:38

## 2021-07-06 RX ADMIN — Medication 10 ML: at 10:48

## 2021-07-06 RX ADMIN — Medication 10 ML: at 21:49

## 2021-07-06 RX ADMIN — SODIUM CHLORIDE 1000 ML: 9 INJECTION, SOLUTION INTRAVENOUS at 01:12

## 2021-07-06 NOTE — PROGRESS NOTES
Blood pressure of 91/59 reported to Dr. Patrick Vu. No orders given at this time. 1238 BP now 87/56 HR 61. Patient not symptomatic. PerfectServe sent to Dr. Patrick Vu to notify.      1247 Per Dr. Patrick Vu, start 500 ml bolus

## 2021-07-06 NOTE — ED NOTES
TRANSFER - OUT REPORT:    Verbal report given to [facility]  on [address]  being transferred to (unit) for routine progression of care       Report consisted of patients Situation, Background, Assessment and   Recommendations(SBAR). Information from the following report(s) SBAR was reviewed with the receiving nurse. Lines:   Peripheral IV 07/06/21 Left Antecubital (Active)   Site Assessment Clean 07/06/21 0930   Phlebitis Assessment 0 07/06/21 0930   Infiltration Assessment 0 07/06/21 0930   Dressing Status Clean, dry, & intact 07/06/21 0930   Dressing Type Transparent 07/06/21 0930        Opportunity for questions and clarification was provided.       Patient transported with:

## 2021-07-06 NOTE — PROGRESS NOTES
TRANSFER - IN REPORT:    Verbal report received from [address] RN(name) on Bob Jasmine  being received from ED(unit) for routine progression of care      Report consisted of patients Situation, Background, Assessment and   Recommendations(SBAR). Information from the following report(s) SBAR, ED Summary, MAR, Recent Results and Med Rec Status was reviewed with the receiving nurse. Opportunity for questions and clarification was provided. Assessment completed upon patients arrival to unit and care assumed.

## 2021-07-06 NOTE — PROGRESS NOTES
Bedside and Verbal shift change report given to Thelma Verde RN (oncoming nurse) by Juan Carlos Huerta RN (offgoing nurse). Report included the following information SBAR, ED Summary, MAR and Recent Results.

## 2021-07-06 NOTE — CONSULTS
Neurology  Consult  Franco Bautista FNP-C  Neurology NP  [phone]    Patient: Riccardo Dale MRN: [id number]  SSN: [id number]    YOB: 1955  Age: 77 y.o. Sex: female        Chief Complaint:confusion     Subjective:      Riccardo Dale is a 77 y.o. female with a past medical history of  multiple sclerosis, depression, mitral valve prolapse who presented to the hospital with confusion and change in speech. Neurology was consulted due to her confusion. A CT scan of the head was performed, but the results were inconclusive. Urinary analysis indicates she has a urinary tract infection, for which she is now taking Rocephin. She states that her speech changes were due to her dry mouth. On the exam, she is alert and oriented x4 and she states that she has returned to her baseline. Past Medical History:   Diagnosis Date    Depression     Menopause     Multiple sclerosis ([facility] [address]     Neurological disorder     Multiple sclerosis, in remission     Other ill-defined conditions(799.89)     Mitral Value Prolapse     Past Surgical History:   Procedure Laterality Date    HX HEENT      HX HYSTERECTOMY      IR ASP BLADDER SUPRA CATH  2020    LA ABDOMEN SURGERY PROC UNLISTED      hernia repair      Family History   Problem Relation Age of Onset    Breast Cancer Mother         52's     Social History     Tobacco Use    Smoking status: Former Smoker     Quit date:      Years since quittin.5    Smokeless tobacco: Never Used   Substance Use Topics    Alcohol use:  Yes     Alcohol/week: 3.0 standard drinks     Types: 3 Shots of liquor per week     Comment: 3 x week      Current Facility-Administered Medications   Medication Dose Route Frequency Provider Last Rate Last Admin    sodium chloride (NS) flush 5-40 mL  5-40 mL IntraVENous Q8H Rafia CABRALES MD        sodium chloride (NS) flush 5-40 mL  5-40 mL IntraVENous PRN Rafia Yin MD        acetaminophen (TYLENOL) tablet 650 mg  650 mg Oral Q6H PRN Halie Houston MD        Or    acetaminophen (TYLENOL) suppository 650 mg  650 mg Rectal Q6H PRN Halie Houston MD        polyethylene glycol [facility]) packet 17 g  17 g Oral DAILY PRN Miriam Montero MD        ondansetron (ZOFRAN ODT) tablet 4 mg  4 mg Oral Q8H PRN Halie Houston MD        Or    ondansetron [facility] injection 4 mg  4 mg IntraVENous Q6H PRN Halie Houston MD            No Known Allergies    Review of Systems:  Pertinent items are noted in the History of Present Illness. Objective:     Vitals:    07/06/21 0700 07/06/21 0830 07/06/21 0930 07/06/21 1048   BP: 116/62 120/67 132/71 (!) 91/59   Pulse:  72 70 64   Resp:  17 18 19   Temp:  98.2 °F (36.8 °C) 98.9 °F (37.2 °C) 97.9 °F (36.6 °C)   SpO2: 97% 94% 96% 94%   Weight:            Physical Exam:  GENERAL: alert, cooperative, no distress, appears stated age  LUNG: clear to auscultation bilaterally  HEART: regular rate and rhythm      Neurologic Exam:  Mental Status:  Alert and oriented x 4. Appropriate affect, mood and behavior. Language:    Normal fluency, repetition, comprehension and naming. Cranial Nerves:       Pupils equal, round and reactive to light. Visual fields full to confrontation. Extraocular movements intact. Facial sensation intact. Full facial strength, no asymmetry. Hearing intact bilaterally. No dysarthria. Tongue protrudes to midline, palate elevates symmetrically. Shoulder shrug 5/5 bilaterally. Motor:    No pronator drift. Bulk and tone normal.      4 /5 power in all extremities proximally and distally upper extremities. Globally weak  lower extremities patient is bedbound.  (She has not walked in a year)       Sensation:    Sensation intact throughout to light touch,   Reflexes:    Bilateral foot drop    Coordination & Gait: FTn Intact     Recent Results (from the past 24 hour(s))   LACTIC ACID    Collection Time: 07/06/21  1:07 AM   Result Value Ref Range    Lactic acid 1.1 0.4 - 2.0 MMOL/L   SAMPLES BEING HELD    Collection Time: 07/06/21  1:11 AM   Result Value Ref Range    SAMPLES BEING HELD 1BLU     COMMENT        Add-on orders for these samples will be processed based on acceptable specimen integrity and analyte stability, which may vary by analyte. CBC WITH AUTOMATED DIFF    Collection Time: 07/06/21  1:11 AM   Result Value Ref Range    WBC 8.7 3.6 - 11.0 K/uL    RBC 4.69 3.80 - 5.20 M/uL    HGB 12.1 11.5 - 16.0 g/dL    HCT 39.6 35.0 - 47.0 %    MCV 84.4 80.0 - 99.0 FL    MCH 25.8 (L) 26.0 - 34.0 PG    MCHC 30.6 30.0 - 36.5 g/dL    RDW 13.9 11.5 - 14.5 %    PLATELET 598 045 - 848 K/uL    MPV 9.4 8.9 - 12.9 FL    NRBC 0.0 0  WBC    ABSOLUTE NRBC 0.00 0.00 - 0.01 K/uL    NEUTROPHILS 63 32 - 75 %    LYMPHOCYTES 27 12 - 49 %    MONOCYTES 6 5 - 13 %    EOSINOPHILS 2 0 - 7 %    BASOPHILS 1 0 - 1 %    IMMATURE GRANULOCYTES 1 (H) 0.0 - 0.5 %    ABS. NEUTROPHILS 5.7 1.8 - 8.0 K/UL    ABS. LYMPHOCYTES 2.3 0.8 - 3.5 K/UL    ABS. MONOCYTES 0.5 0.0 - 1.0 K/UL    ABS. EOSINOPHILS 0.1 0.0 - 0.4 K/UL    ABS. BASOPHILS 0.0 0.0 - 0.1 K/UL    ABS. IMM.  GRANS. 0.1 (H) 0.00 - 0.04 K/UL    DF AUTOMATED     PROCALCITONIN    Collection Time: 07/06/21  1:11 AM   Result Value Ref Range    Procalcitonin <2.45 ng/mL   METABOLIC PANEL, COMPREHENSIVE    Collection Time: 07/06/21  1:11 AM   Result Value Ref Range    Sodium 141 136 - 145 mmol/L    Potassium 3.8 3.5 - 5.1 mmol/L    Chloride 108 97 - 108 mmol/L    CO2 28 21 - 32 mmol/L    Anion gap 5 5 - 15 mmol/L    Glucose 138 (H) 65 - 100 mg/dL    BUN 14 6 - 20 MG/DL    Creatinine 0.74 0.55 - 1.02 MG/DL    BUN/Creatinine ratio 19 12 - 20      GFR est AA >60 >60 ml/min/1.73m2    GFR est non-AA >60 >60 ml/min/1.73m2    Calcium 8.5 8.5 - 10.1 MG/DL    Bilirubin, total 0.1 (L) 0.2 - 1.0 MG/DL    ALT (SGPT) 18 12 - 78 U/L    AST (SGOT) 11 (L) 15 - 37 U/L    Alk. phosphatase 62 45 - 117 U/L    Protein, total 7.0 6.4 - 8.2 g/dL    Albumin 3.1 (L) 3.5 - 5.0 g/dL    Globulin 3.9 2.0 - 4.0 g/dL    A-G Ratio 0.8 (L) 1.1 - 2.2     URINALYSIS W/MICROSCOPIC    Collection Time: 07/06/21  3:09 AM   Result Value Ref Range    Color YELLOW/STRAW      Appearance TURBID (A) CLEAR      Specific gravity 1.021 1.003 - 1.030      pH (UA) 8.0 5.0 - 8.0      Protein 30 (A) NEG mg/dL    Glucose Negative NEG mg/dL    Ketone TRACE (A) NEG mg/dL    Bilirubin Negative NEG      Blood Negative NEG      Urobilinogen 0.2 0.2 - 1.0 EU/dL    Nitrites Negative NEG      Leukocyte Esterase LARGE (A) NEG      WBC  0 - 4 /hpf    RBC 10-20 0 - 5 /hpf    Epithelial cells FEW FEW /lpf    Bacteria 4+ (A) NEG /hpf    Amorphous Crystals 1+ (A) NEG    CA Oxalate crystals FEW (A) NEG      Hyaline cast 5-10 0 - 5 /lpf   SAMPLES BEING HELD    Collection Time: 07/06/21  3:09 AM   Result Value Ref Range    SAMPLES BEING HELD 1UC     COMMENT        Add-on orders for these samples will be processed based on acceptable specimen integrity and analyte stability, which may vary by analyte. Imaging:  CT Results (most recent):  Results from Hospital Encounter encounter on 05/19/19    CT HEAD WO CONT    Narrative  EXAM: CT HEAD WO CONT    INDICATION: fall, head injury. Left periorbital soft tissue swelling. COMPARISON: 2/24/2017. CONTRAST: None. TECHNIQUE: Unenhanced CT of the head was performed using 5 mm images. Brain and  bone windows were generated. CT dose reduction was achieved through use of a  standardized protocol tailored for this examination and automatic exposure  control for dose modulation. FINDINGS:  The ventricles and sulci are stable in size, shape and configuration and  midline. There is moderate persistent periventricular white matter hypodensity. .  There is no intracranial hemorrhage, extra-axial collection, mass, mass effect  or midline shift. The basilar cisterns are open. No acute infarct is  identified. The bone windows demonstrate no abnormalities. The visualized  portions of the paranasal sinuses and mastoid air cells are clear. Impression  IMPRESSION:    Stable underlying white matter disease. No acute intracranial hemorrhage  identified      Assessment:     Hospital Problems  Date Reviewed: 6/7/2021        Codes Class Noted POA    UTI (urinary tract infection) ICD-10-CM: N39.0  ICD-9-CM: 599.0  7/6/2021 Unknown              Plan:     A 68-year-old female with a history of MS presents to the hospital with confusion in the setting of urinary tract infection. On the exam, she was now alert and oriented x4. currently on Rocephin for her urinary tract infection. recommend we continue to treat acute infection. As for her, MS recommend that she follow up outpatient with Dr. Tang Sharp. We will hold off on further imaging at this time. Okay for discharge from neurology standpoint. Thank you very much for this consultation. No further neurologic recommendations at this time. Will sign off but please call with questions.         Signed By: Zayda Cat NP     July 6, 2021

## 2021-07-06 NOTE — ED TRIAGE NOTES
Pt coming from home. Per family increase confusion, cloudiness and sediments of urine from suprapubic cath. Pt denies CP, SOB, N/V, and abd pain.

## 2021-07-06 NOTE — H&P
[facility] Adult  Hospitalist Group  History and Physical    Primary Care Provider: Miguel A Matias MD  Date of Service:  7/6/2021    Subjective:     Tessa Gonzales is a 77 y.o. female with pmh of MS wheelchair bound, urinary retention with suprapubic cath, who presented with confusion. Pt was in her typical usual state of health, when her cousin came to visit, and she was confused, talking about random things. She remembers telling them about performing surgery on herself. Lives by herslef, but has an aid that comes in weekdays 9-3. No fevers, chills, abdominal pain, no pain with urination. No new focal weakness, has had ongoing lower extremity weakness for months. Unable to transfer herself into the wheelchair for months. Review of Systems:    A comprehensive review of systems was negative except for that written in the History of Present Illness. Past Medical History:   Diagnosis Date    Depression     Menopause     Multiple sclerosis ([facility] [address]     Neurological disorder     Multiple sclerosis, in remission 2011    Other ill-defined conditions(799.89)     Mitral Value Prolapse      Past Surgical History:   Procedure Laterality Date    HX HEENT      HX HYSTERECTOMY      IR ASP BLADDER SUPRA CATH  6/30/2020    MD ABDOMEN SURGERY PROC UNLISTED      hernia repair     Prior to Admission medications    Medication Sig Start Date End Date Taking? Authorizing Provider   cefdinir (OMNICEF) 300 mg capsule Take 1 Capsule by mouth two (2) times a day for 7 days. 7/6/21 7/13/21 Yes Juan F Mcgee MD   traZODone (DESYREL) 50 mg tablet Take 1 Tablet by mouth nightly.  6/22/21   Miguel A Matias MD   tiZANidine (ZANAFLEX) 4 mg tablet Take 1 Tablet by mouth three (3) times daily as needed for Muscle Spasm(s). 6/21/21   Miguel A Matias MD   sertraline (ZOLOFT) 50 mg tablet TAKE ONE TABLET BY MOUTH DAILY 6/18/20   Miguel A Matias MD   varicella-zoster recombinant, PF, (Shingrix, PF,) 50 mcg/0.5 mL susr injection 0.5mL by IntraMUSCular route once now and then repeat in 2-6 months 5/15/20   Alejandra Zarco MD   bisacodyl (DULCOLAX) 10 mg suppository Insert 10 mg into rectum daily as needed. 3/1/17   Carolyn Loera MD     No Known Allergies   Family History   Problem Relation Age of Onset    Breast Cancer Mother         52's        SOCIAL HISTORY:  Patient resides at Home. Patient ambulates wheelchair. Unable to transfer from bed to wheelchair. Luis Alberto Rivera life is pending. Smoking history: never  Alcohol history: seldom. Objective:       Physical Exam:   Visit Vitals  BP (!) 91/59 (BP 1 Location: Right upper arm, BP Patient Position: At rest)   Pulse 64   Temp 97.9 °F (36.6 °C)   Resp 19   Wt 90.7 kg (199 lb 15.3 oz)   SpO2 94%   BMI 35.42 kg/m²     General appearance: alert, cooperative, no distress, appears stated age  Head: Normocephalic, without obvious abnormality, atraumatic  Eyes: conjunctivae/corneas clear. PERRL, EOM's intact. Lungs: clear to auscultation bilaterally  Heart: regular rate and rhythm, S1, S2 normal, no murmur, click, rub or gallop  Abdomen: soft, non-tender. Bowel sounds normal. No masses,  no organomegaly  Extremities: extremities normal, atraumatic, no cyanosis or edema  Pulses: 2+ and symmetric  Skin: Skin color, texture, turgor normal. No rashes or lesions  Neurologic: Grossly normal  Cap refill: Brisk, less than 3 seconds  Pulses: 2+, symmetric in all extremities  Skin: Warm, dry, without rashes or lesions      Data Review: All diagnostic labs and studies have been reviewed. MRI 3/2021  IMPRESSION  Unchanged right matter lesions compatible with the patient's diagnosis of  multiple sclerosis    Assessment:     Active Problems:    UTI (urinary tract infection) (7/6/2021)        Plan:     Brief period of metabolic encephalopathy - unclear etiology. UA positive however with suprapubic cath, this is likely contaminated.    - Neurology consulted, appreciate assistance  - Treat UTI, resolved now and no sign of other neurologic deficit which is consistent with CVA  - Will hold off on MRI until neuro disagrees    Abnormal UA   Urinary retention with suprapubic catheter  - F/U urine culture  - Continue CTX for now     MS - progressive, wheelchair bound   - PT/OT --> patient is not willing to go to SNF/LTC   - Continue home tizanidine     Depression - continue home meds   Insomnia - Trazodone     DNR   DVT PPx - lovenox     FUNCTIONAL STATUS PRIOR TO HOSPITALIZATION Wheelchair Bound     Signed By: Adrian Saavedra MD     July 6, 2021         Addendum - spoke to family over the phone. They are frustrated because they feel Rich Garcia is unable to care for herself, as she is unable to transfer independently. She does not currently have 24/ care, and there are no family members who are able to provide care. She has been adamant about refusing SNF, and LTC both to me and family. I understand the frustration of the family as I too, believe she would be better at LTC. That being said Rich Garcia is currently alert and oriented and can make her own decisions. If she is able to verbalize to me risk and benefit I am unable to go against her wishes. Her family expressed understanding.

## 2021-07-06 NOTE — ED PROVIDER NOTES
HPI       72y F with MS here with concern for cloudy urine. She also reports a dry mouth from some of the medications she is taking. She was talking with her family tonight who felt like her speech was different so EMS was called. She says that her speech is normal to her, it's just that her mouth is dry. She denies any numbness or weakness. No fever. No pain. No vomiting. She has a suprapubic catheter and has noticed cloudy urine in the past few days. Past Medical History:   Diagnosis Date    Depression     Menopause     Multiple sclerosis ([address]     Neurological disorder     Multiple sclerosis, in remission     Other ill-defined conditions(799.89)     Mitral Value Prolapse       Past Surgical History:   Procedure Laterality Date    HX HEENT      HX HYSTERECTOMY      IR ASP BLADDER SUPRA CATH  2020    MS ABDOMEN SURGERY PROC UNLISTED      hernia repair         Family History:   Problem Relation Age of Onset    Breast Cancer Mother         52's       Social History     Socioeconomic History    Marital status: SINGLE     Spouse name: Not on file    Number of children: Not on file    Years of education: Not on file    Highest education level: Not on file   Occupational History    Not on file   Tobacco Use    Smoking status: Former Smoker     Quit date:      Years since quittin.5    Smokeless tobacco: Never Used   Vaping Use    Vaping Use: Never used   Substance and Sexual Activity    Alcohol use:  Yes     Alcohol/week: 3.0 standard drinks     Types: 3 Shots of liquor per week     Comment: 3 x week    Drug use: Yes     Frequency: 1.0 times per week     Types: Marijuana     Comment: occasional marijuana    Sexual activity: Not Currently   Other Topics Concern     Service Not Asked    Blood Transfusions Not Asked    Caffeine Concern Not Asked    Occupational Exposure Not Asked    Hobby Hazards Not Asked    Sleep Concern Not Asked    Stress Concern Not Asked    Weight Concern Not Asked    Special Diet Not Asked    Back Care Not Asked    Exercise Not Asked    Bike Helmet Not Asked   [address] Not Asked    Self-Exams Not Asked   Social History Narrative    Not on file     Social Determinants of Health     Financial Resource Strain:     Difficulty of Paying Living Expenses:    Food Insecurity:     Worried About Running Out of Food in the Last Year:     Ran Out of Food in the Last Year:    Transportation Needs:     Lack of Transportation (Medical):  Lack of Transportation (Non-Medical):    Physical Activity:     Days of Exercise per Week:     Minutes of Exercise per Session:    Stress:     Feeling of Stress :    Social Connections:     Frequency of Communication with Friends and Family:     Frequency of Social Gatherings with Friends and Family:     Attends Voodoo Services:     Active Member of Clubs or Organizations:     Attends Club or Organization Meetings:     Marital Status:    Intimate Partner Violence:     Fear of Current or Ex-Partner:     Emotionally Abused:     Physically Abused:     Sexually Abused: ALLERGIES: Patient has no known allergies. Review of Systems   Review of Systems   Constitutional: (-) weight loss. HEENT: (-) stiff neck   Eyes: (-) discharge. Respiratory: (-) cough. Cardiovascular: (-) syncope. Gastrointestinal: (-) blood in stool. Genitourinary: (-) hematuria. Musculoskeletal: (-) myalgias. Neurological: (-) seizure. Skin: (-) petechiae  Lymph/Immunologic: (-) enlarged lymph nodes  All other systems reviewed and are negative. Vitals:    07/06/21 0051   BP: (!) 81/69   Pulse: 65   Resp: 19   Temp: 98.3 °F (36.8 °C)   SpO2: 95%            Physical Exam Nursing note and vitals reviewed. Constitutional: oriented to person, place, and time. appears well-developed and well-nourished. No distress. Head: Normocephalic and atraumatic.  Sclera anicteric  Nose: No rhinorrhea  Mouth/Throat: Oropharynx is clear and moist. Pharynx normal  Eyes: Conjunctivae are normal. Pupils are equal, round, and reactive to light. Right eye exhibits no discharge. Left eye exhibits no discharge. Neck: Painless normal range of motion. Neck supple. No LAD. Cardiovascular: Normal rate, regular rhythm, normal heart sounds and intact distal pulses. Exam reveals no gallop and no friction rub. No murmur heard. Pulmonary/Chest:  No respiratory distress. No wheezes. No rales. No rhonchi. No increased work of breathing. No accessory muscle use. Good air exchange throughout. Abdominal: soft, non-tender, no rebound or guarding. No hepatosplenomegaly. Normal bowel sounds throughout. Back: no tenderness to palpation, no deformities, no CVA tenderness  Extremities/Musculoskeletal: Normal range of motion. no tenderness. No edema. Distal extremities are neurovasc intact. Lymphadenopathy:   No adenopathy. Neurological:  Alert and oriented to person, place, and time. CN 2-12 intact. Motor and sensory function grossly intact. Skin: Skin is warm and dry. No rash noted. No pallor. MDM 72y F here with cloudy urine. Plan for labs. BP in the 80's here but she says her BP is often \"low. \" Will give fluids and also send lactic and procalcitonin. Procedures      5:18 AM  Will treat for UTI. BP improved with IVF here. Pt says she didn't eat/drink as much yesterday as normal. Lactate and procalcitonin are both ok. Do not think this represent sepsis. Pt would like to go back home. 6:58 AM  Spoke with family. Apparently they are concerned because she lives alone and doesn't have the proper care to assist her. They have also noted changes in mentation over the past week. A similar episode happened back in the spring that required an admission. When I spoke with the patient again, she is ok with the plan to admit. Perfect Serve Consult for Admission  6:59 AM    ED Room Number: ER15/15  Patient Name and age:  Malinda Ventura 77 y.o. female  Working Diagnosis:   1. Urinary tract infection associated with catheterization of urinary tract, unspecified indwelling urinary catheter type, initial encounter ([facility] [address]        COVID-19 Suspicion:  no  Sepsis present:  no  Reassessment needed: no  Code Status:  Full Code  Readmission: no  Isolation Requirements:  no  Recommended Level of Care:  med/surg  Department:[facility] Adult ED - 21   Other:  72y F with MS (followed by [facility] neuro) here with UTI. Family also states increasing confusion over the past week and inability to care for self at home. She lives alone and does not have adequate help given current state. Labs ok here. Written for ceftraixone. Had a very similar episode this past spring that required admission as well.    7:26 AM  Spoke with Bernardo Conrad (Lucius Brown) at [phone] to give her an update that patient would be admitted. Also spoke with neurology to let them know she is here and being admitted.

## 2021-07-06 NOTE — PROGRESS NOTES
7/6/2021; 09:50 -     TRANSITIONS OF CARE PLAN:   1. RUR: N/A; OBS - RRAT: 9  2. DESTINATION: TBD: home vs LTC placement  3. TRANSPORT: Likely BLS   4. NEEDS FOR DISCHARGE: TBD - may need JOEL Orders for [facility]   5. ANTICIPATED FOLLOW UPS: PCP, Neuro  6. ONGOING INPATIENT NEEDS: IVF, ABX, Neuro Consult, Further plan per attending     Anticipated Discharge is: 24-48 Hours    Reason for Admission:  UTI               RUR Score: N/A; OBS - RRAT: 9                    Plan for utilizing home health: Currently open to [facility]         PCP: First and Last name:  Ankit Desai MD     Name of Practice: [facility]   Are you a current patient: Yes/No: Yes   Approximate date of last visit: 6/7 ; has an appt for today 7/6   Can you participate in a virtual visit with your PCP: yes                    Current Advanced Directive/Advance Care Plan: Full Code - CM notes that patient has a DDNR on file; attending is aware of the above    Healthcare Decision Maker:   Click here to complete [address] including selection of the Healthcare Decision Maker Relationship (ie \"Primary\")             Primary Decision Maker: Susi Holly - Other Relative - [phone]                  AMD is under media as External Medical Records, scanned 7/6    Transition of Care Plan:   CM notes that patient presented with increased confusion. CM contacted listed emergency contact (cousin/mPOAKinjal: [phone]). Patient has PMH of MS with suprapubic catheter. Patient lives alone in a 3rd floor apartment, elevator access. Patient is able to feed self; patient is assisted in other ADLs. Per patient's family, patient has also had a decrease in cognitive ability, with issues in short term memory. Home DME includes: electric wheelchair, manual wheelchair, hospital bed, cane; carolann lift is pending. Patient has personal care aids via an unknown care aid agency daily from 9A-3P M-F.   Per patient's mPOA, patient has been attempting to obtain weekend coverage for care aid services, but agency has not been able to staff additional hours. Patient is currently open to [facility]. Patient also has a hx of HH with [address]. Patient has hx of Rehab with [facility]. Patient's mPOA identified that family has been considering potential LTC placement for patient due to increasing needs. Family expressed concerns that patient is likely not safe to discharge to own home due to limited home support; patient has been resistant to LTC placement. CM discussed that if patient is considered to be an unsafe discharge to home, it may be needed that an APS case is opened on patient prior to discharge, if patient continues to disagree with LTC placement. HELEN CM to discuss the above with Attending and Unit CM. Pharmacy preference is Henrietta Hinton at [facility]. Disposition is TBD, dependent on progression. Observation notice provided in writing to patient and/or caregiver as well as verbal explanation of the policy. Patients who are in outpatient status also receive the Observation notice. Patient has received notice and or patient representative has received via secure email, fax, or certified mail based on patient representative's preference. Care Management Interventions  PCP Verified by CM:  Yes (followed by Dr. Wilbur Mejia)  Last Visit to PCP: 06/07/21  Palliative Care Criteria Met (RRAT>21 & CHF Dx)?: No  Mode of Transport at Discharge: [facility]  Transition of Care Consult (CM Consult): Home Health, Discharge  [facility] [address] [address]: No  Reason Outside [place]: Patient already serviced by other home care/hospice agency (currently open to [facility])  MyChart Signup: Yes  Discharge Durable Medical Equipment: No (has: electric wheelchair, manual wheelchair, hospital bed, cane; carolann lift is pending)  Health Maintenance Reviewed: Yes (CM spoke with patient's mPOA by phone)  Physical Therapy Consult: No  Occupational Therapy Consult: No  Speech Therapy Consult: No  Current Support Network: Own Home, Family Lives Nearby, Lives Alone  Confirm Follow Up Transport: Other (see comment) (Medicaid transport; requires assistance with most ADLs)  [employer] Information Provided?: No  Discharge Location  Discharge Placement: Unable to determine at this time (lives alone in a 3rd floor apartment, elevator access)  CRM: Daniel Frank, MPH, [address] [place] [address]; Z: [phone]

## 2021-07-07 LAB
ANION GAP SERPL CALC-SCNC: 4 MMOL/L (ref 5–15)
BUN SERPL-MCNC: 10 MG/DL (ref 6–20)
BUN/CREAT SERPL: 20 (ref 12–20)
CALCIUM SERPL-MCNC: 8.7 MG/DL (ref 8.5–10.1)
CHLORIDE SERPL-SCNC: 111 MMOL/L (ref 97–108)
CO2 SERPL-SCNC: 26 MMOL/L (ref 21–32)
CREAT SERPL-MCNC: 0.5 MG/DL (ref 0.55–1.02)
GLUCOSE SERPL-MCNC: 102 MG/DL (ref 65–100)
MAGNESIUM SERPL-MCNC: 2 MG/DL (ref 1.6–2.4)
PHOSPHATE SERPL-MCNC: 3.6 MG/DL (ref 2.6–4.7)
POTASSIUM SERPL-SCNC: 4.1 MMOL/L (ref 3.5–5.1)
SODIUM SERPL-SCNC: 141 MMOL/L (ref 136–145)

## 2021-07-07 PROCEDURE — 74011250636 HC RX REV CODE- 250/636: Performed by: INTERNAL MEDICINE

## 2021-07-07 PROCEDURE — 96372 THER/PROPH/DIAG INJ SC/IM: CPT

## 2021-07-07 PROCEDURE — 36415 COLL VENOUS BLD VENIPUNCTURE: CPT

## 2021-07-07 PROCEDURE — 83735 ASSAY OF MAGNESIUM: CPT

## 2021-07-07 PROCEDURE — 80048 BASIC METABOLIC PNL TOTAL CA: CPT

## 2021-07-07 PROCEDURE — 99218 HC RM OBSERVATION: CPT

## 2021-07-07 PROCEDURE — 74011000258 HC RX REV CODE- 258: Performed by: INTERNAL MEDICINE

## 2021-07-07 PROCEDURE — 74011250637 HC RX REV CODE- 250/637: Performed by: INTERNAL MEDICINE

## 2021-07-07 PROCEDURE — 84100 ASSAY OF PHOSPHORUS: CPT

## 2021-07-07 PROCEDURE — 96376 TX/PRO/DX INJ SAME DRUG ADON: CPT

## 2021-07-07 RX ADMIN — Medication 10 ML: at 04:20

## 2021-07-07 RX ADMIN — ENOXAPARIN SODIUM 40 MG: 40 INJECTION SUBCUTANEOUS at 13:43

## 2021-07-07 RX ADMIN — TIZANIDINE 4 MG: 4 TABLET ORAL at 20:39

## 2021-07-07 RX ADMIN — POLYETHYLENE GLYCOL 3350 17 G: 17 POWDER, FOR SOLUTION ORAL at 09:25

## 2021-07-07 RX ADMIN — SERTRALINE 50 MG: 50 TABLET, FILM COATED ORAL at 08:44

## 2021-07-07 RX ADMIN — BISACODYL 10 MG: 10 SUPPOSITORY RECTAL at 13:43

## 2021-07-07 RX ADMIN — CEFTRIAXONE SODIUM 1 G: 1 INJECTION, POWDER, FOR SOLUTION INTRAMUSCULAR; INTRAVENOUS at 04:20

## 2021-07-07 NOTE — DISCHARGE INSTRUCTIONS
Discharge Instructions       PATIENT ID: Tato Colbert  MRN: [id number]   YOB: 1955    DATE OF ADMISSION: 7/6/2021 12:41 AM    DATE OF DISCHARGE: 7/7/2021    PRIMARY CARE PROVIDER: Alejandra Zarco MD     ATTENDING PHYSICIAN: Shady Sawyer MD  DISCHARGING PROVIDER: Damion James MD    To contact this individual call [phone] and ask the  to page. If unavailable ask to be transferred the Adult Hospitalist Department. DISCHARGE DIAGNOSES   Acute metabolic encephalopathy POA  Possible UTI POA  MS  Neurogenic bladder s/p suprapubic catheter  Depression     CONSULTATIONS: IP CONSULT TO NEUROLOGY    PROCEDURES/SURGERIES: * No surgery found *  Suprapubic catheter exchange 7/7/21 with 16Fr 10cc catheter    PENDING TEST RESULTS:   At the time of discharge the following test results are still pending: urine cx     FOLLOW UP APPOINTMENTS:   Follow-up Information     Follow up With Specialties Details Why Contact Jaimie Zarco, [address], Sports Medicine In 1 day  [address] [facility] [address] [phone]      Your Urologist  Call today      [address] [facility] Emergency Medicine  As needed, If symptoms worsen [facility]  [phone]       [facility] Post Hospital/ED Visit Follow-Up Instructions/Information    You may have an in home follow up visit set up with [facility] or may wish to contact [facility] to set-up a visit:    What are we? [facility] is an in-home urgent care service staffed with emergency trained medical teams. We come to your home in a vehicle stocked with medical supplies and technology. An ER physician is always available if needed. When? As a part of your hospital follow-up, an appointment has been/ or can be set up for us to come see you. Usually, this will be 24-72 hours after you leave the hospital or as needed.  [facility] is open 7am-9pm, 7 days a week, 365 days a year, including holidays. Why? We know that you cannot always get to your doctor after being in the hospital and that your doctor is not always available when you need them. Once your workup is complete, we'll call in your prescriptions, update your family doctor, and handle billing with your insurance so you can focus on feeling better, faster without leaving home. How much? We accept most major health insurance plans, including Medicaid, Medicare, and Medicare Advantage [address], [facility], and [employer]. We also accept: credit, debit, health savings account (HSA), health reimbursement account (HRA) and flexible spending account (FSA) payments. [facility] prices compare to conventional urgent care facilities, but we bring the care to you. How to reach us? Getting care is easy- use our mobile lyle ([employer]), website ([email or web address]) or call us [phone]. ADDITIONAL CARE RECOMMENDATIONS:      DIET: Regular Diet       ACTIVITY: Activity as tolerated    WOUND CARE: Continue suprapubic catheter care    EQUIPMENT needed: None      DISCHARGE MEDICATIONS:   See Medication Reconciliation Form    · It is important that you take the medication exactly as they are prescribed. · Keep your medication in the bottles provided by the pharmacist and keep a list of the medication names, dosages, and times to be taken in your wallet. · Do not take other medications without consulting your doctor. NOTIFY YOUR PHYSICIAN FOR ANY OF THE FOLLOWING:   Fever over 101 degrees for 24 hours. Chest pain, shortness of breath, fever, chills, nausea, vomiting, diarrhea, change in mentation, falling, weakness, bleeding. Severe pain or pain not relieved by medications. Or, any other signs or symptoms that you may have questions about.       DISPOSITION:  x  Home With:   OT  PT x HH  RN       SNF/Inpatient Rehab/LTAC    Independent/assisted living    Hospice    Other:     CDMP Checked:   Yes x     PROBLEM LIST Updated:  Yes x       Signed:   Laith Andrews MD  7/7/2021  3:06 PM

## 2021-07-07 NOTE — PROGRESS NOTES
Hospital follow-up PCP transitional care appointment has been scheduled with Dr. Mely Kuhn for Tuesday, 7/13/21 at 10:00 a.m. Pending patient discharge.   Rajendra Ely, Care Management Specialist.

## 2021-07-07 NOTE — PROGRESS NOTES
7/7/2021 -   FRANKIE:  - RUR: N/A; OBS - RRAT: 13  - Disposition is TBD: home vs LTC placement  - Patient is currently open to [facility]  - Patient will need BLS transport; [facility] ([employer]) phone [phone] transport confirmed for 21:45    - Cultures pending  - ABX continue  - Patient has been cleared by Neuro    CM notes that attending has discussed SNF/LTC with patient and family on 7/6. Per attending, patient is alert and oriented at this time and is able to make own medical decisions. Patient remains adamantly again a placement. Family is aware of the above. Likely disposition is for discharge to own home with BLS transport and continuation of [address] via [facility].  CRM: Willy Rachel, MPH, [facility]; Z: [phone]    15:35 -   CM was notified that patient has been cleared for discharge. CM submitted transport request to [facility] ([employer]) phone [phone] to pull patient from will call list.  CM contacted [facility] ([phone]) and notified of discharge for today from OBS admission and requested for [facility] of 7/8. CM placed [facility] information on AVS with [facility] info.   CRM: Willy Rachel, MPH, [address] [place] [address]; Z: [phone]

## 2021-07-07 NOTE — PROGRESS NOTES
[address] Adult  Hospitalist Group                                                                                          Hospitalist Progress Note  Fifi Mckeon MD  Answering service: [phone] from in house phone        Date of Service:  2021  NAME:  Chris Meier  :  1955  MRN:  [id number]      Admission Summary:     Chris Meier is a 77 y.o. female with pmh of MS wheelchair bound, urinary retention with suprapubic cath, who presented with confusion. Pt was in her typical usual state of health, when her cousin came to visit, and she was confused, talking about random things. She remembers telling them about performing surgery on herself. Lives by herslef, but has an aid that comes in weekdays 9-3. No fevers, chills, abdominal pain, no pain with urination. No new focal weakness, has had ongoing lower extremity weakness for months. Unable to transfer herself into the wheelchair for months. Interval history / Subjective:     She said she feels better, no lower abdominal pain, fever or nausea.       Assessment & Plan:     Acute metabolic encephalopathy POA  -resolved,   -conscious and well oriented    Possible UTI POA  -on iv ceftriaxone, received   -afebrile, no leukocytosis  -Urine cx grow gram negative rods, cx pending, but patient asymptomatic, will send her with po Keflex with probiotics on discharge    MS  -stable  -wheelchair bound  -continue supportive care    Neurogenic bladder s/p suprapubic catheter  -she doesn't remember her last suprapubic catheter exchange  -will ask urologist NP to replace it      Depression   -stable, continue zoloft        Code status: Full Code   DVT prophylaxis: Lovenox    Care Plan discussed with: Patient/Family, Nurse and   Anticipated Disposition: Home w/Family  Anticipated Discharge: 24 hours      Hospital Problems  Date Reviewed: 2021        Codes Class Noted POA    UTI (urinary tract infection) ICD-10-CM: N39.0  ICD-9-CM: 599.0  7/6/2021 Unknown                 Vital Signs:    Last 24hrs VS reviewed since prior progress note. Most recent are:  Visit Vitals  BP 95/61 (BP 1 Location: Right upper arm, BP Patient Position: At rest)   Pulse 62   Temp 97.6 °F (36.4 °C)   Resp 18   Ht 5' 3\" (1.6 m)   Wt 90.7 kg (199 lb 15.3 oz)   SpO2 96%   BMI 35.42 kg/m²         Intake/Output Summary (Last 24 hours) at 7/7/2021 0959  Last data filed at 7/6/2021 2149  Gross per 24 hour   Intake 480 ml   Output 1550 ml   Net -1070 ml        Physical Examination:     I had a face to face encounter with this patient and independently examined them on 7/7/2021 as outlined below:          Constitutional:  No acute distress, cooperative, pleasant    ENT:  Oral mucosa moist, oropharynx benign. Resp:  CTA bilaterally. No wheezing/rhonchi/rales. No accessory muscle use   CV:  Regular rhythm, normal rate, no murmurs, gallops, rubs    GI:  Soft, non distended, non tender. normoactive bowel sounds, no hepatosplenomegaly     Musculoskeletal:  No edema,     Neurologic:  Conscious and alert, AAOx3, motor UE 5/5, LE 1/5, CN II-XII reviewed     GUT: Suprapubic catheter in place    Labs:     Recent Labs     07/06/21  0111   WBC 8.7   HGB 12.1   HCT 39.6        Recent Labs     07/07/21  0323 07/06/21  0111    141   K 4.1 3.8   * 108   CO2 26 28   BUN 10 14   CREA 0.50* 0.74   * 138*   CA 8.7 8.5   MG 2.0  --    PHOS 3.6  --      Recent Labs     07/06/21  0111   ALT 18   AP 62   TBILI 0.1*   TP 7.0   ALB 3.1*   GLOB 3.9     No results for input(s): INR, PTP, APTT, INREXT in the last 72 hours. No results for input(s): FE, TIBC, PSAT, FERR in the last 72 hours. Lab Results   Component Value Date/Time    Folate 11.9 07/06/2020 11:13 AM      No results for input(s): PH, PCO2, PO2 in the last 72 hours. No results for input(s): CPK, CKNDX, TROIQ in the last 72 hours.     No lab exists for component: CPKMB  Lab Results   Component Value Date/Time    Cholesterol, total 247 (H) 10/10/2018 02:06 PM    HDL Cholesterol 53 10/10/2018 02:06 PM    LDL, calculated 153 (H) 10/10/2018 02:06 PM    Triglyceride 203 (H) 10/10/2018 02:06 PM     No results found for: [facility]  Lab Results   Component Value Date/Time    Color YELLOW/STRAW 07/06/2021 03:09 AM    Appearance TURBID (A) 07/06/2021 03:09 AM    Specific gravity 1.021 07/06/2021 03:09 AM    pH (UA) 8.0 07/06/2021 03:09 AM    Protein 30 (A) 07/06/2021 03:09 AM    Glucose Negative 07/06/2021 03:09 AM    Ketone TRACE (A) 07/06/2021 03:09 AM    Bilirubin Negative 07/06/2021 03:09 AM    Urobilinogen 0.2 07/06/2021 03:09 AM    Nitrites Negative 07/06/2021 03:09 AM    Leukocyte Esterase LARGE (A) 07/06/2021 03:09 AM    Epithelial cells FEW 07/06/2021 03:09 AM    Bacteria 4+ (A) 07/06/2021 03:09 AM    WBC  07/06/2021 03:09 AM    RBC 10-20 07/06/2021 03:09 AM         Medications Reviewed:     Current Facility-Administered Medications   Medication Dose Route Frequency    sodium chloride (NS) flush 5-40 mL  5-40 mL IntraVENous Q8H    sodium chloride (NS) flush 5-40 mL  5-40 mL IntraVENous PRN    acetaminophen (TYLENOL) tablet 650 mg  650 mg Oral Q6H PRN    Or    acetaminophen (TYLENOL) suppository 650 mg  650 mg Rectal Q6H PRN    polyethylene glycol (MIRALAX) packet 17 g  17 g Oral DAILY PRN    ondansetron (ZOFRAN ODT) tablet 4 mg  4 mg Oral Q8H PRN    Or    ondansetron (ZOFRAN) injection 4 mg  4 mg IntraVENous Q6H PRN    sertraline (ZOLOFT) tablet 50 mg  50 mg Oral DAILY    tiZANidine (ZANAFLEX) tablet 4 mg  4 mg Oral TID PRN    traZODone (DESYREL) tablet 50 mg  50 mg Oral QHS    bisacodyL (DULCOLAX) suppository 10 mg  10 mg Rectal DAILY PRN    enoxaparin (LOVENOX) injection 40 mg  40 mg SubCUTAneous Q24H    cefTRIAXone (ROCEPHIN) 1 g in 0.9% sodium chloride (MBP/ADV) 50 mL MBP  1 g IntraVENous Q24H     ______________________________________________________________________  EXPECTED LENGTH OF STAY: - - -  ACTUAL LENGTH OF STAY:          0                 Carlos Cazares MD

## 2021-07-07 NOTE — DISCHARGE SUMMARY
Discharge Summary       PATIENT ID: Tessa Gonzales  MRN: [id number]   YOB: 1955    DATE OF ADMISSION: 7/6/2021 12:41 AM    DATE OF DISCHARGE: 7/7/2021   PRIMARY CARE PROVIDER: Miguel A Matias MD     ATTENDING PHYSICIAN: Jessica Rodas MD  DISCHARGING PROVIDER: Junaid Melchor MD    To contact this individual call [phone] and ask the  to page. If unavailable ask to be transferred the Adult Hospitalist Department. CONSULTATIONS: IP CONSULT TO NEUROLOGY    PROCEDURES/SURGERIES: * No surgery found *    ADMITTING DIAGNOSES & HOSPITAL COURSE:       Sunshine Lancaster is a 77 y.o. female with pmh of MS wheelchair bound, urinary retention with suprapubic cath, who presented with confusion. Pt was in her typical usual state of health, when her cousin came to visit, and she was confused, talking about random things. She remembers telling them about performing surgery on herself. Lives by herslef, but has an aid that comes in weekdays 9-3. No fevers, chills, abdominal pain, no pain with urination. No new focal weakness, has had ongoing lower extremity weakness for months.  Unable to transfer herself into the wheelchair for months.     Acute metabolic encephalopathy POA  -resolv ed,   -conscious and well oriented  Possible UTI POA  -on iv ceftriaxone, received   -afebrile, no leukocytosis  -Urine cx grow gram negative rods, cx pending, but patient asymptomatic, will send her with Cefdinir 300 mg bid for 7 days po  with probiotics on discharge   MS  -stable  -wheelchair bound  -continue supportive care  Neurogenic bladder s/p suprapubic catheter  -she doesn't remember her last suprapubic catheter exchange  -will ask urologist NP to replace it    Depression   -stable, continue zoloft         Code status: Full Code   DVT prophylaxis: Lovenox    DISCHARGE DIAGNOSES / PLAN:       Acute metabolic encephalopathy POA  -resolv ed,   Possible UTI POA  -continjue Cefdinir 300 mg bid for 7 days po  with probiotics     MS  -stable  -wheelchair bound  -continue supportive care  Neurogenic bladder s/p suprapubic catheter  -s/p suprapubic changed on 7/7  Depression   -stable, continue zoloft       PENDING TEST RESULTS:   At the time of discharge the following test results are still pending: None    FOLLOW UP APPOINTMENTS:    Follow-up Information     Follow up With Specialties Details Why Contact Jaimie    Viktoriya Alfaro, [address], Sports Medicine In 1 day  [address] [address] [phone]      Your Urologist  Call today      [address] [facility] Emergency Medicine  As needed, If symptoms worsen [facility]  [phone]             DIET: Regular Diet    ACTIVITY: Activity as tolerated    WOUND CARE: continue suprapubic catheter care    EQUIPMENT needed:None      DISCHARGE MEDICATIONS:  Current Discharge Medication List      START taking these medications    Details   cefdinir (OMNICEF) 300 mg capsule Take 1 Capsule by mouth two (2) times a day for 7 days. Qty: 14 Capsule, Refills: 0  Start date: 7/6/2021, End date: 7/13/2021         CONTINUE these medications which have NOT CHANGED    Details   traZODone (DESYREL) 50 mg tablet Take 1 Tablet by mouth nightly. Qty: 30 Tablet, Refills: 0      tiZANidine (ZANAFLEX) 4 mg tablet Take 1 Tablet by mouth three (3) times daily as needed for Muscle Spasm(s). Qty: 60 Tablet, Refills: 0      sertraline (ZOLOFT) 50 mg tablet TAKE ONE TABLET BY MOUTH DAILY  Qty: 90 Tab, Refills: 1      varicella-zoster recombinant, PF, (Shingrix, PF,) 50 mcg/0.5 mL susr injection 0.5mL by IntraMUSCular route once now and then repeat in 2-6 months  Qty: 0.5 mL, Refills: 1    Associated Diagnoses: Need for shingles vaccine      bisacodyl (DULCOLAX) 10 mg suppository Insert 10 mg into rectum daily as needed. Qty: 1 Each, Refills: 0               NOTIFY YOUR PHYSICIAN FOR ANY OF THE FOLLOWING:   Fever over 101 degrees for 24 hours.    Chest pain, shortness of breath, fever, chills, nausea, vomiting, diarrhea, change in mentation, falling, weakness, bleeding. Severe pain or pain not relieved by medications. Or, any other signs or symptoms that you may have questions about. DISPOSITION:  x Home With:   OT x PT x HH  RN       Long term SNF/Inpatient Rehab    Independent/assisted living    Hospice    Other:       PATIENT CONDITION AT DISCHARGE:     Functional status   x Poor     Deconditioned     Independent      Cognition    x Lucid     Forgetful     Dementia      Catheters/lines (plus indication)    Leon     PICC     PEG    x Suprapubic catheter     Code status   x  Full code     DNR      PHYSICAL EXAMINATION AT DISCHARGE:  Patient Vitals for the past 24 hrs:   Temp Pulse Resp BP SpO2   07/07/21 1957 98.2 °F (36.8 °C) 84 18 (!) 142/90 94 %   07/07/21 0815 97.6 °F (36.4 °C) 62 18 95/61 96 %   07/07/21 0322 97.9 °F (36.6 °C) 71 16 113/71 98 %     General:          Alert, cooperative, no distress, appears stated age. HEENT:           Atraumatic, anicteric sclerae, pink conjunctivae                          No oral ulcers, mucosa moist, throat clear, dentition fair  Neck:               Supple, symmetrical  Lungs:             Clear to auscultation bilaterally. No Wheezing or Rhonchi. No rales. Chest wall:      No tenderness  No Accessory muscle use. Heart:              Regular  rhythm,  No  murmur   No edema  Abdomen:        Soft, non-tender. Not distended. Bowel sounds normal  Extremities:     No cyanosis. No clubbing,                            Skin turgor normal, Capillary refill normal  Skin:                Not pale. Not Jaundiced  No rashes   Psych:             Not anxious or agitated. Neurologic:      Alert, moves all extremities, answers questions appropriately and responds to commands       .   Recent Results (from the past 24 hour(s))   MAGNESIUM    Collection Time: 07/07/21  3:23 AM   Result Value Ref Range    Magnesium 2.0 1.6 - 2.4 mg/dL METABOLIC PANEL, BASIC    Collection Time: 07/07/21  3:23 AM   Result Value Ref Range    Sodium 141 136 - 145 mmol/L    Potassium 4.1 3.5 - 5.1 mmol/L    Chloride 111 (H) 97 - 108 mmol/L    CO2 26 21 - 32 mmol/L    Anion gap 4 (L) 5 - 15 mmol/L    Glucose 102 (H) 65 - 100 mg/dL    BUN 10 6 - 20 MG/DL    Creatinine 0.50 (L) 0.55 - 1.02 MG/DL    BUN/Creatinine ratio 20 12 - 20      GFR est AA >60 >60 ml/min/1.73m2    GFR est non-AA >60 >60 ml/min/1.73m2    Calcium 8.7 8.5 - 10.1 MG/DL   PHOSPHORUS    Collection Time: 07/07/21  3:23 AM   Result Value Ref Range    Phosphorus 3.6 2.6 - 4.7 MG/DL     CHRONIC MEDICAL DIAGNOSES:  Problem List as of 7/7/2021 Date Reviewed: 6/7/2021        Codes Class Noted - Resolved    UTI (urinary tract infection) ICD-10-CM: N39.0  ICD-9-CM: 599.0  7/6/2021 - Present        Multiple sclerosis ([facility] ICD-10-CM: G35  ICD-9-CM: 990  3/28/2021 - Present        Closed fracture of proximal end of left fibula ICD-10-CM: S82.832A  ICD-9-CM: 823.01  8/20/2018 - Present        Inability to ambulate due to left ankle or foot ICD-10-CM: R26.2  ICD-9-CM: 719.7  8/20/2018 - Present        Multiple sclerosis exacerbation ([facility] ICD-10-CM: G35  ICD-9-CM: 340  2/24/2017 - Present              Greater than 45 minutes were spent with the patient on counseling and coordination of care    Signed:   Fifi Mckeon MD  7/7/2021  3:08 PM

## 2021-07-07 NOTE — WOUND CARE
Consulted for suprapubic catheter exchange ordered by Dr. Slava Au. No allergies. Current catheter is a 16 Fr 10cc balloon and was exchanged with same size without difficulty. 8cc sterile water removed from balloon and same was replaced in new catheter. 9cm length measured at removal and new catheter was inserted 9cc prior to balloon inflation. Immediate urinary return to bedside bag.    Rita Milian, LENORA

## 2021-07-08 VITALS
SYSTOLIC BLOOD PRESSURE: 97 MMHG | WEIGHT: 199.96 LBS | RESPIRATION RATE: 19 BRPM | BODY MASS INDEX: 35.43 KG/M2 | TEMPERATURE: 97.8 F | OXYGEN SATURATION: 95 % | DIASTOLIC BLOOD PRESSURE: 59 MMHG | HEIGHT: 63 IN | HEART RATE: 69 BPM

## 2021-07-08 LAB
BACTERIA SPEC CULT: ABNORMAL
BACTERIA SPEC CULT: ABNORMAL
CC UR VC: ABNORMAL
SERVICE CMNT-IMP: ABNORMAL

## 2021-07-08 PROCEDURE — 74011250637 HC RX REV CODE- 250/637: Performed by: INTERNAL MEDICINE

## 2021-07-08 PROCEDURE — 96376 TX/PRO/DX INJ SAME DRUG ADON: CPT

## 2021-07-08 PROCEDURE — 74011000258 HC RX REV CODE- 258: Performed by: INTERNAL MEDICINE

## 2021-07-08 PROCEDURE — 99218 HC RM OBSERVATION: CPT

## 2021-07-08 PROCEDURE — 74011250636 HC RX REV CODE- 250/636: Performed by: INTERNAL MEDICINE

## 2021-07-08 RX ADMIN — TIZANIDINE 4 MG: 4 TABLET ORAL at 10:37

## 2021-07-08 RX ADMIN — SERTRALINE 50 MG: 50 TABLET, FILM COATED ORAL at 10:31

## 2021-07-08 RX ADMIN — CEFTRIAXONE SODIUM 1 G: 1 INJECTION, POWDER, FOR SOLUTION INTRAMUSCULAR; INTRAVENOUS at 04:18

## 2021-07-08 RX ADMIN — Medication 10 ML: at 05:50

## 2021-07-08 NOTE — PROGRESS NOTES
Problem: Falls - Risk of  Goal: *Absence of Falls  Description: Document Theodorejasmina Chappell Fall Risk and appropriate interventions in the flowsheet.   Outcome: Progressing Towards Goal  Note: Fall Risk Interventions:  Mobility Interventions: Communicate number of staff needed for ambulation/transfer         Medication Interventions: Teach patient to arise slowly    Elimination Interventions: Call light in reach

## 2021-07-08 NOTE — DISCHARGE SUMMARY
Discharge Summary       PATIENT ID: Nawaf Porter  MRN: [id number]   YOB: 1955    DATE OF ADMISSION: 7/6/2021 12:41 AM    DATE OF DISCHARGE: 7/7/2021   PRIMARY CARE PROVIDER: Dinh Jay MD     ATTENDING PHYSICIAN: Mary Zamora MD  DISCHARGING PROVIDER: Bakari Jose MD    To contact this individual call [phone] and ask the  to page. If unavailable ask to be transferred the Adult Hospitalist Department. CONSULTATIONS: IP CONSULT TO NEUROLOGY    PROCEDURES/SURGERIES: * No surgery found *    ADMITTING [address] [place] [address] COURSE:       Nawaf Porter is a 77 y.o. female with pmh of MS wheelchair bound, urinary retention with suprapubic cath, who presented with confusion. Pt was in her typical usual state of health, when her cousin came to visit, and she was confused, talking about random things. She remembers telling them about performing surgery on herself. Lives by herslef, but has an aid that comes in weekdays 9-3. No fevers, chills, abdominal pain, no pain with urination. No new focal weakness, has had ongoing lower extremity weakness for months. Unable to transfer herself into the wheelchair for months.      Acute metabolic encephalopathy POA  -resolv ed,   -conscious and well oriented  Possible UTI POA  -on iv ceftriaxone, received   -afebrile, no leukocytosis  -Urine cx grow gram negative rods, cx pending, but patient asymptomatic, will send her with Cefdinir 300 mg bid for 7 days po  with probiotics on discharge   MS  -stable  -wheelchair bound  -continue supportive care  Neurogenic bladder s/p suprapubic catheter  -she doesn't remember her last suprapubic catheter exchange  -will ask urologist NP to replace it    Depression   -stable, continue zoloft         Code status: Full Code   DVT prophylaxis: Lovenox    DISCHARGE DIAGNOSES / PLAN:       Acute metabolic encephalopathy POA  -resolv ed,   Possible UTI POA  -continjue Cefdinir 300 mg bid for 7 days po  with probiotics     MS  -stable  -wheelchair bound  -continue supportive care  Neurogenic bladder s/p suprapubic catheter  -s/p suprapubic changed on 7/7  Depression   -stable, continue zoloft       PENDING TEST RESULTS:   At the time of discharge the following test results are still pending: None    FOLLOW UP APPOINTMENTS:    Follow-up Information       Follow up With Specialties Details Why Contact Info    Michelle Vega MD Family Medicine, Sports Medicine On 7/13/2021 Hospital follow up PCP appointment Tuesday, 7/13/21 at 10:00 a.m.  [address]  [phone]      Your Urologist  Call today      [address] 1600 [facility] Emergency Medicine  As needed, If symptoms worsen [address] [facility]  [phone]    [facility] that will continue seeing you at home P: [phone]               DIET: Regular Diet    ACTIVITY: Activity as tolerated    WOUND CARE: continue suprapubic catheter care    EQUIPMENT needed:None      DISCHARGE MEDICATIONS:  Current Discharge Medication List        START taking these medications    Details   L.acid,para-B. bifidum-S.therm (RISAQUAD) 8 billion cell cap cap Take 1 Capsule by mouth daily. Qty: 10 Capsule, Refills: 0  Start date: 7/7/2021      cefdinir (OMNICEF) 300 mg capsule Take 1 Capsule by mouth two (2) times a day for 7 days. Qty: 14 Capsule, Refills: 0  Start date: 7/6/2021, End date: 7/13/2021           CONTINUE these medications which have NOT CHANGED    Details   traZODone (DESYREL) 50 mg tablet Take 1 Tablet by mouth nightly. Qty: 30 Tablet, Refills: 0      tiZANidine (ZANAFLEX) 4 mg tablet Take 1 Tablet by mouth three (3) times daily as needed for Muscle Spasm(s).   Qty: 60 Tablet, Refills: 0      sertraline (ZOLOFT) 50 mg tablet TAKE ONE TABLET BY MOUTH DAILY  Qty: 90 Tab, Refills: 1      varicella-zoster recombinant, PF, (Shingrix, PF,) 50 mcg/0.5 mL susr injection 0.5mL by IntraMUSCular route once now and then repeat in 2-6 months  Qty: 0.5 mL, Refills: 1    Associated Diagnoses: Need for shingles vaccine      bisacodyl (DULCOLAX) 10 mg suppository Insert 10 mg into rectum daily as needed. Qty: 1 Each, Refills: 0               NOTIFY YOUR PHYSICIAN FOR ANY OF THE FOLLOWING:   Fever over 101 degrees for 24 hours. Chest pain, shortness of breath, fever, chills, nausea, vomiting, diarrhea, change in mentation, falling, weakness, bleeding. Severe pain or pain not relieved by medications. Or, any other signs or symptoms that you may have questions about. DISPOSITION:  x Home With:   OT x PT x HH  RN       Long term SNF/Inpatient Rehab    Independent/assisted living    Hospice    Other:       PATIENT CONDITION AT DISCHARGE:     Functional status   x Poor     Deconditioned     Independent      Cognition    x Lucid     Forgetful     Dementia      Catheters/lines (plus indication)    Leon     PICC     PEG    x Suprapubic catheter     Code status   x  Full code     DNR      PHYSICAL EXAMINATION AT DISCHARGE:  Patient Vitals for the past 24 hrs:   Temp Pulse Resp BP SpO2   07/08/21 0305 97.5 °F (36.4 °C) 65 18 94/63 95 %   07/07/21 1957 98.2 °F (36.8 °C) 84 18 (!) 142/90 94 %   07/07/21 0815 97.6 °F (36.4 °C) 62 18 95/61 96 %     General:          Alert, cooperative, no distress, appears stated age. HEENT:           Atraumatic, anicteric sclerae, pink conjunctivae                          No oral ulcers, mucosa moist, throat clear, dentition fair  Neck:               Supple, symmetrical  Lungs:             Clear to auscultation bilaterally. No Wheezing or Rhonchi. No rales. Chest wall:      No tenderness  No Accessory muscle use. Heart:              Regular  rhythm,  No  murmur   No edema  Abdomen:        Soft, non-tender. Not distended. Bowel sounds normal  Extremities:     No cyanosis. No clubbing,                            Skin turgor normal, Capillary refill normal  Skin:                Not pale.   Not Jaundiced  No rashes   Psych:             Not anxious or agitated. Neurologic:      Alert, moves all extremities, answers questions appropriately and responds to commands       . No results found for this or any previous visit (from the past 24 hour(s)).   CHRONIC MEDICAL DIAGNOSES:  Problem List as of 7/8/2021 Date Reviewed: 6/7/2021          Codes Class Noted - Resolved    UTI (urinary tract infection) ICD-10-CM: N39.0  ICD-9-CM: 599.0  7/6/2021 - Present        Multiple sclerosis ([facility] ICD-10-CM: G35  ICD-9-CM: 336  3/28/2021 - Present        Closed fracture of proximal end of left fibula ICD-10-CM: S82.832A  ICD-9-CM: 823.01  8/20/2018 - Present        Inability to ambulate due to left ankle or foot ICD-10-CM: R26.2  ICD-9-CM: 719.7  8/20/2018 - Present        Multiple sclerosis exacerbation ([facility] ICD-10-CM: G35  ICD-9-CM: 003  2/24/2017 - Present                Greater than 45 minutes were spent with the patient on counseling and coordination of care    Signed:   Tia Rothman MD  7/8/2021  3:08 PM

## 2021-07-08 NOTE — PROGRESS NOTES
0830  Patient was supposed to leave at 2145 with [facility] on 07/07/2021. [facility] called to [facility] nurses station to report a delay in transport, rescheduled  time 0345 07/08/2021.  time not appropriate for patient, will delay until daytime.  Luis Zayas, RN

## 2021-07-11 LAB
BACTERIA SPEC CULT: NORMAL
SERVICE CMNT-IMP: NORMAL

## 2021-07-13 ENCOUNTER — VIRTUAL VISIT (OUTPATIENT)
Dept: INTERNAL MEDICINE CLINIC | Age: 66
End: 2021-07-13
Payer: MEDICARE

## 2021-07-13 DIAGNOSIS — G35 MULTIPLE SCLEROSIS (HCC): ICD-10-CM

## 2021-07-13 DIAGNOSIS — T83.511A URINARY TRACT INFECTION ASSOCIATED WITH CATHETERIZATION OF URINARY TRACT, UNSPECIFIED INDWELLING URINARY CATHETER TYPE, INITIAL ENCOUNTER (HCC): Primary | ICD-10-CM

## 2021-07-13 DIAGNOSIS — M25.512 ACUTE PAIN OF BOTH SHOULDERS: ICD-10-CM

## 2021-07-13 DIAGNOSIS — R41.3 MEMORY LOSS: ICD-10-CM

## 2021-07-13 DIAGNOSIS — N39.0 URINARY TRACT INFECTION ASSOCIATED WITH CATHETERIZATION OF URINARY TRACT, UNSPECIFIED INDWELLING URINARY CATHETER TYPE, INITIAL ENCOUNTER (HCC): Primary | ICD-10-CM

## 2021-07-13 DIAGNOSIS — M25.511 ACUTE PAIN OF BOTH SHOULDERS: ICD-10-CM

## 2021-07-13 PROCEDURE — 99214 OFFICE O/P EST MOD 30 MIN: CPT | Performed by: FAMILY MEDICINE

## 2021-07-13 PROCEDURE — 1101F PT FALLS ASSESS-DOCD LE1/YR: CPT | Performed by: FAMILY MEDICINE

## 2021-07-13 PROCEDURE — 3017F COLORECTAL CA SCREEN DOC REV: CPT | Performed by: FAMILY MEDICINE

## 2021-07-13 PROCEDURE — G8399 PT W/DXA RESULTS DOCUMENT: HCPCS | Performed by: FAMILY MEDICINE

## 2021-07-13 PROCEDURE — G9899 SCRN MAM PERF RSLTS DOC: HCPCS | Performed by: FAMILY MEDICINE

## 2021-07-13 PROCEDURE — G8510 SCR DEP NEG, NO PLAN REQD: HCPCS | Performed by: FAMILY MEDICINE

## 2021-07-13 PROCEDURE — 1090F PRES/ABSN URINE INCON ASSESS: CPT | Performed by: FAMILY MEDICINE

## 2021-07-13 PROCEDURE — G8427 DOCREV CUR MEDS BY ELIG CLIN: HCPCS | Performed by: FAMILY MEDICINE

## 2021-07-13 RX ORDER — NAPROXEN 500 MG/1
500 TABLET ORAL 2 TIMES DAILY WITH MEALS
Qty: 30 TABLET | Refills: 0 | Status: SHIPPED | OUTPATIENT
Start: 2021-07-13 | End: 2021-08-17

## 2021-07-13 NOTE — PROGRESS NOTES
Yani Raymond is a 77 y.o. female who was seen by synchronous (real-time) audio-video technology on 7/13/2021 for Bladder Infection and Memory Loss        Assessment & Plan:   Diagnoses and all orders for this visit:    1. Multiple sclerosis (HCC)  -     REFERRAL TO NEUROLOGY    2. Memory loss  -     REFERRAL TO NEUROLOGY    3. Acute pain of both shoulders  We will treat pain with naproxen and if no resolution will have patient come in for further evaluation  Other orders  -     naproxen (NAPROSYN) 500 mg tablet; Take 1 Tablet by mouth two (2) times daily (with meals). 4.  UTI -neurogenic bladder, patient will continue with follow-up with urology and finish cefdinir. Patient will return to clinic for urinalysis which will be arranged at the same time as her neurology appointment for possible  Patient will have memory filed by her neurologist,        Subjective:     Patient is a 61-year-old female who is seen virtually for follow-up for UTI. Patient was hospitalized with UTI from July 6 to July 7, 2021. She was discharged on Omnicef for 7 days. Patient states that her symptoms are much better and she does not have any pain with urination. She does state that she had pain bilaterally in the anterior shoulders she is worried that this may be the start of her MS flare. Patient is bedbound at this time and is awaiting a Cathy lift from her insurance company. She does have a care aide but she is unable to lift her out of bed. Patient has a catheter in and this is being managed from urology,  Recommended she follow-up for catheter exchange. Patient also complains of memory loss over the last several months. Patient states that she has no problems remembering names of people but cannot remember conversations she recently had. Example would be that she cannot remember what she ate last night but she can remember what she ate for breakfast today. Denies any head trauma but states that symptoms are worsening. She states that her depression is stable and she has not had any worsening depression symptoms. Denies any suicidal homicidal ideations. Patient also denies any fever chills or sweats  Prior to Admission medications    Medication Sig Start Date End Date Taking? Authorizing Provider   naproxen (NAPROSYN) 500 mg tablet Take 1 Tablet by mouth two (2) times daily (with meals). 7/13/21  Yes Edenilson Cornelius MD   L.acid,para-B. bifidum-S.therm (RISAQUAD) 8 billion cell cap cap Take 1 Capsule by mouth daily. 7/7/21   Leo Winter MD   cefdinir (OMNICEF) 300 mg capsule Take 1 Capsule by mouth two (2) times a day for 7 days. 7/6/21 7/13/21  Pancho Nolan MD   traZODone (DESYREL) 50 mg tablet Take 1 Tablet by mouth nightly. 6/22/21   Edenilson Cornelius MD   tiZANidine (ZANAFLEX) 4 mg tablet Take 1 Tablet by mouth three (3) times daily as needed for Muscle Spasm(s). 6/21/21   Edenilson Cornelius MD   sertraline (ZOLOFT) 50 mg tablet TAKE ONE TABLET BY MOUTH DAILY 6/18/20   Edenilson Cornelius MD   varicella-zoster recombinant, PF, (Shingrix, PF,) 50 mcg/0.5 mL susr injection 0.5mL by IntraMUSCular route once now and then repeat in 2-6 months 5/15/20   Edenilson Cornelius MD   bisacodyl (DULCOLAX) 10 mg suppository Insert 10 mg into rectum daily as needed. 3/1/17   Ada Lerma MD     Patient Active Problem List    Diagnosis Date Noted    UTI (urinary tract infection) 07/06/2021    Multiple sclerosis ([facility] 03/28/2021    Closed fracture of proximal end of left fibula 08/20/2018    Inability to ambulate due to left ankle or foot 08/20/2018    Multiple sclerosis exacerbation ([facility] 02/24/2017     Current Outpatient Medications   Medication Sig Dispense Refill    naproxen (NAPROSYN) 500 mg tablet Take 1 Tablet by mouth two (2) times daily (with meals). 30 Tablet 0    L.acid,para-B. bifidum-S.therm (RISAQUAD) 8 billion cell cap cap Take 1 Capsule by mouth daily.  10 Capsule 0    cefdinir (OMNICEF) 300 mg capsule Take 1 Capsule by mouth two (2) times a day for 7 days. 14 Capsule 0    traZODone (DESYREL) 50 mg tablet Take 1 Tablet by mouth nightly. 30 Tablet 0    tiZANidine (ZANAFLEX) 4 mg tablet Take 1 Tablet by mouth three (3) times daily as needed for Muscle Spasm(s). 60 Tablet 0    sertraline (ZOLOFT) 50 mg tablet TAKE ONE TABLET BY MOUTH DAILY 90 Tab 1    varicella-zoster recombinant, PF, (Shingrix, PF,) 50 mcg/0.5 mL susr injection 0.5mL by IntraMUSCular route once now and then repeat in 2-6 months 0.5 mL 1    bisacodyl (DULCOLAX) 10 mg suppository Insert 10 mg into rectum daily as needed. 1 Each 0     No Known Allergies  Past Medical History:   Diagnosis Date    Depression     Menopause     Multiple sclerosis ([facility]     Neurological disorder     Multiple sclerosis, in remission 2011    Other ill-defined conditions(799.89)     Mitral Value Prolapse     Past Surgical History:   Procedure Laterality Date    HX HEENT      HX HYSTERECTOMY      IR ASP BLADDER SUPRA CATH  6/30/2020    TX ABDOMEN SURGERY PROC UNLISTED      hernia repair     Family History   Problem Relation Age of Onset    Breast Cancer Mother         50's       ROS    Objective:   No flowsheet data found.      [INSTRUCTIONS:  \"[x]\" Indicates a positive item  \"[]\" Indicates a negative item  -- DELETE ALL ITEMS NOT EXAMINED]    Constitutional: [x] Appears well-developed and well-nourished [x] No apparent distress      [] Abnormal -     Mental status: [x] Alert and awake  [x] Oriented to person/place/time [x] Able to follow commands    [] Abnormal -     Eyes:   EOM    [x]  Normal    [] Abnormal -   Sclera  [x]  Normal    [] Abnormal -          Discharge [x]  None visible   [] Abnormal -     HENT: [x] Normocephalic, atraumatic  [] Abnormal -   [x] Mouth/Throat: Mucous membranes are moist    External Ears [x] Normal  [] Abnormal -    Neck: [x] No visualized mass [] Abnormal -     Pulmonary/Chest: [x] Respiratory effort normal   [x] No visualized signs of difficulty breathing or respiratory distress        [] Abnormal -      Musculoskeletal:   [x] Normal gait with no signs of ataxia         [x] Normal range of motion of neck        [] Abnormal -     Neurological:        [x] No Facial Asymmetry (Cranial nerve 7 motor function) (limited exam due to video visit)          [x] No gaze palsy        [] Abnormal -          Skin:        [x] No significant exanthematous lesions or discoloration noted on facial skin         [] Abnormal -            Psychiatric:       [x] Normal Affect [] Abnormal -        [x] No Hallucinations    Other pertinent observable physical exam findings:-        We discussed the expected course, resolution and complications of the diagnosis(es) in detail. Medication risks, benefits, costs, interactions, and alternatives were discussed as indicated. I advised her to contact the office if her condition worsens, changes or fails to improve as anticipated. She expressed understanding with the diagnosis(es) and plan. Logan Donohue, was evaluated through a synchronous (real-time) audio-video encounter. The patient (or guardian if applicable) is aware that this is a billable service. Verbal consent to proceed has been obtained within the past 12 months. The visit was conducted pursuant to the emergency declaration under the [facility], [address] [place] [address] [facility] authority and the [employer] General Act. Patient identification was verified, and a caregiver was present when appropriate. The patient was located in a state where the provider was credentialed to provide care.       Nirmala Gonzalez MD

## 2021-07-14 RX ORDER — TRAZODONE HYDROCHLORIDE 50 MG/1
TABLET ORAL
Qty: 30 TABLET | Refills: 0 | Status: SHIPPED | OUTPATIENT
Start: 2021-07-14 | End: 2021-08-16 | Stop reason: SDUPTHER

## 2021-07-28 ENCOUNTER — TELEPHONE (OUTPATIENT)
Dept: NEUROLOGY | Age: 66
End: 2021-07-28

## 2021-07-28 NOTE — TELEPHONE ENCOUNTER
Patient unable to come to appt because transportation didn't provider her with a ride that could accommodate a stretcher. She is requesting it to be r/s. Please advise on where to schedule patient.

## 2021-07-28 NOTE — TELEPHONE ENCOUNTER
Per Dr. Tanner Memory ok to offer Thursdays September 2nd at 11:00am, or September 16 at 11:30. Patient needs to arrive with a caregiver, and on time.

## 2021-07-30 ENCOUNTER — TELEPHONE (OUTPATIENT)
Dept: NEUROLOGY | Age: 66
End: 2021-07-30

## 2021-07-30 DIAGNOSIS — R41.89 COGNITIVE CHANGES: ICD-10-CM

## 2021-07-30 DIAGNOSIS — G35 MULTIPLE SCLEROSIS (HCC): Primary | ICD-10-CM

## 2021-07-30 NOTE — TELEPHONE ENCOUNTER
Patients POA is calling stating they missed their appointment on 7/28 due to transportation issues and would like to know if they can be seen sooner than scheduled appointment. Please call bacK. Patients POA needs 3 days notice before appt to schedule transportation.

## 2021-08-03 RX ORDER — SERTRALINE HYDROCHLORIDE 50 MG/1
TABLET, FILM COATED ORAL
Qty: 90 TABLET | Refills: 1 | Status: SHIPPED | OUTPATIENT
Start: 2021-08-03

## 2021-08-03 NOTE — TELEPHONE ENCOUNTER
Agree, she needs neuropsychological testing possibly to help with placement. I am concerned about long-term cognitive impairment.   I will place a referral to Dr. Bridger Stone. <--- Click to Launch ICDx for PreOp, PostOp and Procedure

## 2021-08-03 NOTE — TELEPHONE ENCOUNTER
Patient's caregiver, Boubacar Ashley, called back and verified, advise that the patient will be called if there is a sooner appointment available. Patient's caregiver states, \" I don't if Dr. Maryse Madison can order she be evaluated by Neuropsychology, she really is going downhill, Dr. Surya Cobos said she may have something called MS brain, but as of right now she is still deemed ok to make decisions for herself which puts me in a difficult position. Would Dr. Maryse Madison be willing to order a neuropsychology referral for her. Maybe we can try to get started on that while waiting for her appointment to be evaluated. \"

## 2021-08-06 NOTE — TELEPHONE ENCOUNTER
----- Message from Dino Ray sent at 8/6/2021 11:20 AM EDT -----  Regarding: /telephone  General Message/Vendor Calls    Caller's first and last name: Shelby Charlton      Reason for call: the pt's cousin asked for a call back from [place], [place] required yes/no and why:Yes      Best contact number(s):[phone]

## 2021-08-12 NOTE — TELEPHONE ENCOUNTER
Shelby Charlton [facility] called back, verified and provided with the phone number to contact Dr. Angela Corral office to have the patient scheduled.

## 2021-08-17 RX ORDER — TRAZODONE HYDROCHLORIDE 50 MG/1
TABLET ORAL
Qty: 30 TABLET | Refills: 0 | Status: SHIPPED | OUTPATIENT
Start: 2021-08-17

## 2021-08-17 RX ORDER — TIZANIDINE 4 MG/1
TABLET ORAL
Qty: 60 TABLET | Refills: 0 | Status: SHIPPED | OUTPATIENT
Start: 2021-08-17

## 2021-08-17 RX ORDER — NAPROXEN 500 MG/1
TABLET ORAL
Qty: 30 TABLET | Refills: 0 | Status: SHIPPED | OUTPATIENT
Start: 2021-08-17 | End: 2021-09-13

## 2021-09-02 ENCOUNTER — HOSPITAL ENCOUNTER (EMERGENCY)
Age: 66
Discharge: HOME | End: 2021-09-03
Attending: EMERGENCY MEDICINE
Payer: MEDICARE

## 2021-09-02 DIAGNOSIS — R21 RASH: ICD-10-CM

## 2021-09-02 DIAGNOSIS — T83.010A SUPRAPUBIC CATHETER DYSFUNCTION, INITIAL ENCOUNTER (HCC): Primary | ICD-10-CM

## 2021-09-02 LAB
ANION GAP SERPL CALC-SCNC: 3 MMOL/L (ref 5–15)
BASOPHILS # BLD: 0 K/UL (ref 0–0.1)
BASOPHILS NFR BLD: 0 % (ref 0–1)
BUN SERPL-MCNC: 13 MG/DL (ref 6–20)
BUN/CREAT SERPL: 22 (ref 12–20)
CALCIUM SERPL-MCNC: 9.1 MG/DL (ref 8.5–10.1)
CHLORIDE SERPL-SCNC: 106 MMOL/L (ref 97–108)
CO2 SERPL-SCNC: 30 MMOL/L (ref 21–32)
CREAT SERPL-MCNC: 0.59 MG/DL (ref 0.55–1.02)
DIFFERENTIAL METHOD BLD: ABNORMAL
EOSINOPHIL # BLD: 0.2 K/UL (ref 0–0.4)
EOSINOPHIL NFR BLD: 2 % (ref 0–7)
ERYTHROCYTE [DISTWIDTH] IN BLOOD BY AUTOMATED COUNT: 14.9 % (ref 11.5–14.5)
GLUCOSE SERPL-MCNC: 130 MG/DL (ref 65–100)
HCT VFR BLD AUTO: 44.5 % (ref 35–47)
HGB BLD-MCNC: 13.5 G/DL (ref 11.5–16)
IMM GRANULOCYTES # BLD AUTO: 0.1 K/UL (ref 0–0.04)
IMM GRANULOCYTES NFR BLD AUTO: 1 % (ref 0–0.5)
LYMPHOCYTES # BLD: 2.1 K/UL (ref 0.8–3.5)
LYMPHOCYTES NFR BLD: 22 % (ref 12–49)
MCH RBC QN AUTO: 25.9 PG (ref 26–34)
MCHC RBC AUTO-ENTMCNC: 30.3 G/DL (ref 30–36.5)
MCV RBC AUTO: 85.4 FL (ref 80–99)
MONOCYTES # BLD: 0.6 K/UL (ref 0–1)
MONOCYTES NFR BLD: 6 % (ref 5–13)
NEUTS SEG # BLD: 6.6 K/UL (ref 1.8–8)
NEUTS SEG NFR BLD: 69 % (ref 32–75)
NRBC # BLD: 0 K/UL (ref 0–0.01)
NRBC BLD-RTO: 0 PER 100 WBC
PLATELET # BLD AUTO: 348 K/UL (ref 150–400)
PMV BLD AUTO: 10.3 FL (ref 8.9–12.9)
POTASSIUM SERPL-SCNC: 3.6 MMOL/L (ref 3.5–5.1)
RBC # BLD AUTO: 5.21 M/UL (ref 3.8–5.2)
SODIUM SERPL-SCNC: 139 MMOL/L (ref 136–145)
WBC # BLD AUTO: 9.5 K/UL (ref 3.6–11)

## 2021-09-02 PROCEDURE — 36415 COLL VENOUS BLD VENIPUNCTURE: CPT

## 2021-09-02 PROCEDURE — 99284 EMERGENCY DEPT VISIT MOD MDM: CPT

## 2021-09-02 PROCEDURE — 80048 BASIC METABOLIC PNL TOTAL CA: CPT

## 2021-09-02 PROCEDURE — 85025 COMPLETE CBC W/AUTO DIFF WBC: CPT

## 2021-09-02 NOTE — ED PROVIDER NOTES
HPI     Please note that this dictation was completed with Hubkick, the computer voice recognition software. Quite often unanticipated grammatical, syntax, homophones, and other interpretive errors are inadvertently transcribed by the computer software. Please disregard these errors. Please excuse any errors that have escaped final proofreading. 58-year-old female with a history of multiple sclerosis, UTI admission in July with E. coli pansensitive except Macrobid, suprapubic catheter transferred for sediment in Leon catheter. Patient seen at [facility] emergency department 1 week ago. She was seen there for sediment in her suprapubic catheter. The suprapubic catheter was changed at that time. It is normally changed once a month. Today, she began experiencing spasms in the suprapubic area and can feel when the urine is leaking. The sediment has increased over the last week and the tubing of the suprapubic catheter. Denies any fevers, vomiting, cough, chest pain, shortness of breath or other complaints. She called Dr. Truong Seen office who is her urologist.  He recommended evaluation in the emergency department. Social history: Former smoker. Occasional alcohol.     Past Medical History:   Diagnosis Date    Depression     Menopause     [facility] sclerosis [address] [address]     Neurological disorder     Multiple sclerosis, in remission 2011    Other ill-defined conditions(799.89)     Mitral Value Prolapse       Past Surgical History:   Procedure Laterality Date    HX HEENT      HX HYSTERECTOMY      IR ASP BLADDER SUPRA CATH  6/30/2020    DC ABDOMEN SURGERY PROC UNLISTED      hernia repair         Family History:   Problem Relation Age of Onset    Breast Cancer Mother         52's       Social History     Socioeconomic History    Marital status: SINGLE     Spouse name: Not on file    Number of children: Not on file    Years of education: Not on file    Highest education level: Not on file   Occupational History    Not on file   Tobacco Use    Smoking status: Former Smoker     Quit date:      Years since quittin.6    Smokeless tobacco: Never Used   Vaping Use    Vaping Use: Never used   Substance and Sexual Activity    Alcohol use: Yes     Alcohol/week: 3.0 standard drinks     Types: 3 Shots of liquor per week     Comment: 3 x week    Drug use: Yes     Frequency: 1.0 times per week     Types: Marijuana     Comment: occasional marijuana    Sexual activity: Not Currently   Other Topics Concern     Service Not Asked    Blood Transfusions Not Asked    Caffeine Concern Not Asked    Occupational Exposure Not Asked    Hobby Hazards Not Asked    Sleep Concern Not Asked    Stress Concern Not Asked    Weight Concern Not Asked    Special Diet Not Asked    Back Care Not Asked    Exercise Not Asked    Bike Helmet Not Asked    Seat Belt Not Asked    Self-Exams Not Asked   Social History Narrative    Not on file     Social Determinants of Health     Financial Resource Strain:     Difficulty of Paying Living Expenses:    Food Insecurity:     Worried About Running Out of Food in the Last Year:     Ran Out of Food in the Last Year:    Transportation Needs:     Lack of Transportation (Medical):  Lack of Transportation (Non-Medical):    Physical Activity:     Days of Exercise per Week:     Minutes of Exercise per Session:    Stress:     Feeling of Stress :    Social Connections:     Frequency of Communication with Friends and Family:     Frequency of Social Gatherings with Friends and Family:     Attends Nondenominational Services:     Active Member of Clubs or Organizations:     Attends Club or Organization Meetings:     Marital Status:    Intimate Partner Violence:     Fear of Current or Ex-Partner:     Emotionally Abused:     Physically Abused:     Sexually Abused: ALLERGIES: Patient has no known allergies.     Review of Systems   Constitutional: Negative for chills and fever. Gastrointestinal: Negative for diarrhea, nausea and vomiting. Genitourinary: Positive for pelvic pain. All other systems reviewed and are negative. Vitals:    09/02/21 1346   BP: 133/79   Pulse: 60   Resp: 18   Temp: 97.6 °F (36.4 °C)   SpO2: 95%            Physical Exam  Vitals and nursing note reviewed. Constitutional:       Appearance: Normal appearance. She is well-developed. She is not ill-appearing. HENT:      Head: Normocephalic and atraumatic. Nose: No congestion or rhinorrhea. Mouth/Throat:      Mouth: Mucous membranes are moist.      Pharynx: No oropharyngeal exudate. Eyes:      General: No scleral icterus. Right eye: No discharge. Left eye: No discharge. Conjunctiva/sclera: Conjunctivae normal.   Cardiovascular:      Rate and Rhythm: Normal rate and regular rhythm. Heart sounds: Normal heart sounds. No murmur heard. No friction rub. No gallop. Pulmonary:      Effort: Pulmonary effort is normal. No respiratory distress. Breath sounds: Normal breath sounds. No wheezing or rales. Abdominal:      General: There is no distension. Palpations: Abdomen is soft. Tenderness: There is no abdominal tenderness. There is no guarding. Comments: Suprapubic catheter present with sediment in the catheter   Musculoskeletal:         General: No tenderness. Normal range of motion. Cervical back: Normal range of motion and neck supple. Lymphadenopathy:      Cervical: No cervical adenopathy. Skin:     General: Skin is warm and dry. Coloration: Skin is not pale. Findings: Rash (some darkened pigment to both arms. few areas of hyerpigmented papules excoriated on abdomen and one on face.) present. Neurological:      Mental Status: She is alert and oriented to person, place, and time. Comments: Moves both arms equally. Does not move legs.             [facility]  ED Course as of Sep 02 1822   Thu Sep 02, 2021   1739 D/w urology nurse taking info for Dr. Nikky Barboza. She will d/w Dr. Nikky Barboza. [RG]      ED Course User Index  [RG] Halle Real MD     42-year-old female with suprapubic catheter with spasms in the suprapubic area and increased sediment in the catheter tubing. Differential diagnosis includes ELIUD, urinary retention, UTI and others. Will check CBC, BMP, UA and bladder scan. Procedures        4:47 PM  Pt gave me verbal permission to speak with Valeriano Mcgovern, her medical poa. Sveta Franks MD    D/w Valeriano Mcgovern, medical POA for patient [phone]. She states that Dr. Aisha Land today her urologist change the suprapubic catheter 1 week ago. She has a follow-up appointment on the 13th. Since the catheter was changed, patient has been leaking urine out of her urethra rather than the catheter. It is increased in intensity. Patient is also had a skin rash for 1 month. She has not seen anyone for the skin rash. I advised her that I would speak with urology since it was recently changed by them to get their opinion on further management. 7:23 PM  D/w Dr. Nikky Barboza, urology. Reviewed hx, results and bladder scan. Recommends not changing catheter. Patients with suprapubic catheter can leak from the urethra. Obtain urine culture to make sure yeast is not present. Ok to not start antibiotics as no systemic sx's, fever, normal wbc. Keep the bag below the patient at all times to act like a suction. 7:38 PM  D/w Valeriano Mcgovern. She states patient is soaked in urine from coming out of urethra. I explained that leaking from the urethra despite the catheter can happen. I explained that we would flush the catheter. I do not know the cause of her rash and she should f/u with her pcp or dermatologist.  Rash has been present for 1 month. Updated patient on the plan. Pt given food earlier and tolerating po Sveta Franks MD     8:01 PM  Change of shift.   Care of patient signed over to  [employer]. Bedside handoff complete. Awaiting flushing catheter and obtaining urine culture. Recent Results (from the past 24 hour(s))   CBC WITH AUTOMATED DIFF    Collection Time: 09/02/21  3:13 PM   Result Value Ref Range    WBC 9.5 3.6 - 11.0 K/uL    RBC 5.21 (H) 3.80 - 5.20 M/uL    HGB 13.5 11.5 - 16.0 g/dL    HCT 44.5 35.0 - 47.0 %    MCV 85.4 80.0 - 99.0 FL    MCH 25.9 (L) 26.0 - 34.0 PG    MCHC 30.3 30.0 - 36.5 g/dL    RDW 14.9 (H) 11.5 - 14.5 %    PLATELET 069 529 - 335 K/uL    MPV 10.3 8.9 - 12.9 FL    NRBC 0.0 0  WBC    ABSOLUTE NRBC 0.00 0.00 - 0.01 K/uL    NEUTROPHILS 69 32 - 75 %    LYMPHOCYTES 22 12 - 49 %    MONOCYTES 6 5 - 13 %    EOSINOPHILS 2 0 - 7 %    BASOPHILS 0 0 - 1 %    IMMATURE GRANULOCYTES 1 (H) 0.0 - 0.5 %    ABS. NEUTROPHILS 6.6 1.8 - 8.0 K/UL    ABS. LYMPHOCYTES 2.1 0.8 - 3.5 K/UL    ABS. MONOCYTES 0.6 0.0 - 1.0 K/UL    ABS. EOSINOPHILS 0.2 0.0 - 0.4 K/UL    ABS. BASOPHILS 0.0 0.0 - 0.1 K/UL    ABS. IMM. GRANS. 0.1 (H) 0.00 - 0.04 K/UL    DF AUTOMATED     METABOLIC PANEL, BASIC    Collection Time: 09/02/21  3:13 PM   Result Value Ref Range    Sodium 139 136 - 145 mmol/L    Potassium 3.6 3.5 - 5.1 mmol/L    Chloride 106 97 - 108 mmol/L    CO2 30 21 - 32 mmol/L    Anion gap 3 (L) 5 - 15 mmol/L    Glucose 130 (H) 65 - 100 mg/dL    BUN 13 6 - 20 MG/DL    Creatinine 0.59 0.55 - 1.02 MG/DL    BUN/Creatinine ratio 22 (H) 12 - 20      GFR est AA >60 >60 ml/min/1.73m2    GFR est non-AA >60 >60 ml/min/1.73m2    Calcium 9.1 8.5 - 10.1 MG/DL       No results found.

## 2021-09-02 NOTE — ED NOTES
Pt arrived via EMS  From home with west in place that was changed one week ago. Pt sent here for bladder infection. Pt A/O x4.

## 2021-09-02 NOTE — DISCHARGE INSTRUCTIONS
A urine culture is pending    Keep the bag below the patient at all times to help with drainage of the urine. Call the urologist and see if you can get an appointment sooner than the 13th.

## 2021-09-03 VITALS
RESPIRATION RATE: 18 BRPM | SYSTOLIC BLOOD PRESSURE: 121 MMHG | HEART RATE: 67 BPM | OXYGEN SATURATION: 95 % | DIASTOLIC BLOOD PRESSURE: 95 MMHG | TEMPERATURE: 98.3 F

## 2021-09-03 PROCEDURE — 87086 URINE CULTURE/COLONY COUNT: CPT

## 2021-09-03 PROCEDURE — 87186 SC STD MICRODIL/AGAR DIL: CPT

## 2021-09-03 PROCEDURE — 87077 CULTURE AEROBIC IDENTIFY: CPT

## 2021-09-03 NOTE — ED NOTES
Bedside shift change report given to Elena (oncoming nurse) by Evan Chilel (offgoing nurse). Report included the following information SBAR, Kardex, ED Summary and Recent Results.

## 2021-09-03 NOTE — ED NOTES
Care assumed from Dr. Margo Monae, 68-year-old female with suprapubic catheter, not draining, discussed with urology who recommends flushing and sending urine culture. Discharge likely. 10:24 PM  Suprapubic catheter unable to flush, likely due to sediment. Patient tolerated well. Changed at bedside using sterile technique. Will discharge home with urology follow-up, return precautions given.

## 2021-09-05 RX ORDER — CEPHALEXIN 500 MG/1
500 CAPSULE ORAL 3 TIMES DAILY
Qty: 21 CAPSULE | Refills: 0 | Status: SHIPPED | OUTPATIENT
Start: 2021-09-05 | End: 2021-09-12

## 2021-09-05 NOTE — PROGRESS NOTES
I spoke with patient concerning result.   [facility] for keflex 500 tid x 7 to [facility]

## 2021-09-13 RX ORDER — NAPROXEN 500 MG/1
TABLET ORAL
Qty: 30 TABLET | Refills: 0 | Status: SHIPPED | OUTPATIENT
Start: 2021-09-13

## 2021-09-14 ENCOUNTER — HOME HEALTH ADMISSION (OUTPATIENT)
Dept: HOME HEALTH SERVICES | Facility: HOME HEALTH | Age: 66
End: 2021-09-14
Payer: MEDICARE

## 2021-09-14 ENCOUNTER — OFFICE VISIT (OUTPATIENT)
Dept: INTERNAL MEDICINE CLINIC | Age: 66
End: 2021-09-14
Payer: MEDICARE

## 2021-09-14 VITALS
HEART RATE: 71 BPM | OXYGEN SATURATION: 97 % | TEMPERATURE: 98.1 F | SYSTOLIC BLOOD PRESSURE: 124 MMHG | RESPIRATION RATE: 15 BRPM | DIASTOLIC BLOOD PRESSURE: 83 MMHG

## 2021-09-14 DIAGNOSIS — B36.0 TINEA VERSICOLOR: Primary | ICD-10-CM

## 2021-09-14 DIAGNOSIS — R29.898 WEAKNESS OF BOTH LEGS: ICD-10-CM

## 2021-09-14 DIAGNOSIS — K59.00 CONSTIPATION, UNSPECIFIED CONSTIPATION TYPE: ICD-10-CM

## 2021-09-14 DIAGNOSIS — G35 MULTIPLE SCLEROSIS (HCC): ICD-10-CM

## 2021-09-14 PROCEDURE — G8417 CALC BMI ABV UP PARAM F/U: HCPCS | Performed by: FAMILY MEDICINE

## 2021-09-14 PROCEDURE — 1101F PT FALLS ASSESS-DOCD LE1/YR: CPT | Performed by: FAMILY MEDICINE

## 2021-09-14 PROCEDURE — 99214 OFFICE O/P EST MOD 30 MIN: CPT | Performed by: FAMILY MEDICINE

## 2021-09-14 PROCEDURE — G8399 PT W/DXA RESULTS DOCUMENT: HCPCS | Performed by: FAMILY MEDICINE

## 2021-09-14 PROCEDURE — 1090F PRES/ABSN URINE INCON ASSESS: CPT | Performed by: FAMILY MEDICINE

## 2021-09-14 PROCEDURE — G8432 DEP SCR NOT DOC, RNG: HCPCS | Performed by: FAMILY MEDICINE

## 2021-09-14 PROCEDURE — G8536 NO DOC ELDER MAL SCRN: HCPCS | Performed by: FAMILY MEDICINE

## 2021-09-14 PROCEDURE — 3017F COLORECTAL CA SCREEN DOC REV: CPT | Performed by: FAMILY MEDICINE

## 2021-09-14 PROCEDURE — G8428 CUR MEDS NOT DOCUMENT: HCPCS | Performed by: FAMILY MEDICINE

## 2021-09-14 PROCEDURE — G9899 SCRN MAM PERF RSLTS DOC: HCPCS | Performed by: FAMILY MEDICINE

## 2021-09-14 RX ORDER — CEPHALEXIN 500 MG/1
500 CAPSULE ORAL 4 TIMES DAILY
COMMUNITY

## 2021-09-14 RX ORDER — FACIAL-BODY WIPES
10 EACH TOPICAL DAILY
Qty: 1 EACH | Refills: 2 | Status: SHIPPED | OUTPATIENT
Start: 2021-09-14

## 2021-09-14 RX ORDER — TROSPIUM CHLORIDE 20 MG/1
20 TABLET, FILM COATED ORAL 2 TIMES DAILY
COMMUNITY
Start: 2021-09-09

## 2021-09-14 RX ORDER — TERBINAFINE HYDROCHLORIDE 250 MG/1
250 TABLET ORAL 2 TIMES DAILY
Qty: 40 TABLET | Refills: 0 | Status: SHIPPED | OUTPATIENT
Start: 2021-09-14 | End: 2021-10-04

## 2021-09-14 RX ORDER — BISACODYL 5 MG
5 TABLET, DELAYED RELEASE (ENTERIC COATED) ORAL
Qty: 30 TABLET | Refills: 0 | Status: SHIPPED | OUTPATIENT
Start: 2021-09-14

## 2021-09-14 NOTE — PROGRESS NOTES
Chief Complaint   Patient presents with    Skin Problem     Patient is here for skin problem    Constipation     Patient caregiver states patient has  constipation     she is a 74y.o. year old female who presents for evaluation of skin depigmentation and constipation. Patient states that she has been to the ER twice for constipation been getting MiraLAX in the ER which has helped. Patient states that she used to take Dulcolax but does not take that anymore. Patient states that she has also for the past month had a skin condition which causes itching and depigmentation. Patient states that it spread over her arms and has started coming on her face. Denies any associated medications or previous symptoms. This has no pain but it is itchy. Patient also would like to get a home health referral for physical therapy as she is bedbound due to [address] and has muscle weakness in the lower extremities. Reviewed and agree with Nurse Note and duplicated in this note. Reviewed PmHx, RxHx, FmHx, SocHx, AllgHx and updated and dated in the chart. Family History   Problem Relation Age of Onset    Breast Cancer Mother         52's       Past Medical History:   Diagnosis Date    Depression     Menopause     Multiple sclerosis ([facility] [address]     Neurological disorder     Multiple sclerosis, in remission     Other ill-defined conditions(799.89)     Mitral Value Prolapse      Social History     Socioeconomic History    Marital status: SINGLE     Spouse name: Not on file    Number of children: Not on file    Years of education: Not on file    Highest education level: Not on file   Tobacco Use    Smoking status: Former Smoker     Quit date:      Years since quittin.7    Smokeless tobacco: Never Used   Vaping Use    Vaping Use: Never used   Substance and Sexual Activity    Alcohol use: Yes     Alcohol/week: 3.0 standard drinks     Types: 3 Shots of liquor per week     Comment: 3 x week    Drug use:  Yes Frequency: 1.0 times per week     Types: Marijuana     Comment: occasional marijuana    Sexual activity: Not Currently   Other Topics Concern     Social Determinants of Health     Financial Resource Strain:     Difficulty of Paying Living Expenses:    Food Insecurity:     Worried About Running Out of Food in the Last Year:     [address] in the Last Year:    Transportation Needs:     Lack of Transportation (Medical):  Lack of Transportation (Non-Medical):    Physical Activity:     Days of Exercise per Week:     Minutes of Exercise per Session:    Stress:     Feeling of Stress :    Social Connections:     Frequency of Communication with Friends and Family:     Frequency of Social Gatherings with Friends and Family:     Attends Tenriism Services:     Active Member of Clubs or Organizations:     Attends Club or Organization Meetings:     Marital Status:         Review of Systems - negative except as listed above      Objective:     Vitals:    09/14/21 1129   BP: 124/83   Pulse: 71   Resp: 15   Temp: 98.1 °F (36.7 °C)   SpO2: 97%       Physical Examination: General appearance - alert, well appearing, and in no distress  Eyes - pupils equal and reactive, extraocular eye movements intact  Ears - bilateral TM's and external ear canals normal  Nose - normal and patent, no erythema, discharge or polyps  Mouth - mucous membranes moist, pharynx normal without lesions  Neck - supple, no significant adenopathy  Chest - clear to auscultation, no wheezes, rales or rhonchi, symmetric air entry  Heart - normal rate, regular rhythm, normal S1, S2, no murmurs, rubs, clicks or gallops  Abdomen - soft, nontender, nondistended, no masses or organomegaly  Extremities - peripheral pulses normal, no pedal edema, no clubbing or cyanosis  Skin - normal coloration and turgor, no rashes, no suspicious skin lesions noted     Assessment/ Plan:   Diagnoses and all orders for this visit:    1.  Tinea versicolor  - terbinafine HCL (LAMISIL) 250 mg tablet; Take 1 Tablet by mouth two (2) times a day for 20 days.  -     REFERRAL TO DERMATOLOGY    2. Weakness of both legs  -     REFERRAL TO HOME HEALTH    3. [facility] ([facility] [address]  -     [facility]    4. Constipation, unspecified constipation type  -     bisacodyL (DULCOLAX) 5 mg EC tablet; Take 1 Tablet by mouth daily as needed for Constipation.  -     bisacodyL (DULCOLAX) 10 mg supp; Insert 10 mg into rectum daily. Use prn.  -     REFERRAL TO GASTROENTEROLOGY           [facility] for physical therapy due to weakness and MS    I have discussed the diagnosis with the patient and the intended plan as seen in the above orders. The patient has received an after-visit summary and questions were answered concerning future plans. Medication Side Effects and Warnings were discussed with patient,  Patient Labs were reviewed and or requested, and  Patient Past Records were reviewed and or requested  yes       Pt agrees to call or return to clinic and/or go to closest ER with any worsening of symptoms. This may include, but not limited to increased fever (>100.4) with NSAIDS or Tylenol, increased edema, confusion, rash, worsening of presenting symptoms. Please note that this dictation was completed with [employer], the computer voice recognition software. Quite often unanticipated grammatical, syntax, homophones, and other interpretive errors are inadvertently transcribed by the computer software. Please disregard these errors. Please excuse any errors that have escaped final proofreading. Thank you.

## 2021-09-15 ENCOUNTER — HOME CARE VISIT (OUTPATIENT)
Dept: SCHEDULING | Facility: HOME HEALTH | Age: 66
End: 2021-09-15
Payer: MEDICARE

## 2021-09-15 VITALS
OXYGEN SATURATION: 95 % | HEART RATE: 104 BPM | DIASTOLIC BLOOD PRESSURE: 64 MMHG | SYSTOLIC BLOOD PRESSURE: 106 MMHG | TEMPERATURE: 96.7 F

## 2021-09-15 PROCEDURE — 400013 HH SOC

## 2021-09-15 PROCEDURE — G0151 HHCP-SERV OF PT,EA 15 MIN: HCPCS

## 2021-09-18 ENCOUNTER — HOME CARE VISIT (OUTPATIENT)
Dept: HOME HEALTH SERVICES | Facility: HOME HEALTH | Age: 66
End: 2021-09-18
Payer: MEDICARE

## 2021-09-20 ENCOUNTER — HOME CARE VISIT (OUTPATIENT)
Dept: SCHEDULING | Facility: HOME HEALTH | Age: 66
End: 2021-09-20
Payer: MEDICARE

## 2021-09-20 VITALS
DIASTOLIC BLOOD PRESSURE: 68 MMHG | OXYGEN SATURATION: 94 % | SYSTOLIC BLOOD PRESSURE: 140 MMHG | RESPIRATION RATE: 20 BRPM | TEMPERATURE: 97.2 F | HEART RATE: 93 BPM

## 2021-09-20 PROCEDURE — G0151 HHCP-SERV OF PT,EA 15 MIN: HCPCS

## 2021-09-24 ENCOUNTER — HOME CARE VISIT (OUTPATIENT)
Dept: SCHEDULING | Facility: HOME HEALTH | Age: 66
End: 2021-09-24
Payer: MEDICARE

## 2021-09-24 NOTE — CASE COMMUNICATION
Unable to reach patient by phone, called her [address] [facility] and was told that pt had continued to have constipation even after taking stool softner and went to ER on 9/22, admitted with bowel impaction but additional testing also ordered and possible colonoscopy; confirmed she is at [place] [address] [address]

## 2021-10-13 ENCOUNTER — TELEPHONE (OUTPATIENT)
Dept: NEUROLOGY | Age: 66
End: 2021-10-13

## 2021-10-13 NOTE — TELEPHONE ENCOUNTER
Patients daughter calling wanting to speak with Dr. Etta Cohen nurse before her mom comes in for her appt on 10/15.  Please call back

## 2021-10-14 NOTE — TELEPHONE ENCOUNTER
Agree, if she is having an acute decline in her mental status is poor she really needs to go to the emergency room to be evaluated fully for multiple possibilities causing this.

## 2021-10-14 NOTE — TELEPHONE ENCOUNTER
Called patient's daughter Darwin Lang back, verified, she states \" there is so much that has gone downhill with her since you all last saw her, I have arranged for [employer] to bring her, and they told me someone would be with her, but I am not sure if they will, but she was at [facility] for therapy for a few weeks and while she was there she attempted to commit suicide, and I am not sure why they did not call for a psych consult then. I was not able to schedule an appointment with Dr. Roseann Raymundo because her previous caretaker left abruptly and there was a lot of things that fell thru the cracks. She has to be taken everywhere in a stretcher, and she has had back to back infections due to her suprapubic catheter. \" I advised the patient's daughter that Dr. Gaudencio Virgen had previously stated that the patient must be accompanied by some that is able to comprehend and retain the information as the patient is not, the patient's daughter replied \" I am handicapped myself so I am not able to come with her, but because of her MS she has physically gone downhill and she has tried to commit suicide and has vocalized that she plans to continue trying so I really need Dr. Buitrago Fix help to get her seen with pschiatry as soon as possible. She has had a complete change in demeanor and it continues to get worse. \" I advised the patient's daughter, that based on what she is telling me, Dr. Gaudencio Virgen may recommend the patient be admitted to the ER, to be evaluated by psychiatry, and if that is what is recommended then I will call her back to notify her so that she may arrange transport for the patient to the ER. \"

## 2021-10-14 NOTE — TELEPHONE ENCOUNTER
Patient's daughter called back, verified, she requested cancellation of office visit scheduled for 10/15/2021, and she will try to arrange transport to [facility] ER and will also request documentation regarding patient's acute change in mental status to also be sent over from [place].

## 2021-10-15 PROCEDURE — 400013 HH SOC

## 2022-03-18 PROBLEM — G35 MULTIPLE SCLEROSIS (HCC): Status: ACTIVE | Noted: 2021-03-28

## 2022-03-18 PROBLEM — S82.832A CLOSED FRACTURE OF PROXIMAL END OF LEFT FIBULA: Status: ACTIVE | Noted: 2018-08-20

## 2022-03-18 PROBLEM — G35 MULTIPLE SCLEROSIS EXACERBATION (HCC): Status: ACTIVE | Noted: 2017-02-24

## 2022-03-19 PROBLEM — R26.2 INABILITY TO AMBULATE DUE TO LEFT ANKLE OR FOOT: Status: ACTIVE | Noted: 2018-08-20

## 2022-03-20 PROBLEM — N39.0 UTI (URINARY TRACT INFECTION): Status: ACTIVE | Noted: 2021-07-06
